# Patient Record
Sex: MALE | Race: WHITE | NOT HISPANIC OR LATINO | Employment: FULL TIME | URBAN - METROPOLITAN AREA
[De-identification: names, ages, dates, MRNs, and addresses within clinical notes are randomized per-mention and may not be internally consistent; named-entity substitution may affect disease eponyms.]

---

## 2017-01-11 ENCOUNTER — OFFICE VISIT (OUTPATIENT)
Dept: FAMILY MEDICINE | Facility: CLINIC | Age: 57
End: 2017-01-11
Payer: COMMERCIAL

## 2017-01-11 VITALS
OXYGEN SATURATION: 97 % | HEIGHT: 75 IN | HEART RATE: 93 BPM | DIASTOLIC BLOOD PRESSURE: 72 MMHG | RESPIRATION RATE: 14 BRPM | TEMPERATURE: 97.9 F | SYSTOLIC BLOOD PRESSURE: 110 MMHG | BODY MASS INDEX: 24.28 KG/M2 | WEIGHT: 195.3 LBS

## 2017-01-11 DIAGNOSIS — M25.571 ACUTE RIGHT ANKLE PAIN: Primary | ICD-10-CM

## 2017-01-11 PROCEDURE — 99213 OFFICE O/P EST LOW 20 MIN: CPT | Performed by: FAMILY MEDICINE

## 2017-01-11 NOTE — MR AVS SNAPSHOT
After Visit Summary   1/11/2017    Gabe Moon    MRN: [id number]           Patient Information     Date Of Birth          1960        Visit Information        Provider Department      1/11/2017 8:15 AM Beatriz Edmondson MD [facility]        Today's Diagnoses     Acute right ankle pain    -  1        Follow-ups after your visit        Additional Services     [facility] PT, HAND, AND CHIROPRACTIC REFERRAL       **This order will print in the [facility] Scheduling Office**    Physical Therapy, Hand Therapy and Chiropractic Care are available through:    *[facility]  *[facility]  *[facility]    Call one number to schedule at any of the above locations: [phone].    Your provider has referred you to: Physical Therapy at [facility] or [facility]    Indication/Reason for Referral: Ankle Pain  Onset of Illness: couple months  Therapy Orders: Evaluate and Treat  Special Programs: None  Special Request: None    Mony Sykes      Additional Comments for the Therapist or Chiropractor:       Please be aware that coverage of these services is subject to the terms and limitations of your health insurance plan.  Call member services at your health plan with any benefit or coverage questions.      Please bring the following to your appointment:    *Your personal calendar for scheduling future appointments  *Comfortable clothing                  Who to contact     If you have questions or need follow up information about today's clinic visit or your schedule please contact [facility] directly at [phone].  Normal or non-critical lab and imaging results will be communicated to you by MyChart, letter or phone within 4 business days after the clinic has received the results. If you do not hear from us within 7 days, please contact the clinic through MyChart or phone. If you have a critical or abnormal lab result, we will notify you by phone as  "soon as possible.  Submit refill requests through [employer] or call your pharmacy and they will forward the refill request to us. Please allow 3 business days for your refill to be completed.          Additional Information About Your Visit        Liazon[employer] Information     [employer] gives you secure access to your electronic health record. If you see a primary care provider, you can also send messages to your care team and make appointments. If you have questions, please call your primary care clinic.  If you do not have a primary care provider, please call [phone] and they will assist you.        Care EveryWhere ID     This is your Care EveryWhere ID. This could be used by other organizations to access your [place] medical records  [id number]        Your Vitals Were     Pulse Temperature Respirations Height BMI (Body Mass Index) Pulse Oximetry    93 97.9  F (36.6  C) (Oral) 14 6' 2.5\" (1.892 m) 24.75 kg/m2 97%       Blood Pressure from Last 3 Encounters:   01/11/17 110/72   09/14/16 110/70   07/12/16 118/73    Weight from Last 3 Encounters:   01/11/17 195 lb 4.8 oz (88.587 kg)   09/14/16 191 lb 1.6 oz (86.682 kg)   07/12/16 193 lb 9.6 oz (87.816 kg)              We Performed the Following     [facility] PT, HAND, AND CHIROPRACTIC REFERRAL        Primary Care Provider    None Specified       No primary provider on file.        Thank you!     Thank you for choosing [facility]  for your care. Our goal is always to provide you with excellent care. Hearing back from our patients is one way we can continue to improve our services. Please take a few minutes to complete the written survey that you may receive in the mail after your visit with us. Thank you!             Your Updated Medication List - Protect others around you: Learn how to safely use, store and throw away your medicines at www.disposemymeds.org.      Notice  As of 1/11/2017  8:39 AM    You have not been prescribed any medications.      "

## 2017-01-11 NOTE — PROGRESS NOTES
"  SUBJECTIVE:                                                    Gabe Moon is a 56 year old male who presents to clinic today for the following health issues:    Ankle flare up      Duration: Since 11/2016 worsen during the holidays.    Description (location/character/radiation): Lt Ankle     Intensity:  Moderate but moments when it becomes severe    Accompanying signs and symptoms: Inflamed,sore and swelling     History (similar episodes/previous evaluation): None    Precipitating or alleviating factors: None    Therapies tried and outcome: Aleve-with some relief      L ankle pain- early Nov, mild then, only if stretching/moving.  Kept getting worse, area started getting swollen over [place].  Aleve helped.  Skiing- hurt if jammed lateral area, fine otherwise.  Also forward flexion on elliptical worsens pain, so stopped.  Swelling on/off.    No trauma, no increased activities.  Can't think of anything that would have started it.    Problem list, Medication list, Allergies, and Medical/Social/Surgical histories reviewed in EPIC and updated as appropriate.    ROS:  Constitutional, HEENT, cardiovascular, pulmonary, gi and gu systems are negative, except as otherwise noted.    OBJECTIVE:                                                    /72 mmHg  Pulse 93  Temp(Src) 97.9  F (36.6  C) (Oral)  Resp 14  Ht 6' 2.5\" (1.892 m)  Wt 195 lb 4.8 oz (88.587 kg)  BMI 24.75 kg/m2  SpO2 97%  Body mass index is 24.75 kg/(m^2).  GENERAL APPEARANCE: healthy, alert and no distress  ORTHO: Ankle Exam:   Knee:normal on palpation, full range of motion  Lower leg:normal appearance, normal on palpation, normal gastroc-soleus muscle complex    ANKLE  Inspection:Swelling:lateral    Tender:lateral malleolus, peroneus longus tendon area  Range of Motion:dorsiflexion:  full, pain-free, plantarflexion:  full, pain-free, inversion:  full, painful, eversion:  full, pain-free  Strength:dorsiflexion:  5/5, plantarflexion:  5/5, " inversion: 5/5, painful, eversion:5/5  Special tests:negative anterior drawer, negative talar tilt, negative valgus stress    FOOT  foot exam : Inspection Palpation:   Swelling: no swelling  Tender::peroneal tendon:  at lateral malleolus  Range of Motion:flexion of toes:  full, pain-free, extension of toes  full, pain-free    SKIN: no suspicious lesions or rashes, varicous veins    Diagnostic Test Results:  none      ASSESSMENT/PLAN:                                                        ICD-10-CM    1. Acute right ankle pain M25.571 [facility] PT, HAND, AND CHIROPRACTIC REFERRAL     Lateral R ankle pain, mostly in peroneus longus tendon area and lateral malleolus, no known trauma or overuse issue, now worsening with time, and especially with activities using plantar flexion.  Rec icing, stretching, and eval/txt with PT given worsening sx's.    Beatriz Edmondson MD  [facility]

## 2017-01-16 ENCOUNTER — THERAPY VISIT (OUTPATIENT)
Dept: PHYSICAL THERAPY | Facility: CLINIC | Age: 57
End: 2017-01-16
Payer: COMMERCIAL

## 2017-01-16 DIAGNOSIS — M25.572 ANKLE PAIN, LEFT: Primary | ICD-10-CM

## 2017-01-16 PROCEDURE — 97161 PT EVAL LOW COMPLEX 20 MIN: CPT | Mod: GP | Performed by: PHYSICAL THERAPIST

## 2017-01-16 PROCEDURE — 97140 MANUAL THERAPY 1/> REGIONS: CPT | Mod: GP | Performed by: PHYSICAL THERAPIST

## 2017-01-16 PROCEDURE — 97110 THERAPEUTIC EXERCISES: CPT | Mod: GP | Performed by: PHYSICAL THERAPIST

## 2017-01-16 NOTE — PROGRESS NOTES
Subjective:    Gabe Moon is a 56 year old male with a left ankle condition.  Condition occurred with:  Insidious onset.  Condition occurred: for unknown reasons.  This is a new condition  1/11/17 referral.  Pt stated his pain began in early November - he does not recall a precipitating event.  Pain follows peroneal tendon/muscle posterior to L lateral malleolus.  Ankle pain has been present since the onset of pain, foot pain has just started yesterday.  Pt reports walking, standing, calf raises are pain free, but calf raises flare it up the next day.  He also reports R>L hip issues and hx of back pain for which he has had physical therapy.   Goes to gym daily - can't do elliptical due to dorsiflexion, skiing - cautious but able to perform, walking on uneven ground/balancing have been challenging.  .    Patient reports pain:  Lateral.  Radiates to:  Lower leg (peroneal tendons/muscles, Achilles tendon occasionally).  Pain is described as aching and sharp and is intermittent and reported as 5/10.  Associated symptoms:  Edema and loss of strength. Pain is worse in the P.M..  Symptoms are exacerbated by other and weight bearing (inversion + weight bearing, dorsiflexion, elliptical) and relieved by bracing/immobilizing and NSAID's.  Since onset symptoms are gradually worsening.        General health as reported by patient is excellent.  Pertinent medical history includes:  Other (L 4/5 DDD).  Medical allergies: no.  Other surgeries include:  Other (L LE varicose veins 2-3 years ago).  Current medications:  Anti-inflammatory.  Current occupation is .  Patient is working in normal job without restrictions.  Primary job tasks include:  Prolonged sitting and prolonged standing (computer work).    Barriers include:  None as reported by patient.    Red flags:  None as reported by patient.                      Objective:    Standing Alignment:          Pelvic:  Normal  Hip deviations alignment: B femoral  medial rotation.  Knee:  Genu recuvatum L and genu recurvatum R  Ankle/Foot:  Pes cavus R and pes cavus L (R>L calcaneal varus)  General Deviations:  Toe out L and toe out R            Physical Exam     Functional Tests:  L single leg stance: femoral medial rotation, soreness reported, goes into pronation  R single leg stance: slight supination initially, then stable  Partial squat: good femoral control, no change in sxs  Active dorsiflexion: excessed toe extension B, gets pain at L lateral ankle with repetition    Strength:  R ankle 5/5 and pain free  L ankle 5/5, pain reported at the following:  Fib brev/long - slight pain lateral L ankle  Post tib - soreness in L lateral ankle  No pain fib tertius    ROM:  Motion L R Comments   Plantarflexion 11 19 Pain free   Dorsiflexion   14 22 Pain free     Sensation:  Light touch intact except lateral L lower leg - impaired since varicose vein surgery    Palpation:  Tenderness L fibularis longus/brevis tendon/muscle belly, no tenderness L Achilles tendon    Edema:  L lateral ankle mild edema compared to R    Gait:  R>L trendelenberg, clockwise pelvic rotation, R>L femoral adduction, B toe out sliglhty, B toe ext        General     ROS    Assessment/Plan:      Patient is a 56 year old male with left side ankle complaints.    Patient has the following significant findings with corresponding treatment plan.                Diagnosis 1:  L ankle pain  Pain -  hot/cold therapy, manual therapy, education and home program  Decreased ROM/flexibility - manual therapy, therapeutic exercise, therapeutic activity and home program  Impaired balance - neuro re-education, therapeutic activities and home program  Edema - cold therapy and self management/home program  Impaired gait - gait training and home program  Impaired muscle performance - neuro re-education and home program  Decreased function - therapeutic activities and home program  Impaired posture - neuro re-education, therapeutic  activities and home program    Therapy Evaluation Codes:   1) History comprised of:   Personal factors that impact the plan of care:      Profession.    Comorbidity factors that impact the plan of care are:      None.     Medications impacting care: Anti-inflammatory.  2) Examination of Body Systems comprised of:   Body structures and functions that impact the plan of care:      Ankle and foot.   Activity limitations that impact the plan of care are:      Sports, Standing and Walking.  3) Clinical presentation characteristics are:   Stable/Uncomplicated.  4) Decision-Making    Low complexity using standardized patient assessment instrument and/or measureable assessment of functional outcome.  Cumulative Therapy Evaluation is: Low complexity.    Previous and current functional limitations:  (See Goal Flow Sheet for this information)    Short term and Long term goals: (See Goal Flow Sheet for this information)     Communication ability:  Patient appears to be able to clearly communicate and understand verbal and written communication and follow directions correctly.  Treatment Explanation - The following has been discussed with the patient:   RX ordered/plan of care  Anticipated outcomes  Possible risks and side effects  This patient would benefit from PT intervention to resume normal activities.   Rehab potential is good.    Frequency:  1 X week, once daily  Duration:  for 6 weeks  Discharge Plan:  Achieve all LTG.  Independent in home treatment program.  Reach maximal therapeutic benefit.    Please refer to the daily flowsheet for treatment today, total treatment time and time spent performing 1:1 timed codes.

## 2017-01-23 ENCOUNTER — THERAPY VISIT (OUTPATIENT)
Dept: PHYSICAL THERAPY | Facility: CLINIC | Age: 57
End: 2017-01-23
Payer: COMMERCIAL

## 2017-01-23 DIAGNOSIS — M25.572 ANKLE PAIN, LEFT: Primary | ICD-10-CM

## 2017-01-23 PROCEDURE — 97140 MANUAL THERAPY 1/> REGIONS: CPT | Mod: GP | Performed by: PHYSICAL THERAPIST

## 2017-01-23 PROCEDURE — 97112 NEUROMUSCULAR REEDUCATION: CPT | Mod: GP | Performed by: PHYSICAL THERAPIST

## 2017-01-23 PROCEDURE — 97110 THERAPEUTIC EXERCISES: CPT | Mod: GP | Performed by: PHYSICAL THERAPIST

## 2017-01-30 ENCOUNTER — THERAPY VISIT (OUTPATIENT)
Dept: PHYSICAL THERAPY | Facility: CLINIC | Age: 57
End: 2017-01-30
Payer: COMMERCIAL

## 2017-01-30 DIAGNOSIS — M25.572 ANKLE PAIN, LEFT: Primary | ICD-10-CM

## 2017-01-30 PROCEDURE — 97112 NEUROMUSCULAR REEDUCATION: CPT | Mod: GP | Performed by: PHYSICAL THERAPIST

## 2017-01-30 PROCEDURE — 97140 MANUAL THERAPY 1/> REGIONS: CPT | Mod: GP | Performed by: PHYSICAL THERAPIST

## 2017-01-30 PROCEDURE — 97110 THERAPEUTIC EXERCISES: CPT | Mod: GP | Performed by: PHYSICAL THERAPIST

## 2017-02-10 ENCOUNTER — TELEPHONE (OUTPATIENT)
Dept: FAMILY MEDICINE | Facility: CLINIC | Age: 57
End: 2017-02-10

## 2017-02-10 ENCOUNTER — THERAPY VISIT (OUTPATIENT)
Dept: PHYSICAL THERAPY | Facility: CLINIC | Age: 57
End: 2017-02-10
Payer: COMMERCIAL

## 2017-02-10 DIAGNOSIS — M25.519 ACUTE SHOULDER PAIN, UNSPECIFIED LATERALITY: Primary | ICD-10-CM

## 2017-02-10 DIAGNOSIS — M25.572 ANKLE PAIN, LEFT: Primary | ICD-10-CM

## 2017-02-10 PROCEDURE — 97110 THERAPEUTIC EXERCISES: CPT | Mod: GP | Performed by: PHYSICAL THERAPIST

## 2017-02-10 PROCEDURE — 97140 MANUAL THERAPY 1/> REGIONS: CPT | Mod: GP | Performed by: PHYSICAL THERAPIST

## 2017-02-10 PROCEDURE — 97112 NEUROMUSCULAR REEDUCATION: CPT | Mod: GP | Performed by: PHYSICAL THERAPIST

## 2017-02-10 NOTE — TELEPHONE ENCOUNTER
Reason for Call: Request for an order or referral:    Order or referral being requested: PT for shoulder right     Date needed: as soon as possible    Has the patient been seen by the PCP for this problem? YES    Additional comments: is a chronic problem and has flared up.   Is an old injury    Phone number Patient can be reached at:  Home number on file [phone] (home)    Best Time:  anytime    Can we leave a detailed message on this number?  YES    Call taken on 2/10/2017 at 4:49 PM by Elsy Ty

## 2017-02-13 NOTE — TELEPHONE ENCOUNTER
MP,  Please advise CW's absence.  Pt has PT appt scheduled for 840am today.  No referral in place yet.  Please authorize if appropriate.  Thanks,  Marly COONEY RN

## 2017-02-13 NOTE — TELEPHONE ENCOUNTER
Here Orders are done they are in her chart. Here insurance doesn't require referrals    Marychuy Rodríguez  Referral Coordinator

## 2017-02-13 NOTE — TELEPHONE ENCOUNTER
Patient informed; states he was told referral was needed in order for them to see him  Marly COONEY RN

## 2017-02-13 NOTE — TELEPHONE ENCOUNTER
Reason for call: Order   Order or referral being requested: PT for shoulder injury/issue.   Reason for request: Pt is waiting on approval for a PT orders, states he has an appt today.   Date needed: as soon as possible per pt  Has the patient been seen by the PCP for this problem? unsure    Additional comments: n/a    Phone Number Pt can be reached at: [place] number on file [phone] (home)  Best Time: As soon as possible  Can we leave a detailed message on this number? YES

## 2017-02-15 ENCOUNTER — THERAPY VISIT (OUTPATIENT)
Dept: PHYSICAL THERAPY | Facility: CLINIC | Age: 57
End: 2017-02-15
Payer: COMMERCIAL

## 2017-02-15 DIAGNOSIS — G89.29 CHRONIC RIGHT SHOULDER PAIN: Primary | ICD-10-CM

## 2017-02-15 DIAGNOSIS — M25.511 CHRONIC RIGHT SHOULDER PAIN: Primary | ICD-10-CM

## 2017-02-15 PROCEDURE — 97112 NEUROMUSCULAR REEDUCATION: CPT | Mod: GP | Performed by: PHYSICAL THERAPIST

## 2017-02-15 PROCEDURE — 97110 THERAPEUTIC EXERCISES: CPT | Mod: GP | Performed by: PHYSICAL THERAPIST

## 2017-02-15 PROCEDURE — 97161 PT EVAL LOW COMPLEX 20 MIN: CPT | Mod: GP | Performed by: PHYSICAL THERAPIST

## 2017-02-15 NOTE — MR AVS SNAPSHOT
After Visit Summary   2/15/2017    Gabe Moon    MRN: [id number]           Patient Information     Date Of Birth          1960        Visit Information        Provider Department      2/15/2017 5:10 PM Barbara Pt, VESTA [place] [facility]        Today's Diagnoses     Chronic right shoulder pain    -  1       Follow-ups after your visit        Who to contact     If you have questions or need follow up information about today's clinic visit or your schedule please contact [facility] PHYSICAL THERAPY directly at [phone].  Normal or non-critical lab and imaging results will be communicated to you by [employer], letter or phone within 4 business days after the clinic has received the results. If you do not hear from us within 7 days, please contact the clinic through [employer] or phone. If you have a critical or abnormal lab result, we will notify you by phone as soon as possible.  Submit refill requests through [employer] or call your pharmacy and they will forward the refill request to us. Please allow 3 business days for your refill to be completed.          Additional Information About Your Visit        MyChart Information     [employer] gives you secure access to your electronic health record. If you see a primary care provider, you can also send messages to your care team and make appointments. If you have questions, please call your primary care clinic.  If you do not have a primary care provider, please call [phone] and they will assist you.        Care EveryWhere ID     This is your Care EveryWhere ID. This could be used by other organizations to access your [place] medical records  [id number]         Blood Pressure from Last 3 Encounters:   01/11/17 110/72   09/14/16 110/70   07/12/16 118/73    Weight from Last 3 Encounters:   01/11/17 88.6 kg (195 lb 4.8 oz)   09/14/16 86.7 kg (191 lb 1.6 oz)   07/12/16 87.8 kg  (193 lb 9.6 oz)              We Performed the Following     HC PT EVAL, LOW COMPLEXITY     [facility] INITIAL EVAL REPORT     NEUROMUSCULAR RE-EDUCATION     THERAPEUTIC EXERCISES        Primary Care Provider    None Specified       No primary provider on file.        Thank you!     Thank you for choosing [place] [facility] PHYSICAL THERAP[facility]  for your care. Our goal is always to provide you with excellent care. Hearing back from our patients is one way we can continue to improve our services. Please take a few minutes to complete the written survey that you may receive in the mail after your visit with us. Thank you!             Your Updated Medication List - Protect others around you: Learn how to safely use, store and throw away your medicines at www.disposemymeds.org.      Notice  As of 2/15/2017  5:56 PM    You have not been prescribed any medications.

## 2017-02-15 NOTE — PROGRESS NOTES
Subjective:    Gabe Moon is a 56 year old male with a right shoulder condition.    Condition occurred: for unknown reasons.  This is a chronic condition  10 years ago did fall skiing [facility] DENNIS MD order for chronic R shoulder pain 2/13/17. Pain is preventing reaching and lifting. He no longer can do push ups.    Patient reports pain:  Anterior.  Radiates to:  Upper arm.  Pain is described as aching and sharp  and reported as 8/10.  Associated symptoms:  Loss of strength. Pain is worse during the day.  Symptoms are exacerbated by using arm overhead, certain positions, lying on extremity, using arm behind back, using arm at shoulder level and lifting and relieved by rest.  Since onset symptoms are gradually worsening.        General health as reported by patient is excellent.  Pertinent medical history includes:  None.  Medical allergies: no.  Other surgeries include:  None reported.      Patient is working in normal job without restrictions.      Barriers include:  None as reported by the patient.    Red flags:  None as reported by the patient.                      Objective:    Standing Alignment:      Shoulder/UE:  Scapular winging R and scapular winging L                                       Shoulder Evaluation:  ROM:  AROM:  normal                            PROM:  normal                                Strength:  : 4/5 scaption, ER and terres minor R, Empty Can 4-/5 most pain.                      Stability Testing:  normal      Special Tests:  Special tests assessed shoulder: + Neer's and Leo Hawkinns R.    Right shoulder positive for the following special tests:Impingement and Acrimioclavicular  Palpation:      Right shoulder tenderness present at: Acrimioclavicular; Supraspinatus; Infraspinatus and Teres Minor  Mobility Tests:    Glenohumeral anterior right:  Hypermobile  Glenohumeral posterior right:  Hypomobile                                             General     ROS    Assessment/Plan:       Patient is a 56 year old male with right side shoulder complaints.    Patient has the following significant findings with corresponding treatment plan.                Diagnosis 1:  R shoulder  Pain -  self management, education and home program  Decreased strength - therapeutic exercise and therapeutic activities  Impaired muscle performance - neuro re-education  Decreased function - therapeutic activities    Therapy Evaluation Codes:   1) History comprised of:   Personal factors that impact the plan of care:      None.    Comorbidity factors that impact the plan of care are:      Weakness.     Medications impacting care: None.  2) Examination of Body Systems comprised of:   Body structures and functions that impact the plan of care:      Shoulder.   Activity limitations that impact the plan of care are:      Dressing, Lifting, Sports, Throwing and reaching.  3) Clinical presentation characteristics are:   Evolving/Changing.  4) Decision-Making    Low complexity using standardized patient assessment instrument and/or measureable assessment of functional outcome.  Cumulative Therapy Evaluation is: Low complexity.    Previous and current functional limitations:  (See Goal Flow Sheet for this information)    Short term and Long term goals: (See Goal Flow Sheet for this information)     Communication ability:  Patient appears to be able to clearly communicate and understand verbal and written communication and follow directions correctly.  Treatment Explanation - The following has been discussed with the patient:   RX ordered/plan of care  Anticipated outcomes  Possible risks and side effects  This patient would benefit from PT intervention to resume normal activities.   Rehab potential is excellent.    Frequency:  2 X a month, once daily  Duration:  for 3 months  Discharge Plan:  Achieve all LTG.  Independent in home treatment program.  Reach maximal therapeutic benefit.    Please refer to the daily flowsheet for  treatment today, total treatment time and time spent performing 1:1 timed codes.

## 2017-02-17 ENCOUNTER — THERAPY VISIT (OUTPATIENT)
Dept: PHYSICAL THERAPY | Facility: CLINIC | Age: 57
End: 2017-02-17
Payer: COMMERCIAL

## 2017-02-17 DIAGNOSIS — M25.572 ACUTE LEFT ANKLE PAIN: ICD-10-CM

## 2017-02-17 PROCEDURE — 97112 NEUROMUSCULAR REEDUCATION: CPT | Mod: GP | Performed by: PHYSICAL THERAPIST

## 2017-02-17 PROCEDURE — 97110 THERAPEUTIC EXERCISES: CPT | Mod: GP | Performed by: PHYSICAL THERAPIST

## 2017-05-17 ENCOUNTER — THERAPY VISIT (OUTPATIENT)
Dept: PHYSICAL THERAPY | Facility: CLINIC | Age: 57
End: 2017-05-17
Payer: COMMERCIAL

## 2017-05-17 DIAGNOSIS — G89.29 CHRONIC RIGHT SHOULDER PAIN: ICD-10-CM

## 2017-05-17 DIAGNOSIS — M25.511 CHRONIC RIGHT SHOULDER PAIN: ICD-10-CM

## 2017-05-17 PROCEDURE — 97112 NEUROMUSCULAR REEDUCATION: CPT | Mod: GP | Performed by: PHYSICAL THERAPIST

## 2017-05-17 PROCEDURE — 97110 THERAPEUTIC EXERCISES: CPT | Mod: GP | Performed by: PHYSICAL THERAPIST

## 2017-05-17 NOTE — MR AVS SNAPSHOT
After Visit Summary   5/17/2017    Gabe Moon    MRN: [id number]           Patient Information     Date Of Birth          1960        Visit Information        Provider Department      5/17/2017 12:40 PM Barbara Pt, VESTA [place] [facility]        Today's Diagnoses     Chronic right shoulder pain           Follow-ups after your visit        Who to contact     If you have questions or need follow up information about today's clinic visit or your schedule please contact [facility] PHYSICAL THERAPY directly at [phone].  Normal or non-critical lab and imaging results will be communicated to you by XMLAWhart, letter or phone within 4 business days after the clinic has received the results. If you do not hear from us within 7 days, please contact the clinic through [employer] or phone. If you have a critical or abnormal lab result, we will notify you by phone as soon as possible.  Submit refill requests through Macaw or call your pharmacy and they will forward the refill request to us. Please allow 3 business days for your refill to be completed.          Additional Information About Your Visit        MyChart Information     Macaw gives you secure access to your electronic health record. If you see a primary care provider, you can also send messages to your care team and make appointments. If you have questions, please call your primary care clinic.  If you do not have a primary care provider, please call [phone] and they will assist you.        Care EveryWhere ID     This is your Care EveryWhere ID. This could be used by other organizations to access your [place] medical records  [id number]         Blood Pressure from Last 3 Encounters:   01/11/17 110/72   09/14/16 110/70   07/12/16 118/73    Weight from Last 3 Encounters:   01/11/17 88.6 kg (195 lb 4.8 oz)   09/14/16 86.7 kg (191 lb 1.6 oz)   07/12/16 87.8 kg  (193 lb 9.6 oz)              We Performed the Following     NEUROMUSCULAR RE-EDUCATION     THERAPEUTIC EXERCISES        Primary Care Provider    None Specified       No primary provider on file.        Thank you!     Thank you for choosing [place] [facility] PHYSICAL THERAP[facility]  for your care. Our goal is always to provide you with excellent care. Hearing back from our patients is one way we can continue to improve our services. Please take a few minutes to complete the written survey that you may receive in the mail after your visit with us. Thank you!             Your Updated Medication List - Protect others around you: Learn how to safely use, store and throw away your medicines at www.disposemymeds.org.      Notice  As of 5/17/2017  1:26 PM    You have not been prescribed any medications.

## 2017-06-28 PROBLEM — G89.29 CHRONIC RIGHT SHOULDER PAIN: Status: RESOLVED | Noted: 2017-02-15 | Resolved: 2017-06-28

## 2017-06-28 PROBLEM — M25.511 CHRONIC RIGHT SHOULDER PAIN: Status: RESOLVED | Noted: 2017-02-15 | Resolved: 2017-06-28

## 2017-06-28 NOTE — PROGRESS NOTES
Subjective:    HPI                    Objective:    System    Physical Exam    General     ROS    Assessment/Plan:      DISCHARGE REPORT    Progress reporting period is from 2/17/17 to 5/17/17.     SUBJECTIVE  Subjective: Better, but did not follow through for awhile and has now been committed to HEP and has made improvements   Current Pain level: 3/10   Initial Pain level: 8/10   Changes in function: Yes, see goal flow sheet for change in function   Adverse reactions: Activity:;   , Adverse reaction activity: bench press push ups   Patient has failed to return to therapy so current objective findings are unknown.  The subjective and objective information are from the last SOAP note on this patient.    OBJECTIVE  Objective: +Speed's, Neer's and SLABIR R. Coracoid impingement + IR, ER and Terres 5/5, Abd scaption and empty can 5-/5      ASSESSMENT/PLAN  Updated problem list and treatment plan: Diagnosis 1:  Shoulder pain  Pain -  self management, education and home program  Decreased strength - therapeutic exercise and therapeutic activities  Impaired muscle performance - neuro re-education  Decreased function - therapeutic activities  STG/LTGs have been met or progress has been made towards goals:  Yes (See Goal flow sheet completed today.)  Assessment of Progress: The patient's condition has potential to improve.  Patient has not returned to therapy.  Current status is unknown and discharge G code cannot be reported.  Self Management Plans:  Patient is independent in a home treatment program.    Gabe continues to require the following intervention to meet STG and LTG's: PT intervention is no longer required to meet STG/LTG.  The patient failed to complete scheduled/ordered appointments so current information is unknown.    Recommendations:  This patient is ready to be discharged from therapy and continue their home treatment program.    Please refer to the daily flowsheet for treatment today, total treatment time  and time spent performing 1:1 timed codes.

## 2017-09-15 ENCOUNTER — OFFICE VISIT (OUTPATIENT)
Dept: FAMILY MEDICINE | Facility: CLINIC | Age: 57
End: 2017-09-15
Payer: COMMERCIAL

## 2017-09-15 VITALS
BODY MASS INDEX: 23.95 KG/M2 | WEIGHT: 192.6 LBS | HEART RATE: 85 BPM | OXYGEN SATURATION: 100 % | DIASTOLIC BLOOD PRESSURE: 72 MMHG | SYSTOLIC BLOOD PRESSURE: 126 MMHG | HEIGHT: 75 IN | TEMPERATURE: 98.1 F | RESPIRATION RATE: 16 BRPM

## 2017-09-15 DIAGNOSIS — Z23 NEED FOR TDAP VACCINATION: ICD-10-CM

## 2017-09-15 DIAGNOSIS — Z00.00 ENCOUNTER FOR ROUTINE ADULT HEALTH EXAMINATION WITHOUT ABNORMAL FINDINGS: Primary | ICD-10-CM

## 2017-09-15 DIAGNOSIS — Z11.3 SCREEN FOR STD (SEXUALLY TRANSMITTED DISEASE): ICD-10-CM

## 2017-09-15 DIAGNOSIS — Z23 NEED FOR VACCINATION: ICD-10-CM

## 2017-09-15 PROCEDURE — 90472 IMMUNIZATION ADMIN EACH ADD: CPT | Performed by: FAMILY MEDICINE

## 2017-09-15 PROCEDURE — 90471 IMMUNIZATION ADMIN: CPT | Performed by: FAMILY MEDICINE

## 2017-09-15 PROCEDURE — 87591 N.GONORRHOEAE DNA AMP PROB: CPT | Performed by: FAMILY MEDICINE

## 2017-09-15 PROCEDURE — 90715 TDAP VACCINE 7 YRS/> IM: CPT | Performed by: FAMILY MEDICINE

## 2017-09-15 PROCEDURE — G0103 PSA SCREENING: HCPCS | Performed by: FAMILY MEDICINE

## 2017-09-15 PROCEDURE — 87491 CHLMYD TRACH DNA AMP PROBE: CPT | Performed by: FAMILY MEDICINE

## 2017-09-15 PROCEDURE — 90686 IIV4 VACC NO PRSV 0.5 ML IM: CPT | Performed by: FAMILY MEDICINE

## 2017-09-15 PROCEDURE — 87389 HIV-1 AG W/HIV-1&-2 AB AG IA: CPT | Performed by: FAMILY MEDICINE

## 2017-09-15 PROCEDURE — 99396 PREV VISIT EST AGE 40-64: CPT | Mod: 25 | Performed by: FAMILY MEDICINE

## 2017-09-15 PROCEDURE — 36415 COLL VENOUS BLD VENIPUNCTURE: CPT | Performed by: FAMILY MEDICINE

## 2017-09-15 PROCEDURE — 90746 HEPB VACCINE 3 DOSE ADULT IM: CPT | Performed by: FAMILY MEDICINE

## 2017-09-15 PROCEDURE — 86780 TREPONEMA PALLIDUM: CPT | Performed by: FAMILY MEDICINE

## 2017-09-15 PROCEDURE — 90632 HEPA VACCINE ADULT IM: CPT | Performed by: FAMILY MEDICINE

## 2017-09-15 RX ORDER — OMEGA-3 FATTY ACIDS/FISH OIL 300-1000MG
1 CAPSULE ORAL DAILY
Qty: 90 CAPSULE | Status: ON HOLD | COMMUNITY
Start: 2017-09-15 | End: 2021-04-01

## 2017-09-15 RX ORDER — MULTIPLE VITAMINS W/ MINERALS TAB 9MG-400MCG
1 TAB ORAL DAILY
Qty: 100 TABLET | Refills: 0 | COMMUNITY
Start: 2017-09-15

## 2017-09-15 NOTE — MR AVS SNAPSHOT
After Visit Summary   9/15/2017    Gabe Moon    MRN: [id number]           Patient Information     Date Of Birth          1960        Visit Information        Provider Department      9/15/2017 8:00 AM Beatriz Edmondson MD [facility]        Today's Diagnoses     Encounter for routine adult health examination without abnormal findings    -  1    Screen for STD (sexually transmitted disease)        Need for Tdap vaccination          Care Instructions      Preventive Health Recommendations  Male Ages 50 - 64    Yearly exam:             See your health care provider every year in order to  o   Review health changes.   o   Discuss preventive care.    o   Review your medicines if your doctor has prescribed any.     Have a cholesterol test every 5 years, or more frequently if you are at risk for high cholesterol/heart disease.     Have a diabetes test (fasting glucose) every three years. If you are at risk for diabetes, you should have this test more often.     Have a colonoscopy at age 50, or have a yearly FIT test (stool test). These exams will check for colon cancer.      Talk with your health care provider about whether or not a prostate cancer screening test (PSA) is right for you.    You should be tested each year for STDs (sexually transmitted diseases), if you re at risk.     Shots: Get a flu shot each year. Get a tetanus shot every 10 years.     Nutrition:    Eat at least 5 servings of fruits and vegetables daily.     Eat whole-grain bread, whole-wheat pasta and brown rice instead of white grains and rice.     Talk to your provider about Calcium and Vitamin D.     Lifestyle    Exercise for at least 150 minutes a week (30 minutes a day, 5 days a week). This will help you control your weight and prevent disease.     Limit alcohol to one drink per day.     No smoking.     Wear sunscreen to prevent skin cancer.     See your dentist every six months for an exam and cleaning.  "    See your eye doctor every 1 to 2 years.            Follow-ups after your visit        Who to contact     If you have questions or need follow up information about today's clinic visit or your schedule please contact [facility] directly at [phone].  Normal or non-critical lab and imaging results will be communicated to you by MyChart, letter or phone within 4 business days after the clinic has received the results. If you do not hear from us within 7 days, please contact the clinic through Ravel Lawhart or phone. If you have a critical or abnormal lab result, we will notify you by phone as soon as possible.  Submit refill requests through [employer] or call your pharmacy and they will forward the refill request to us. Please allow 3 business days for your refill to be completed.          Additional Information About Your Visit        MyChart Information     [employer] gives you secure access to your electronic health record. If you see a primary care provider, you can also send messages to your care team and make appointments. If you have questions, please call your primary care clinic.  If you do not have a primary care provider, please call [phone] and they will assist you.        Care EveryWhere ID     This is your Care EveryWhere ID. This could be used by other organizations to access your [place] medical records  [id number]        Your Vitals Were     Pulse Temperature Respirations Height Pulse Oximetry BMI (Body Mass Index)    85 98.1  F (36.7  C) (Oral) 16 6' 2.5\" (1.892 m) 100% 24.4 kg/m2       Blood Pressure from Last 3 Encounters:   09/15/17 126/72   01/11/17 110/72   09/14/16 110/70    Weight from Last 3 Encounters:   09/15/17 192 lb 9.6 oz (87.4 kg)   01/11/17 195 lb 4.8 oz (88.6 kg)   09/14/16 191 lb 1.6 oz (86.7 kg)              We Performed the Following     Anti Treponema     CHLAMYDIA TRACHOMATIS PCR     HIV Antigen Antibody Combo     NEISSERIA GONORRHOEA PCR     PSA, screen     TDAP " VACCINE (ADACEL)        Primary Care Provider Office Phone # Fax #    Beatriz Edmondson [phone] [address] [address]        Equal Access to Services     BETO WILSON : Hadii oscar ku hadloboo Soomaali, waaxda luqadaha, qaybta kaalmada adeegyada, waxjalen idiin yosvanyn aderay kenyon laAutumntobias clayton. So [facility] [phone].    ATENCIÓN: Si habla español, tiene a arias disposición servicios gratuitos de asistencia lingüística. Llame al [phone].    We comply with applicable federal civil rights laws and [place] laws. We do not discriminate on the basis of race, color, national origin, age, disability sex, sexual orientation or gender identity.            Thank you!     Thank you for choosing [facility]  for your care. Our goal is always to provide you with excellent care. Hearing back from our patients is one way we can continue to improve our services. Please take a few minutes to complete the written survey that you may receive in the mail after your visit with us. Thank you!             Your Updated Medication List - Protect others around you: Learn how to safely use, store and throw away your medicines at www.disposemymeds.org.      Notice  As of 9/15/2017  8:37 AM    You have not been prescribed any medications.

## 2017-09-15 NOTE — NURSING NOTE
"Chief Complaint   Patient presents with     Physical       Initial /72  Pulse 85  Temp 98.1  F (36.7  C) (Oral)  Resp 16  Ht 6' 2.5\" (1.892 m)  Wt 192 lb 9.6 oz (87.4 kg)  SpO2 100%  BMI 24.4 kg/m2 Estimated body mass index is 24.4 kg/(m^2) as calculated from the following:    Height as of this encounter: 6' 2.5\" (1.892 m).    Weight as of this encounter: 192 lb 9.6 oz (87.4 kg).  Medication Reconciliation: complete    Health Maintenance Due Pending Provider Review:  NONE    n/a    Shannan Nieves MA  [facility]  "

## 2017-09-15 NOTE — PROGRESS NOTES
SUBJECTIVE:   CC: Gabe Moon is an 57 year old male who presents for preventative health visit.     Physical   Annual:     Getting at least 3 servings of Calcium per day::  Yes    Bi-annual eye exam::  Yes    Dental care twice a year::  Yes    Sleep apnea or symptoms of sleep apnea::  None    Diet::  Other    Frequency of exercise::  4-5 days/week    Duration of exercise::  45-60 minutes    Taking medications regularly::  Yes    Medication side effects::  Not applicable    Additional concerns today::  YES    White spots on his mid cheek- planning on seeing derm.    Urine flow- noticed changes in past year.  Wakes up early morning, trouble initiating flow, comes and goes.  No issues the rest of the day.    No stool issues.    Jts have been better than ever- doing exercises- shoulder and back are better after doing PT.    Diet- cycles on/off whole 30, really helpful.  Started doing it to help lose inch around his waist, really worked.  Taught him good effective whole eating.  Reduced alcohol intake.  Really reduced sugar.      Taking MVI, fish oil, and Vit D.      Today's PHQ-2 Score:   PHQ-2 ( 1999 Pfizer) 9/12/2017   Q1: Little interest or pleasure in doing things 0   Q2: Feeling down, depressed or hopeless 0   PHQ-2 Score 0   Q1: Little interest or pleasure in doing things Not at all   Q2: Feeling down, depressed or hopeless Not at all   PHQ-2 Score 0       Abuse: Current or Past(Physical, Sexual or Emotional)- No  Do you feel safe in your environment - Yes    Social History   Substance Use Topics     Smoking status: Never Smoker     Smokeless tobacco: Never Used     Alcohol use 0.0 oz/week     0 Standard drinks or equivalent per week     The patient does not drink >3 drinks per day nor >7 drinks per week.    Last PSA: No results found for: PSA    Reviewed orders with patient. Reviewed health maintenance and updated orders accordingly - Yes  Labs reviewed in EPIC  BP Readings from Last 3 Encounters:   09/15/17  126/72   01/11/17 110/72   09/14/16 110/70    Wt Readings from Last 3 Encounters:   09/15/17 192 lb 9.6 oz (87.4 kg)   01/11/17 195 lb 4.8 oz (88.6 kg)   09/14/16 191 lb 1.6 oz (86.7 kg)                  Patient Active Problem List   Diagnosis     Ankle pain, left     Past Surgical History:   Procedure Laterality Date     HERNIA REPAIR  2003    inguinal     LIGATE VEIN      '93 scrotum, '14 left leg       Social History   Substance Use Topics     Smoking status: Never Smoker     Smokeless tobacco: Never Used     Alcohol use 0.0 oz/week     0 Standard drinks or equivalent per week     Family History   Problem Relation Age of Onset     Breast Cancer Mother      Alzheimer Disease Mother      Depression Mother      MENTAL ILLNESS Mother      bipolar     Heart Murmur Mother      not murmur, racing heart     DIABETES Father      Hypertension Father      Prostate Cancer Father      Osteoarthritis Father      hip replacement (overwt)     Osteoarthritis Paternal Grandfather      hip replacements         Current Outpatient Prescriptions   Medication Sig Dispense Refill     multivitamin, therapeutic with minerals (MULTI-VITAMIN) TABS tablet Take 1 tablet by mouth daily 100 tablet 0     omega 3 1000 MG CAPS Take 1 g by mouth daily 90 capsule      cholecalciferol (VITAMIN D) 1000 UNIT tablet Take 1 tablet (1,000 Units) by mouth daily 100 tablet 3     No Known Allergies  Recent Labs   Lab Test  09/15/16   0748   LDL  100*   HDL  86   TRIG  57              Reviewed and updated as needed this visit by clinical staffTobacco  Allergies  Meds  Problems  Med Hx  Surg Hx  Fam Hx  Soc Hx          Reviewed and updated as needed this visit by Provider  Allergies  Meds  Problems  Fam Hx              ROS:  10 point ROS of systems including Constitutional, Eyes, Respiratory, Cardiovascular, Gastroenterology, Genitourinary, Integumentary, Muscularskeletal, Psychiatric were all negative except for pertinent positives noted in my  "HPI.      OBJECTIVE:   /72  Pulse 85  Temp 98.1  F (36.7  C) (Oral)  Resp 16  Ht 6' 2.5\" (1.892 m)  Wt 192 lb 9.6 oz (87.4 kg)  SpO2 100%  BMI 24.4 kg/m2    EXAM:  GENERAL: healthy, alert and no distress  EYES: Eyes grossly normal to inspection, PERRL and conjunctivae and sclerae normal  HENT: ear canals and TM's normal, nose and mouth without ulcers or lesions  NECK: no adenopathy, no asymmetry, masses, or scars and thyroid normal to palpation  RESP: lungs clear to auscultation - no rales, rhonchi or wheezes  CV: regular rate and rhythm, normal S1 S2, no S3 or S4, no murmur, click or rub, no peripheral edema and peripheral pulses strong  ABDOMEN: soft, nontender, no hepatosplenomegaly, no masses and bowel sounds normal  MS: no gross musculoskeletal defects noted, no edema  SKIN: no suspicious lesions or rashes  NEURO: Normal strength and tone, mentation intact and speech normal  PSYCH: mentation appears normal, affect normal/bright    ASSESSMENT/PLAN:       ICD-10-CM    1. Need for vaccination Z23 HEPATITIS A VACCINE (ADULT)     HC FLU VAC PRESRV FREE QUAD SPLIT VIR 3+YRS IM     HEPATITIS B VACCINE, ADULT, IM     CANCELED: TDAP VACCINE (ADACEL)   2. Encounter for routine adult health examination without abnormal findings Z00.00 PSA, screen   3. Screen for STD (sexually transmitted disease) Z11.3 NEISSERIA GONORRHOEA PCR     CHLAMYDIA TRACHOMATIS PCR     HIV Antigen Antibody Combo     Anti Treponema   4. Need for Tdap vaccination Z23 TDAP VACCINE (ADACEL)     CPE- Discussed diet, calcium and exercise.  Eyes and Teeth or UTD or recommended f/u.  Tdap, Hep A, Hep B and flu immunizations needed today - will do Tdap and flu, second and final Hep A, and second Hep B- will do last one likely next year.  See orders below for tests ordered and screening needed.  Will also do STD screening.  Cont great work with diet/exercise.        COUNSELING:   Reviewed preventive health counseling, as reflected in patient " "instructions    BP Screening:   Last 3 BP Readings:    BP Readings from Last 3 Encounters:   09/15/17 126/72   01/11/17 110/72   09/14/16 110/70       The following was recommended to the patient:  Re-screen BP within a year and recommended lifestyle modifications       reports that he has never smoked. He has never used smokeless tobacco.      Estimated body mass index is 24.4 kg/(m^2) as calculated from the following:    Height as of this encounter: 6' 2.5\" (1.892 m).    Weight as of this encounter: 192 lb 9.6 oz (87.4 kg).         Counseling Resources:  ATP IV Guidelines  Pooled Cohorts Equation Calculator  FRAX Risk Assessment  ICSI Preventive Guidelines  Dietary Guidelines for Americans, 2010  USDA's MyPlate  ASA Prophylaxis  Lung CA Screening    Beatriz Edmondson MD  [facility] SystemAnswers for HPI/ROS submitted by the patient on 9/12/2017   PHQ-2 Score: 0    "

## 2017-09-16 LAB
PSA SERPL-ACNC: 2.49 UG/L (ref 0–4)
T PALLIDUM IGG+IGM SER QL: NEGATIVE

## 2017-09-17 LAB
C TRACH DNA SPEC QL NAA+PROBE: NEGATIVE
N GONORRHOEA DNA SPEC QL NAA+PROBE: NEGATIVE
SPECIMEN SOURCE: NORMAL
SPECIMEN SOURCE: NORMAL

## 2017-09-18 LAB — HIV 1+2 AB+HIV1 P24 AG SERPL QL IA: NONREACTIVE

## 2017-09-20 NOTE — PROGRESS NOTES
Here are your lab results from your recent visit...  -Your STD labs (gonorrhea, chlamydia, HIV and syphilis) are all negative.  -Your PSA lab also looks normal.    Please let me know if you have any questions.  Best,   Graeme Edmondson MD

## 2017-12-18 ENCOUNTER — THERAPY VISIT (OUTPATIENT)
Dept: PHYSICAL THERAPY | Facility: CLINIC | Age: 57
End: 2017-12-18
Payer: COMMERCIAL

## 2017-12-18 ENCOUNTER — TELEPHONE (OUTPATIENT)
Dept: FAMILY MEDICINE | Facility: CLINIC | Age: 57
End: 2017-12-18

## 2017-12-18 DIAGNOSIS — M54.50 MIDLINE LOW BACK PAIN WITHOUT SCIATICA, UNSPECIFIED CHRONICITY: Primary | ICD-10-CM

## 2017-12-18 DIAGNOSIS — M54.50 LOW BACK PAIN: Primary | ICD-10-CM

## 2017-12-18 PROBLEM — M25.572 ANKLE PAIN, LEFT: Status: RESOLVED | Noted: 2017-01-16 | Resolved: 2017-12-18

## 2017-12-18 PROCEDURE — 97530 THERAPEUTIC ACTIVITIES: CPT | Mod: GP | Performed by: PHYSICAL THERAPIST

## 2017-12-18 PROCEDURE — 97161 PT EVAL LOW COMPLEX 20 MIN: CPT | Mod: GP | Performed by: PHYSICAL THERAPIST

## 2017-12-18 PROCEDURE — 97110 THERAPEUTIC EXERCISES: CPT | Mod: GP | Performed by: PHYSICAL THERAPIST

## 2017-12-18 NOTE — TELEPHONE ENCOUNTER
Reason for Call: Request for an order or referral:    Order or referral being requested: Physical therapy    Date needed: as soon as possible    Has the patient been seen by the PCP for this problem? YES    Additional comments: lower back pain- saw CW who ordered shoulder PT in February, hoping to get order for new issue so insurance will cover. Had appt this am 12/18 [place] [facility] [place]     Phone number Patient can be reached at:  Cell number on file:    Telephone Information:   Mobile [phone]       Best Time:  any    Can we leave a detailed message on this number?  YES    Call taken on 12/18/2017 at 10:52 AM by Gypsy Saleh

## 2017-12-18 NOTE — PROGRESS NOTES
"[place] [facility] Evaluation    Subjective:    Patient is a 57 year old male presenting with rehab back hpi.   Gabe Moon is a 57 year old male with a lumbar condition.  Condition occurred with:  Repetition/overuse.  Condition occurred: at home.  This is a new condition  12/17/17.  Pt stated he has had back pain on/off for years.  He had been previously managing his pain with stretching every morning, moving cautiously when bending, \"setting core.\"  He stated that his back had been feeling a little irritable last week, then yesterday morning he was \"too ambitious\" and reached for something; symptoms became progressively more sore as the day went on.  Has been taking ice and anti-inflammatories.  He tried his prone press-ups which increased his symptoms.  Pt stated he is looking for confirmation that he is experiencing an exacerbation of symptoms and nothing more concerning.  He also would like some exercises to maintain \"muscle activation\" until he can resume his usual routine..    Patient reports pain:  Mid lumbar spine (occasionally shoots to R lumbar).  Radiates to:  No radiation.  Pain is described as shooting, sharp and aching and is intermittent and reported as 6/10.  Associated symptoms:  Loss of motion/stiffness. Pain is worse in the A.M..  Symptoms are exacerbated by bending, sitting, standing and other (reaching) and relieved by other, ice and NSAID's (walking).  Since onset symptoms are gradually improving.        General health as reported by patient is excellent.  Pertinent medical history includes:  Other (hx of lumbar pain).  Medical allergies: no.  Other surgeries include:  Other (hernia repair).  Current medications:  Anti-inflammatory.  Current occupation is .  Patient is working in normal job without restrictions.  Primary job tasks include:  Prolonged sitting, prolonged standing and driving (computer work; pt has sit/stand desk).    Barriers include:  None as " "reported by the patient.    Red flags:  None as reported by the patient.                        Objective:    Standing Alignment:        Lumbar:  Anterior pelvic tilt  Pelvic:  Normal                    Physical Exam     Functional Tests:  Forward bend: pain descending with lumbar flexion, less painful if spine stays neutral and motion comes from hips   Back bend: no change in sxs  L trunk rotation:  no change in sxs  R trunk rotation: no change in sxs  L trunk lateral flexion: \"catch\" initially then no change in sxs  R trunk lateral flexion: no change in sxs  L single leg stance: mild trendelenburg, no change in sxs  R single leg stance: mild trendelenburg, no change in sxs    Sensation:  Light touch intact and symmetrical B LEs except slightly impaired L lateral ankle    Strength:  Hip flexion: B 5/5 pain free  Knee extension: B 5/5 pain free  Dorsiflexion: B 5/5 pain free  Glut med: B 5/5 pain free  Glut max: not assessed today - pt uncomfortable in prone  Hamstrings: not assessed today - pt uncomfortable in prone    ROM:  AROM of trunk WNL    Palpation:  Non-tender B lumbar paraspinals and spinous processes    Edema:  None noted    Gait:  WNL      General     ROS    Assessment/Plan:      Patient is a 57 year old male with lumbar complaints.    Patient has the following significant findings with corresponding treatment plan.                Diagnosis 1:  LBP  Pain -  hot/cold therapy, manual therapy, education and home program  Impaired muscle performance - neuro re-education and home program  Decreased function - therapeutic activities and home program  Impaired posture - neuro re-education, therapeutic activities and home program    Therapy Evaluation Codes:   1) History comprised of:   Personal factors that impact the plan of care:      Past/current experiences.    Comorbidity factors that impact the plan of care are:      None.     Medications impacting care: Anti-inflammatory.  2) Examination of Body Systems " comprised of:   Body structures and functions that impact the plan of care:      Lumbar spine.   Activity limitations that impact the plan of care are:      Bending, Sitting, Standing and reaching.  3) Clinical presentation characteristics are:   Stable/Uncomplicated.  4) Decision-Making    Low complexity using standardized patient assessment instrument and/or measureable assessment of functional outcome.  Cumulative Therapy Evaluation is: Low complexity.    Previous and current functional limitations:  (See Goal Flow Sheet for this information)    Short term and Long term goals: (See Goal Flow Sheet for this information)     Communication ability:  Patient appears to be able to clearly communicate and understand verbal and written communication and follow directions correctly.  Treatment Explanation - The following has been discussed with the patient:   RX ordered/plan of care  Anticipated outcomes  Possible risks and side effects  This patient would benefit from PT intervention to resume normal activities.   Rehab potential is good.    Frequency:  1 X week, once daily  Duration:  for 3 weeks  Discharge Plan:  Achieve all LTG.  Independent in home treatment program.  Reach maximal therapeutic benefit.    Please refer to the daily flowsheet for treatment today, total treatment time and time spent performing 1:1 timed codes.

## 2017-12-18 NOTE — MR AVS SNAPSHOT
After Visit Summary   12/18/2017    Gabe Moon    MRN: [id number]           Patient Information     Date Of Birth          1960        Visit Information        Provider Department      12/18/2017 10:00 AM Rahat Phelan PT [facility]        Today's Diagnoses     Low back pain    -  1       Follow-ups after your visit        Who to contact     If you have questions or need follow up information about today's clinic visit or your schedule please contact [facility] directly at [phone].  Normal or non-critical lab and imaging results will be communicated to you by SafeAwakehart, letter or phone within 4 business days after the clinic has received the results. If you do not hear from us within 7 days, please contact the clinic through Plycet or phone. If you have a critical or abnormal lab result, we will notify you by phone as soon as possible.  Submit refill requests through [employer] or call your pharmacy and they will forward the refill request to us. Please allow 3 business days for your refill to be completed.          Additional Information About Your Visit        MyChart Information     [employer] gives you secure access to your electronic health record. If you see a primary care provider, you can also send messages to your care team and make appointments. If you have questions, please call your primary care clinic.  If you do not have a primary care provider, please call [phone] and they will assist you.        Care EveryWhere ID     This is your Care EveryWhere ID. This could be used by other organizations to access your [facility] medical records  [id number]         Blood Pressure from Last 3 Encounters:   09/15/17 126/72   01/11/17 110/72   09/14/16 110/70    Weight from Last 3 Encounters:   09/15/17 87.4 kg (192 lb 9.6 oz)   01/11/17 88.6 kg (195 lb 4.8 oz)   09/14/16 86.7 kg (191 lb 1.6 oz)               We Performed the Following     HC PT EVAL, LOW COMPLEXITY     ARACELIS INITIAL EVAL REPORT     THERAPEUTIC ACTIVITIES     THERAPEUTIC EXERCISES        Primary Care Provider Office Phone # Fax #    Beatriz Edmondson [phone] [address] [address]        Equal Access to Services     DESTINENRIQUE KATIE : Hadii aad ku hadasho Soomaali, waaxda luqadaha, qaybta kaalmada adeegyada, waxay idiin hayaan adeeg kharash ladiamondn . So [facility] [phone].    ATENCIÓN: Si habla español, tiene a arias disposición servicios gratuitos de asistencia lingüística. Jenna al [phone].    We comply with applicable federal civil rights laws and [place] laws. We do not discriminate on the basis of race, color, national origin, age, disability, sex, sexual orientation, or gender identity.            Thank you!     Thank you for choosing [facility]  for your care. Our goal is always to provide you with excellent care. Hearing back from our patients is one way we can continue to improve our services. Please take a few minutes to complete the written survey that you may receive in the mail after your visit with us. Thank you!             Your Updated Medication List - Protect others around you: Learn how to safely use, store and throw away your medicines at www.disposemymeds.org.          This list is accurate as of: 12/18/17  1:14 PM.  Always use your most recent med list.                   Brand Name Dispense Instructions for use Diagnosis    cholecalciferol 1000 UNIT tablet    vitamin D3    100 tablet    Take 1 tablet (1,000 Units) by mouth daily    Encounter for routine adult health examination without abnormal findings       multivitamin, therapeutic with minerals Tabs tablet     100 tablet    Take 1 tablet by mouth daily    Encounter for routine adult health examination without abnormal findings       omega 3 1000 MG Caps     90 capsule     Take 1 g by mouth daily    Encounter for routine adult health examination without abnormal findings

## 2017-12-18 NOTE — TELEPHONE ENCOUNTER
JF  Please address due to CW's absence.  Patient saw PT this am  Patient called asking for PT referral for low back pain.  States it flares up off and on.  Located mid lower back. Does not radiate.  States he has mentioned to CW in past.  Note from 9/15/17 physical:  Jts have been better than ever- doing exercises- shoulder and back are better after doing PT.    Order T'd up.  Thanks, Sonia Blake RN

## 2018-02-07 ENCOUNTER — OFFICE VISIT (OUTPATIENT)
Dept: FAMILY MEDICINE | Facility: CLINIC | Age: 58
End: 2018-02-07
Payer: COMMERCIAL

## 2018-02-07 DIAGNOSIS — L72.0 MILIA: ICD-10-CM

## 2018-02-07 DIAGNOSIS — L82.0 INFLAMED SEBORRHEIC KERATOSIS: Primary | ICD-10-CM

## 2018-02-07 PROCEDURE — 17110 DESTRUCTION B9 LES UP TO 14: CPT | Mod: 51 | Performed by: FAMILY MEDICINE

## 2018-02-07 PROCEDURE — 10040 EXTRACTION: CPT | Performed by: FAMILY MEDICINE

## 2018-02-07 NOTE — PROGRESS NOTES
[facility] - PRIMARY CARE SKIN    CC : Lesion(s)  SUBJECTIVE:                                                    Gabe Moon is a 57 year old male who presents to clinic today because of lesions on the scalp and face.    Bothersome lesions noticed by the patient or other skin concerns :  Issue One : White marks on the face and scalp. They are most prominent on the scalp when hair is cut short. Lesions have a rough texture and have been irritated when combing hair.  Onset : unknown.  Enlarging : NO.  Bleeding : NO.  Itchy or irritating : NO.  Pain or tenderness : NO.  Changing color : NO.    Personal history of skin cancer : NO - has had lesions on scalp excised and treated with cryotherapy.  Acne : YES, eczema : YES  Family history of skin cancer : NO.  Acne : YES, eczema : YES    Sun Exposure History  Previous history of significant sun exposure:  Blistering sunburns : NO  Tanning beds : NO  Sunscreen Use : YES, frequency : daily, SPF : 25-30.  Sun-protective clothing use : No  Wide-brimmed hats : Yes  Sunglasses : Yes  Seeks shade : Yes    Occupation :  (indoor).    Refer to electronic medical record (EMR) for past medical history and medications.    INTEGUMENTARY/SKIN: POSITIVE for changing lesions  ROS : 14 point review of systems was negative except the symptoms listed above in the HPI.    This document serves as a record of the services and decisions personally performed and made by Franny Flores MD. It was created on her behalf by Cody Harp, a trained medical scribe.  The creation of this document is based on the scribe's personal observations and the provider's statements to the medical scribe.  Cody Harp, February 7, 2018 7:55 AM      OBJECTIVE:                                                    GENERAL: healthy, alert and no distress  SKIN: Fletcher Skin Type - III.  Scalp and Face were examined. The dermatoscope was used to help evaluate pigmented lesions.  Skin Pertinent  Findings:  Left cheek : Two 2- mm in size, raised, smooth, white lesion consistent with milia.  Name : Removal of milia.  Completed by : Franny Flores MD  Note : Discussed risks of the excision procedure, including pain, infection, scarring, hypopigmentation, hyperpigmentation and potential for recurrence or need for re-treatment. Discussed that definitive removal may require referral to plastic surgeon. Benefits of treatment and alternative treatments were also discussed.    During this procedure, the universal protocol was utilized. The patient's identity was confirmed by no less than two patient identifiers, correct procedure was verified, correct site was verified and marked as applicable and a final pause was completed.    Sterile technique was used throughout the procedure. The skin was cleaned and prepped with alcohol. Lesion was deroofed using an #11 blade. Calcified material was expressed.     Direct pressure was applied for hemostasis. No bleeding was present upon the completion of the procedure. A dry sterile dressing and antibacterial ointment was applied. Patient tolerated the procedure well and was discharged in satisfactory condition.    Right parietal scalp : 2 x 1 cm in size stuck-on appearing papules, raised, brown, coarse-textured, round lesion(s) most consistent with seborrheic keratoses.  Name : Liquid Nitrogen Cry-Ac Cryotherapy.  Indication : Irritated/Inflamed Benign Lesion.  Location(s) : right parietal scalp - x1.  Completed by : Franny Flores MD  Note : Discussed natural history of lesion and treatment options. Prior to treatment, we discussed inflammation, tenderness post-procedure, the healing process, and the risks of pain, infection, scarring, blistering, and hypo-/hyperpigmentation after healing. Explained that these lesions may grow back and may need additional treatment or re-treatment. The patient expressed a desire to proceed with cryotherapy.    The lesion(s) were treated with  liquid nitrogen Cry-Ac, five second freeze repeated twice with a pause to allow for the area to thaw.    Patient tolerated the procedure well and left in good condition.  Total number of lesions treated : 1.  Treatment number : 1.    Diagnostic Test Results:  none         ASSESSMENT:                                                      Encounter Diagnoses   Name Primary?     Inflamed seborrheic keratosis Yes     Milia          PLAN:                                                    Patient Instructions   FUTURE APPOINTMENTS  Follow up as needed.    CRYOTHERAPY POST-TREATMENT CARE INSTRUCTIONS  Liquid nitrogen is mildly uncomfortable when applied to the skin, but the discomfort rapidly subsides.    Post-Treatment:  You may experience burning and/or stinging immediately following the procedure. The discomfort from the procedure may persist over the next 12-24 hours. The area treated will look pinker and slightly swollen before the healing process begins. You may also notice redness, swelling, tenderness, weeping and crusts or scabs. Healing time is approximately 10-14 days.    Blister - You may or may notice blistering from the freezing. If you develop an uncomfortable blister from today's treatment, you may gently puncture this with a needle that has been cleaned with alcohol. However, do not remove the protective skin layer of the blister.    Scab - After a few days, you may notice scaliness or scab formation. Do not pick at the scabs because this may cause slower healing and a permanent scar.    The skin may appear temporarily darker at the treatment site, but this usually fades over a period of months, provided that the area is protected from the sun.    Care of the areas treated:    Wash the area with a mild cleanser.    Gently pat dry.    Do not rub.     Keep protected from the sun during the healing process and for a full year following treatment as the skin continues to remodel during this time.    If you  experience dryness or persistent burning, you may use Vaseline or Aquaphor ointment sparingly.    Do not use Neosporin, as many people eventually develop a medication allergy, that can easily be confused with an infection, to Neomycin.    Return if:  If there is any concern that the lesion has persisted, make an appointment for a re-check. Healing time does vary depending on your individual healing process and the area of the body treated. Most patients will be healed in one month.    Signs of Infection:  Thankfully this is rare. However if you notice persistent colored drainage, increasing pain, fever or other signs of infection, please call back at [phone].        PROCEDURES:                                                    None.    TT : 20 minutes.  CT : 15 minutes.      The information in this document, created by the medical scribe for me, accurately reflects the services I personally performed and the decisions made by me. I have reviewed and approved this document for accuracy prior to leaving the patient care area.  Franny Flores MD February 7, 2018 7:55 AM  [facility]

## 2018-02-07 NOTE — LETTER
2/7/2018         RE: Gabe Moon  [address]   [place] [address]        Dear Colleague,    Thank you for [place] your [place], [place], [place] [facility] Please see a copy of my visit note below.    [facility] - PRIMARY CARE SKIN    CC : Lesion(s)  SUBJECTIVE:                                                    Gabe Moon is a 57 year old male who presents to clinic today because of lesions on the scalp and face.    Bothersome lesions noticed by the patient or other skin concerns :  Issue One : White marks on the face and scalp. They are most prominent on the scalp when hair is cut short. Lesions have a rough texture and have been irritated when combing hair.  Onset : unknown.  Enlarging : NO.  Bleeding : NO.  Itchy or irritating : NO.  Pain or tenderness : NO.  Changing color : NO.    Personal history of skin cancer : NO - has had lesions on scalp excised and treated with cryotherapy.  Acne : YES, eczema : YES  Family history of skin cancer : NO.  Acne : YES, eczema : YES    Sun Exposure History  Previous history of significant sun exposure:  Blistering sunburns : NO  Tanning beds : NO  Sunscreen Use : YES, frequency : daily, SPF : 25-30.  Sun-protective clothing use : No  Wide-brimmed hats : Yes  Sunglasses : Yes  Seeks shade : Yes    Occupation :  (indoor).    Refer to electronic medical record (EMR) for past medical history and medications.    INTEGUMENTARY/SKIN: POSITIVE for changing lesions  ROS : 14 point review of systems was negative except the symptoms listed above in the HPI.    This document serves as a record of the services and decisions personally performed and made by Franny Flores MD. It was created on her behalf by Cody Harp, a trained medical scribe.  The creation of this document is based on the scribe's personal observations and the provider's statements to the medical scribe.  Cody Harp, February 7, 2018 7:55  AM      OBJECTIVE:                                                    GENERAL: healthy, alert and no distress  SKIN: Fletcher Skin Type - III.  Scalp and Face were examined. The dermatoscope was used to help evaluate pigmented lesions.  Skin Pertinent Findings:  Left cheek : Two 2- mm in size, raised, smooth, white lesion consistent with milia.  Name : Removal of milia.  Completed by : Franny Flores MD  Note : Discussed risks of the excision procedure, including pain, infection, scarring, hypopigmentation, hyperpigmentation and potential for recurrence or need for re-treatment. Discussed that definitive removal may require referral to plastic surgeon. Benefits of treatment and alternative treatments were also discussed.    During this procedure, the universal protocol was utilized. The patient's identity was confirmed by no less than two patient identifiers, correct procedure was verified, correct site was verified and marked as applicable and a final pause was completed.    Sterile technique was used throughout the procedure. The skin was cleaned and prepped with alcohol. Lesion was deroofed using an #11 blade. Calcified material was expressed.     Direct pressure was applied for hemostasis. No bleeding was present upon the completion of the procedure. A dry sterile dressing and antibacterial ointment was applied. Patient tolerated the procedure well and was discharged in satisfactory condition.    Right parietal scalp : 2 x 1 cm in size stuck-on appearing papules, raised, brown, coarse-textured, round lesion(s) most consistent with seborrheic keratoses.  Name : Liquid Nitrogen Cry-Ac Cryotherapy.  Indication : Irritated/Inflamed Benign Lesion.  Location(s) : right parietal scalp - x1.  Completed by : Franny Flores MD  Note : Discussed natural history of lesion and treatment options. Prior to treatment, we discussed inflammation, tenderness post-procedure, the healing process, and the risks of pain, infection,  scarring, blistering, and hypo-/hyperpigmentation after healing. Explained that these lesions may grow back and may need additional treatment or re-treatment. The patient expressed a desire to proceed with cryotherapy.    The lesion(s) were treated with liquid nitrogen Cry-Ac, five second freeze repeated twice with a pause to allow for the area to thaw.    Patient tolerated the procedure well and left in good condition.  Total number of lesions treated : 1.  Treatment number : 1.    Diagnostic Test Results:  none         ASSESSMENT:                                                      Encounter Diagnoses   Name Primary?     Inflamed seborrheic keratosis Yes     Milia          PLAN:                                                    Patient Instructions   FUTURE APPOINTMENTS  Follow up as needed.    CRYOTHERAPY POST-TREATMENT CARE INSTRUCTIONS  Liquid nitrogen is mildly uncomfortable when applied to the skin, but the discomfort rapidly subsides.    Post-Treatment:  You may experience burning and/or stinging immediately following the procedure. The discomfort from the procedure may persist over the next 12-24 hours. The area treated will look pinker and slightly swollen before the healing process begins. You may also notice redness, swelling, tenderness, weeping and crusts or scabs. Healing time is approximately 10-14 days.    Blister - You may or may notice blistering from the freezing. If you develop an uncomfortable blister from today's treatment, you may gently puncture this with a needle that has been cleaned with alcohol. However, do not remove the protective skin layer of the blister.    Scab - After a few days, you may notice scaliness or scab formation. Do not pick at the scabs because this may cause slower healing and a permanent scar.    The skin may appear temporarily darker at the treatment site, but this usually fades over a period of months, provided that the area is protected from the sun.    Care of  the areas treated:    Wash the area with a mild cleanser.    Gently pat dry.    Do not rub.     Keep protected from the sun during the healing process and for a full year following treatment as the skin continues to remodel during this time.    If you experience dryness or persistent burning, you may use Vaseline or Aquaphor ointment sparingly.    Do not use Neosporin, as many people eventually develop a medication allergy, that can easily be confused with an infection, to Neomycin.    Return if:  If there is any concern that the lesion has persisted, make an appointment for a re-check. Healing time does vary depending on your individual healing process and the area of the body treated. Most patients will be healed in one month.    Signs of Infection:  Thankfully this is rare. However if you notice persistent colored drainage, increasing pain, fever or other signs of infection, please call back at [phone].        PROCEDURES:                                                    None.    TT : 20 minutes.  CT : 15 minutes.      The information in this document, created by the medical scribe for me, accurately reflects the services I personally performed and the decisions made by me. I have reviewed and approved this document for accuracy prior to leaving the patient care area.  Franny Flores MD February 7, 2018 7:55 AM  [facility]    Again, thank you for allowing me to participate in the care of your patient.        Sincerely,        Tahira Flores MD

## 2018-02-07 NOTE — MR AVS SNAPSHOT
After Visit Summary   2/7/2018    Gabe Moon    MRN: [id number]           Patient Information     Date Of Birth          1960        Visit Information        Provider Department      2/7/2018 8:00 AM Tahira Flores [place] [facility] [place]        Today's Diagnoses     Inflamed seborrheic keratosis    -  1    Milia          Care Instructions    FUTURE APPOINTMENTS  Follow up as needed.    CRYOTHERAPY POST-TREATMENT CARE INSTRUCTIONS  Liquid nitrogen is mildly uncomfortable when applied to the skin, but the discomfort rapidly subsides.    Post-Treatment:  You may experience burning and/or stinging immediately following the procedure. The discomfort from the procedure may persist over the next 12-24 hours. The area treated will look pinker and slightly swollen before the healing process begins. You may also notice redness, swelling, tenderness, weeping and crusts or scabs. Healing time is approximately 10-14 days.    Blister - You may or may notice blistering from the freezing. If you develop an uncomfortable blister from today's treatment, you may gently puncture this with a needle that has been cleaned with alcohol. However, do not remove the protective skin layer of the blister.    Scab - After a few days, you may notice scaliness or scab formation. Do not pick at the scabs because this may cause slower healing and a permanent scar.    The skin may appear temporarily darker at the treatment site, but this usually fades over a period of months, provided that the area is protected from the sun.    Care of the areas treated:    Wash the area with a mild cleanser.    Gently pat dry.    Do not rub.     Keep protected from the sun during the healing process and for a full year following treatment as the skin continues to remodel during this time.    If you experience dryness or persistent burning, you may use Vaseline or Aquaphor ointment sparingly.    Do not use Neosporin, as  many people eventually develop a medication allergy, that can easily be confused with an infection, to Neomycin.    Return if:  If there is any concern that the lesion has persisted, make an appointment for a re-check. Healing time does vary depending on your individual healing process and the area of the body treated. Most patients will be healed in one month.    Signs of Infection:  Thankfully this is rare. However if you notice persistent colored drainage, increasing pain, fever or other signs of infection, please call back at [phone].          Follow-ups after your visit        Who to contact     If you have questions or need follow up information about today's clinic visit or your schedule please contact [facility] directly at [phone].  Normal or non-critical lab and imaging results will be communicated to you by MyChart, letter or phone within 4 business days after the clinic has received the results. If you do not hear from us within 7 days, please contact the clinic through MyChart or phone. If you have a critical or abnormal lab result, we will notify you by phone as soon as possible.  Submit refill requests through [email or web address] or call your pharmacy and they will forward the refill request to us. Please allow 3 business days for your refill to be completed.          Additional Information About Your Visit        [employer]ee[employer] Information     [email or web address] gives you secure access to your electronic health record. If you see a primary care provider, you can also send messages to your care team and make appointments. If you have questions, please call your primary care clinic.  If you do not have a primary care provider, please call [phone] and they will assist you.        Care EveryWhere ID     This is your Care EveryWhere ID. This could be used by other organizations to access your [place] medical records  [id number]         Blood Pressure from Last 3 Encounters:   09/15/17 126/72    01/11/17 110/72   09/14/16 110/70    Weight from Last 3 Encounters:   09/15/17 192 lb 9.6 oz (87.4 kg)   01/11/17 195 lb 4.8 oz (88.6 kg)   09/14/16 191 lb 1.6 oz (86.7 kg)              Today, you had the following     No orders found for display       Primary Care Provider Office Phone # Fax #    Beatriz Edmondson [phone] [facility] [facility] [address] [address]        Equal Access to Services     [facility]: Hadii aad ku hadasho Soomaali, waaxda luqadaha, qaybta kaalmada adeegyagiovanna, alida ruiz . So [facility] [phone].    ATENCIÓN: Si habla español, tiene a arias disposición servicios gratuitos de asistencia lingüística. [facility] [phone].    We comply with applicable federal civil rights laws and [place] laws. We do not discriminate on the basis of race, color, national origin, age, disability, sex, sexual orientation, or gender identity.            Thank you!     Thank you for choosing [facility]  for your care. Our goal is always to provide you with excellent care. Hearing back from our patients is one way we can continue to improve our services. Please take a few minutes to complete the written survey that you may receive in the mail after your visit with us. Thank you!             Your Updated Medication List - Protect others around you: Learn how to safely use, store and throw away your medicines at www.disposemymeds.org.          This list is accurate as of 2/7/18  8:09 AM.  Always use your most recent med list.                   Brand Name Dispense Instructions for use Diagnosis    cholecalciferol 1000 UNIT tablet    vitamin D3    100 tablet    Take 1 tablet (1,000 Units) by mouth daily    Encounter for routine adult health examination without abnormal findings       multivitamin, therapeutic with minerals Tabs tablet     100 tablet    Take 1 tablet by mouth daily    Encounter for routine adult health examination  without abnormal findings       omega 3 1000 MG Caps     90 capsule    Take 1 g by mouth daily    Encounter for routine adult health examination without abnormal findings

## 2018-02-07 NOTE — PATIENT INSTRUCTIONS
FUTURE APPOINTMENTS  Follow up as needed.    CRYOTHERAPY POST-TREATMENT CARE INSTRUCTIONS  Liquid nitrogen is mildly uncomfortable when applied to the skin, but the discomfort rapidly subsides.    Post-Treatment:  You may experience burning and/or stinging immediately following the procedure. The discomfort from the procedure may persist over the next 12-24 hours. The area treated will look pinker and slightly swollen before the healing process begins. You may also notice redness, swelling, tenderness, weeping and crusts or scabs. Healing time is approximately 10-14 days.    Blister - You may or may notice blistering from the freezing. If you develop an uncomfortable blister from today's treatment, you may gently puncture this with a needle that has been cleaned with alcohol. However, do not remove the protective skin layer of the blister.    Scab - After a few days, you may notice scaliness or scab formation. Do not pick at the scabs because this may cause slower healing and a permanent scar.    The skin may appear temporarily darker at the treatment site, but this usually fades over a period of months, provided that the area is protected from the sun.    Care of the areas treated:    Wash the area with a mild cleanser.    Gently pat dry.    Do not rub.     Keep protected from the sun during the healing process and for a full year following treatment as the skin continues to remodel during this time.    If you experience dryness or persistent burning, you may use Vaseline or Aquaphor ointment sparingly.    Do not use Neosporin, as many people eventually develop a medication allergy, that can easily be confused with an infection, to Neomycin.    Return if:  If there is any concern that the lesion has persisted, make an appointment for a re-check. Healing time does vary depending on your individual healing process and the area of the body treated. Most patients will be healed in one month.    Signs of  Infection:  Thankfully this is rare. However if you notice persistent colored drainage, increasing pain, fever or other signs of infection, please call back at [phone].

## 2018-07-16 ENCOUNTER — OFFICE VISIT (OUTPATIENT)
Dept: FAMILY MEDICINE | Facility: CLINIC | Age: 58
End: 2018-07-16
Payer: COMMERCIAL

## 2018-07-16 VITALS
WEIGHT: 192 LBS | BODY MASS INDEX: 23.87 KG/M2 | OXYGEN SATURATION: 97 % | SYSTOLIC BLOOD PRESSURE: 117 MMHG | HEART RATE: 58 BPM | RESPIRATION RATE: 14 BRPM | HEIGHT: 75 IN | TEMPERATURE: 97.6 F | DIASTOLIC BLOOD PRESSURE: 69 MMHG

## 2018-07-16 DIAGNOSIS — H61.23 BILATERAL IMPACTED CERUMEN: Primary | ICD-10-CM

## 2018-07-16 PROCEDURE — 69209 REMOVE IMPACTED EAR WAX UNI: CPT | Mod: 50 | Performed by: PHYSICIAN ASSISTANT

## 2018-07-16 PROCEDURE — 99212 OFFICE O/P EST SF 10 MIN: CPT | Mod: 25 | Performed by: PHYSICIAN ASSISTANT

## 2018-07-16 NOTE — MR AVS SNAPSHOT
"              After Visit Summary   7/16/2018    Gabe Moon    MRN: [id number]           Patient Information     Date Of Birth          1960        Visit Information        Provider Department      7/16/2018 9:40 AM Sagar Dee PA-C [facility]        Today's Diagnoses     Bilateral impacted cerumen    -  1       Follow-ups after your visit        Follow-up notes from your care team     Return if symptoms worsen or fail to improve.      Who to contact     If you have questions or need follow up information about today's clinic visit or your schedule please contact [facility] directly at [phone].  Normal or non-critical lab and imaging results will be communicated to you by MyChart, letter or phone within 4 business days after the clinic has received the results. If you do not hear from us within 7 days, please contact the clinic through [facility] or phone. If you have a critical or abnormal lab result, we will notify you by phone as soon as possible.  Submit refill requests through [employer] or call your pharmacy and they will forward the refill request to us. Please allow 3 business days for your refill to be completed.          Additional Information About Your Visit        MyChart Information     [employer] gives you secure access to your electronic health record. If you see a primary care provider, you can also send messages to your care team and make appointments. If you have questions, please call your primary care clinic.  If you do not have a primary care provider, please call [phone] and they will assist you.        Care EveryWhere ID     This is your Care EveryWhere ID. This could be used by other organizations to access your [place] medical records  [id number]        Your Vitals Were     Pulse Temperature Respirations Height Pulse Oximetry BMI (Body Mass Index)    58 97.6  F (36.4  C) (Oral) 14 6' 2.5\" (1.892 m) 97% 24.32 kg/m2       Blood Pressure from " Last 3 Encounters:   07/16/18 117/69   09/15/17 126/72   01/11/17 110/72    Weight from Last 3 Encounters:   07/16/18 192 lb (87.1 kg)   09/15/17 192 lb 9.6 oz (87.4 kg)   01/11/17 195 lb 4.8 oz (88.6 kg)              We Performed the Following     HC REMOVAL IMPACTED CERUMEN IRRIGATION/LVG UNILAT        Primary Care Provider Office Phone # Fax #    Beatriz Edmondson [phone]       [address] [address] [address]        Equal Access to Services     [facility]: Hadii aad ku hadasho Sosantiali, waaxda luqadaha, qaybta kaalmada adeegyada, alida ruiz . So [facility] [phone].    ATENCIÓN: Si habla español, tiene a arias disposición servicios gratuitos de asistencia lingüística. Llame al [phone].    We comply with applicable federal civil rights laws and [place] laws. We do not discriminate on the basis of race, color, national origin, age, disability, sex, sexual orientation, or gender identity.            Thank you!     Thank you for choosing [facility]  for your care. Our goal is always to provide you with excellent care. Hearing back from our patients is one way we can continue to improve our services. Please take a few minutes to complete the written survey that you may receive in the mail after your visit with us. Thank you!             Your Updated Medication List - Protect others around you: Learn how to safely use, store and throw away your medicines at www.disposemymeds.org.          This list is accurate as of 7/16/18 11:59 PM.  Always use your most recent med list.                   Brand Name Dispense Instructions for use Diagnosis    cholecalciferol 1000 UNIT tablet    vitamin D3    100 tablet    Take 1 tablet (1,000 Units) by mouth daily    Encounter for routine adult health examination without abnormal findings       multivitamin, therapeutic with minerals Tabs tablet     100 tablet    Take 1 tablet by mouth daily     Encounter for routine adult health examination without abnormal findings       omega 3 1000 MG Caps     90 capsule    Take 1 g by mouth daily    Encounter for routine adult health examination without abnormal findings

## 2018-07-16 NOTE — NURSING NOTE
"Chief Complaint   Patient presents with     Otalgia       Initial /69  Pulse 58  Temp 97.6  F (36.4  C) (Oral)  Resp 14  Ht 6' 2.5\" (1.892 m)  Wt 192 lb (87.1 kg)  SpO2 97%  BMI 24.32 kg/m2 Estimated body mass index is 24.32 kg/(m^2) as calculated from the following:    Height as of this encounter: 6' 2.5\" (1.892 m).    Weight as of this encounter: 192 lb (87.1 kg).  BP completed using cuff size: regular    Health Maintenance that is potentially due pending provider review:  Health Maintenance Due   Topic Date Due     ADVANCE DIRECTIVE PLANNING Q5 YRS  03/11/2016         Per provider  "

## 2018-07-16 NOTE — PROGRESS NOTES
"  SUBJECTIVE:   Gabe Moon is a 58 year old male who presents to clinic today for the following health issues:      Ear pain      Duration: 3 weeks    Description (location/character/radiation): intermittent, bi-lat, changes sometimes from one to the other    Intensity:  mild    Accompanying signs and symptoms: sore throat, interrmittent    History (similar episodes/previous evaluation): None    Precipitating or alleviating factors: None    Therapies tried and outcome: None           Problem list and histories reviewed & adjusted, as indicated.  Additional history: 57 y/o male c/o some ongoing ear issues.  This has been on and off for the last 3 weeks, but a little more prominent the last few days.  Plugged sensations with a little discomfort.  Does swim a fair amount.    BP Readings from Last 3 Encounters:   07/16/18 117/69   09/15/17 126/72   01/11/17 110/72    Wt Readings from Last 3 Encounters:   07/16/18 192 lb (87.1 kg)   09/15/17 192 lb 9.6 oz (87.4 kg)   01/11/17 195 lb 4.8 oz (88.6 kg)                    Reviewed and updated as needed this visit by clinical staff  Tobacco  Allergies  Meds  Med Hx  Surg Hx  Fam Hx  Soc Hx      Reviewed and updated as needed this visit by Provider         ROS:  Constitutional, HEENT, cardiovascular, pulmonary, gi and gu systems are negative, except as otherwise noted.    OBJECTIVE:     /69  Pulse 58  Temp 97.6  F (36.4  C) (Oral)  Resp 14  Ht 6' 2.5\" (1.892 m)  Wt 192 lb (87.1 kg)  SpO2 97%  BMI 24.32 kg/m2  Body mass index is 24.32 kg/(m^2).  GENERAL: alert and no distress  EYES: Eyes grossly normal to inspection  HENT: normal cephalic/atraumatic, both ears: occluded with wax, nose and mouth without ulcers or lesions, oropharynx clear and oral mucous membranes moist  RESP: lungs clear to auscultation - no rales, rhonchi or wheezes  CV: regular rate and rhythm, normal S1 S2, no S3 or S4, no murmur, click or rub, no peripheral edema and peripheral " pulses strong  PSYCH: mentation appears normal, affect normal/bright    Diagnostic Test Results:  none     ASSESSMENT/PLAN:             1. Bilateral impacted cerumen  MA was able to remove cerumen b/l with irrigation.  Patient did leave before I recheck, so I assume that symptoms resolved.  - HC REMOVAL IMPACTED CERUMEN IRRIGATION/LVG UNILAT        Sagar Dee PA-C  [facility]

## 2019-01-25 ENCOUNTER — OFFICE VISIT (OUTPATIENT)
Dept: FAMILY MEDICINE | Facility: CLINIC | Age: 59
End: 2019-01-25
Payer: COMMERCIAL

## 2019-01-25 VITALS
RESPIRATION RATE: 16 BRPM | WEIGHT: 192 LBS | SYSTOLIC BLOOD PRESSURE: 125 MMHG | DIASTOLIC BLOOD PRESSURE: 73 MMHG | TEMPERATURE: 97.6 F | BODY MASS INDEX: 23.87 KG/M2 | HEIGHT: 75 IN | HEART RATE: 67 BPM

## 2019-01-25 DIAGNOSIS — Z00.00 ENCOUNTER FOR ROUTINE ADULT HEALTH EXAMINATION WITHOUT ABNORMAL FINDINGS: Primary | ICD-10-CM

## 2019-01-25 DIAGNOSIS — M79.671 RIGHT FOOT PAIN: ICD-10-CM

## 2019-01-25 DIAGNOSIS — M25.562 PAIN IN BOTH KNEES, UNSPECIFIED CHRONICITY: ICD-10-CM

## 2019-01-25 DIAGNOSIS — M25.561 PAIN IN BOTH KNEES, UNSPECIFIED CHRONICITY: ICD-10-CM

## 2019-01-25 DIAGNOSIS — J02.9 SORE THROAT: ICD-10-CM

## 2019-01-25 PROCEDURE — 99396 PREV VISIT EST AGE 40-64: CPT | Performed by: FAMILY MEDICINE

## 2019-01-25 ASSESSMENT — MIFFLIN-ST. JEOR: SCORE: 1773.6

## 2019-01-25 NOTE — PROGRESS NOTES
SUBJECTIVE:   CC: Gabe Moon is an 58 year old male who presents for preventative health visit.     Physical   Annual:     Getting at least 3 servings of Calcium per day:  Yes    Bi-annual eye exam:  Yes    Dental care twice a year:  Yes    Sleep apnea or symptoms of sleep apnea:  None    Diet:  Other    Frequency of exercise:  4-5 days/week    Duration of exercise:  45-60 minutes    Taking medications regularly:  Yes    Medication side effects:  None    Additional concerns today:  Yes    PHQ-2 Total Score: 0    He's had a sore throat off/on since early December.  Sometimes clearing his throat, off/on.  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  Will try humidifier, cough drops.    Edge of R outer foot, off/on sore.    Gets tender to touch.  Did hike on Wed on tough terrain.  Activity can trigger it.  On exam, does have prominent 5th metatarsal on right foot.    Knees have been aching for last month and a half- both of them.  Doing squats and climbing up stairs, around knee cap.  Works with a - will try and find alternatives to squats that put less pressure on his knees.      Today's PHQ-2 Score:   PHQ-2 ( 1999 Pfizer) 1/25/2019   Q1: Little interest or pleasure in doing things 0   Q2: Feeling down, depressed or hopeless 0   PHQ-2 Score 0   Q1: Little interest or pleasure in doing things Not at all   Q2: Feeling down, depressed or hopeless Not at all   PHQ-2 Score 0       Abuse: Current or Past(Physical, Sexual or Emotional)- No  Do you feel safe in your environment? Yes    Social History     Tobacco Use     Smoking status: Never Smoker     Smokeless tobacco: Never Used   Substance Use Topics     Alcohol use: Yes     Alcohol/week: 0.0 oz     Alcohol Use 1/25/2019   If you drink alcohol do you typically have greater than 3 drinks per day OR greater than 7 drinks per week? No       Last PSA:   PSA   Date Value Ref Range Status   09/15/2017 2.49 0 - 4 ug/L Final     Comment:     Assay Method:   Chemiluminescence using Siemens Vista analyzer       Reviewed orders with patient. Reviewed health maintenance and updated orders accordingly - Yes    Labs reviewed in EPIC  BP Readings from Last 3 Encounters:   19 125/73   18 117/69   09/15/17 126/72    Wt Readings from Last 3 Encounters:   19 87.1 kg (192 lb)   18 87.1 kg (192 lb)   09/15/17 87.4 kg (192 lb 9.6 oz)                  Patient Active Problem List   Diagnosis     Low back pain     Past Surgical History:   Procedure Laterality Date     HERNIA REPAIR      inguinal     LIGATE VEIN      ' scrotum, ' left leg       Social History     Tobacco Use     Smoking status: Never Smoker     Smokeless tobacco: Never Used   Substance Use Topics     Alcohol use: Yes     Alcohol/week: 0.0 oz     Family History   Problem Relation Age of Onset     Breast Cancer Mother      Alzheimer Disease Mother      Depression Mother      Mental Illness Mother         bipolar     Heart Murmur Mother         not murmur, racing heart     Diabetes Father      Hypertension Father      Prostate Cancer Father      Osteoarthritis Father         hip replacement (overwt)     Osteoarthritis Paternal Grandfather         hip replacements     Colon Cancer Maternal Cousin 53         of colon cancer at 58         Current Outpatient Medications   Medication Sig Dispense Refill     cholecalciferol (VITAMIN D) 1000 UNIT tablet Take 1 tablet (1,000 Units) by mouth daily 100 tablet 3     multivitamin, therapeutic with minerals (MULTI-VITAMIN) TABS tablet Take 1 tablet by mouth daily 100 tablet 0     omega 3 1000 MG CAPS Take 1 g by mouth daily 90 capsule      No Known Allergies  Recent Labs   Lab Test 09/15/16  0748   *   HDL 86   TRIG 57        Reviewed and updated as needed this visit by clinical staff  Tobacco  Allergies  Meds  Problems  Med Hx  Surg Hx  Fam Hx         Reviewed and updated as needed this visit by Provider  Tobacco  Allergies  Meds   "Problems  Med Hx  Surg Hx  Fam Hx            Review of Systems  10 point ROS of systems including Constitutional, Eyes, Respiratory, Cardiovascular, Gastroenterology, Genitourinary, Integumentary, Muscularskeletal, Psychiatric were all negative except for pertinent positives noted in my HPI.      OBJECTIVE:   /73 (BP Location: Left arm, Cuff Size: Adult Large)   Pulse 67   Temp 97.6  F (36.4  C) (Oral)   Resp 16   Ht 1.892 m (6' 2.5\")   Wt 87.1 kg (192 lb)   BMI 24.32 kg/m      Physical Exam  GENERAL: healthy, alert and no distress  EYES: Eyes grossly normal to inspection, PERRL and conjunctivae and sclerae normal  HENT: ear canals and TM's normal, nose and mouth without ulcers or lesions  NECK: no adenopathy, no asymmetry, masses, or scars and thyroid normal to palpation  RESP: lungs clear to auscultation - no rales, rhonchi or wheezes  CV: regular rate and rhythm, normal S1 S2, no S3 or S4, no murmur, click or rub, no peripheral edema and peripheral pulses strong  ABDOMEN: soft, nontender, no hepatosplenomegaly, no masses and bowel sounds normal  MS: no gross musculoskeletal defects noted other than prominent R 5th metatarsal, tender to touch around it, no edema  SKIN: no suspicious lesions or rashes  NEURO: Normal strength and tone, mentation intact and speech normal  PSYCH: mentation appears normal, affect normal/bright    Diagnostic Test Results:  He'll return for fasting labs    ASSESSMENT/PLAN:       ICD-10-CM    1. Encounter for routine adult health examination without abnormal findings Z00.00 Lipid panel reflex to direct LDL Fasting     **Glucose FUTURE anytime   2. Right foot pain M79.671 PODIATRY/FOOT & ANKLE SURGERY REFERRAL   3. Sore throat J02.9    4. Pain in both knees, unspecified chronicity M25.561     M25.562      CPE- Discussed diet, calcium and exercise.  Eyes and Teeth or UTD or recommended f/u.  No immunizations needed today.  See orders below for tests ordered and screening " "needed.  He will rtc for fasting labs.  Discussed PSA testing - will not check this time.  Cont to discuss pros/cons of testing.    R foot pain- irritated 5th metatarsal area after activity.  Rec f/u at [facility], and given podiatry referral.    Sore throat- on/off sx's since Dec, likely trouble healing due to cold, dry air, and talking so much for his work.  Rec talking less on weekends if able, and try cough drops and humidifier.    Bilateral knee pain- with stairs and squats. Rec finding non-irritating work-out options other than squats to avoid aggravating it further.  Will work with his .  Call for PT referral if not improving.    COUNSELING:   Reviewed preventive health counseling, as reflected in patient instructions    BP Readings from Last 1 Encounters:   01/25/19 125/73     Estimated body mass index is 24.32 kg/m  as calculated from the following:    Height as of this encounter: 1.892 m (6' 2.5\").    Weight as of this encounter: 87.1 kg (192 lb).    BP Screening:   Last 3 BP Readings:    BP Readings from Last 3 Encounters:   01/25/19 125/73   07/16/18 117/69   09/15/17 126/72       The following was recommended to the patient:  Re-screen BP within a year and recommended lifestyle modifications       reports that  has never smoked. he has never used smokeless tobacco.      Counseling Resources:  ATP IV Guidelines  Pooled Cohorts Equation Calculator  FRAX Risk Assessment  ICSI Preventive Guidelines  Dietary Guidelines for Americans, 2010  USDA's MyPlate  ASA Prophylaxis  Lung CA Screening    Beatriz Edmondson MD  [facility]  "

## 2019-01-31 ENCOUNTER — OFFICE VISIT (OUTPATIENT)
Dept: PODIATRY | Facility: CLINIC | Age: 59
End: 2019-01-31
Payer: COMMERCIAL

## 2019-01-31 VITALS
HEIGHT: 75 IN | SYSTOLIC BLOOD PRESSURE: 118 MMHG | WEIGHT: 192 LBS | BODY MASS INDEX: 23.87 KG/M2 | DIASTOLIC BLOOD PRESSURE: 68 MMHG

## 2019-01-31 DIAGNOSIS — M76.71 PERONEAL TENDONITIS, RIGHT: Primary | ICD-10-CM

## 2019-01-31 PROCEDURE — 99243 OFF/OP CNSLTJ NEW/EST LOW 30: CPT | Performed by: PODIATRIST

## 2019-01-31 ASSESSMENT — MIFFLIN-ST. JEOR: SCORE: 1773.6

## 2019-01-31 NOTE — PATIENT INSTRUCTIONS
Thank you for choosing [facility]!    DR. CELIS'S CLINIC LOCATIONS:   MONDAY - [place] TUESDAY - [place]   [address] [facility]  [address]   [place] [address] [place] [address]   [phone]       THURSDAY AM - [place] THURSDAY PM - [place]   [address]   [place] [address] [place], [place] [address] [phone]       FRIDAY AM - [place] SET UP SURGERY: [phone] [address] APPOINTMENTS: [phone]   [place] [address] BILLING QUESTIONS: [phone] FAX NUMBER: [phone]     Follow Up: 3 weeks    FYI: Body Mass Index (BMI)  Many things can cause foot and ankle problems. Foot structure, activity level, foot mechanics and injuries are common causes of pain. One very important issue that often goes unmentioned, is body weight. Extra weight can cause increased stress on muscles, ligaments, bones and tendons. Sometimes just a few extra pounds is all it takes to put one over her/his threshold. Without reducing that stress, it can be difficult to alleviate pain. Some people are uncomfortable addressing this issue, but we feel it is important for you to think about it. As Foot &  Ankle specialists, our job is addressing the lower extremity problem and possible causes. Regarding extra body weight, we encourage patients to discuss diet and weight management plans with their primary care doctors. It is this team approach that gives you the best opportunity for pain relief and getting you back on your feet.            PRICE THERAPY  Many aches and pains throughout the foot and ankle can be helped with many simple treatments. This is usually described as PRICE Therapy.      P - Protection - often times, inflammation/pain in the lower extremity is not able to improve simply because the areas involved are never allowed to rest. Every step we take can bother the problematic area.  Protecting those areas is an important step in the healing process. This may involve a walking cast boot, a special insert/orthotic device, an ankle brace, or simply avoiding barefoot walking.    R - Rest - in addition to protecting the foot/ankle, resting is an important, but often times difficult, treatment option. Getting off your feet when they bother you, and specifically avoiding activities that cause pain/discomfort, are very beneficial to prevent, and treat, foot/ankle pain.      I - Ice - icing regularly can help to decrease inflammation and swelling in the foot, thus decreasing pain. Using an ice pack or a bag of frozen veggies works very well. Ice for 20 minutes multiple times per day as needed.  Do not place the ice directly on the skin as this can cause tissue damage.    C - Compression - using a compression wrap or an ACE wrap can help to decrease swelling, which can help to decrease pain. Wearing the wraps is generally not needed at night, but they should be worn on a regular basis when you are going to be on your feet for prolonged periods as gravity tends to pull fluids down to your feet/ankles.    E - Elevation - elevating your lower extremities multiple times daily for 15-20 minutes can help to decrease swelling, which works well in decreasing pain levels.    NSAID/Tylenol - Anti-inflammatories like Aleve or ibuprofen, and/or a pain medication, such as Tylenol, can help to improve pain levels and get the issue resolved sooner rather than later. Anyone with liver issues should be careful with Tylenol, and anyone with high blood pressure or heart, stomach or kidney issues should be careful with anti-inflammatories. Please ask if you have questions about these medications, including dosage.    OVER THE COUNTER INSERTS    SuperFeet   Sofsole Fit Spenco   Power Step   Walk-Fit Arch Cradles     Most of these can be found at your local [employer], [employer], [employer], or  online:    1.  https://www.[employer] Squared.com/en-us/  2.  Https://[employer].Apperian/  3.  Https://www.powersteps.com/    **A good high quality over the counter insert should cost around $40-$50              TENDONITIS   Tendons are the strong fibrous portions ofmuscles that attach to bones and allow the muscle to move a joint when it contracts. Tendons are very strong because they have a lot of force exerted on them. Sometimes tendons can become painful because they have suffered an acute injury, in which too much force was exerted at one time, or an overuse injury, in which a normal force was exerted too frequently or over a prolonged period of time. As a result, there is damage to the tendon and its surrounding soft tissue structures and they become inflammed. Because tendons do not have a great blood supply, they do not heal rapidly and the inflammation can become chronic.   Conservative treatment for tendinitis involves rest and anti-inflammatory measures. Ice is applied 15 minutes 2-3 times daily. Anti-inflammatory medications called NSAIDs (ibuprofen, example) can be taken provided they are used with caution, as they can lead to internal bleeding and increase the risk ofstroke and heart attack. Sometimes topical nitroglycerin is prescribed to help with pain. Often your doctor will use a special shoe or removable walking cast to immobilize the tendon, allowing it to heal without further damage from use. These devices are very useful in helping tendons heal, but they may slow you down or make you feel like your hip, knee, or back are out ofalignment. This is temporary and should go away once you are out ofthe immobilization. You should not use a walking cast when showering or driving. Another option is Platelet Rich Plasma injections. (Normally done with a Sports and Orthorapedic doctor.   If conservative measures fail, your physician may need to surgically repair the tendon by removing any chronic inflammatory tissue and  sewing it back together. Sometimes it is sewn to an adjacent tendon with similar function for support and sometimes it is lengthened. . Sometimes the bones around the tendon need to be realigned or reshaped to better support the tendon or prevent further damage. Your foot and ankle surgeon will discuss the specifics of your surgery with you, should you need it.    Towel stretch: Sit on a hard surface with your injured leg stretched out in front of you. Loop a towel around your toes and the ball of your foot and pull the towel toward your body keeping your leg straight. Hold this position for 15 to 30 seconds and then relax. Repeat 3 times. Then push the towel away with the ball of your foot. Repeat 3 times.  When you don't feel much of a stretch using the towel, you can start the standing calf stretch and the following exercises.  Standing calf stretch: Stand facing a wall with your hands on the wall at about eye level. Keep your injured leg back with your heel on the floor. Keep the other leg forward with the knee bent. Turn your back foot slightly inward (as if you were pigeon-toed). Slowly lean into the wall until you feel a stretch in the back of your calf. Hold the stretch for 15 to 30 seconds. Return to the starting position. Repeat 3 times. Do this exercise several times each day.   Standing soleus stretch: Stand facing a wall with your hands on the wall at about chest height. Keep your injured leg back with your heel on the floor. Keep the other leg forward with the knee bent. Turn your back foot slightly inward (as if you were pigeon-toed). Bend your back knee slightly and gently lean into the wall until you feel a stretch in the lower calf of your injured leg. Hold the stretch for 15 to 30 seconds. Return to the starting position. Repeat 3 times.   Achilles stretch: Stand with the ball of one foot on a stair. Reach for the step below with your heel until you feel a stretch in the arch of your foot. Hold  this position for 15 to 30 seconds and then relax. Repeat 3 times.   Heel raise: Balance yourself while standing behind a chair or counter. Using the chair or counter as a support to help you, raise your body up onto your toes and hold for 5 seconds. Then slowly lower yourself down without holding onto the support. (It's OK to keep holding onto the support if you need to.) When this exercise becomes less painful, try lowering yourself down on the injured leg only. Repeat 15 times. Do 2 sets of 15. Rest 30 seconds between sets.   Step-up: Stand with the foot of your injured leg on a support 3 to 5 inches high (like a small step or block of wood). Keep your other foot flat on the floor. Shift your weight onto the injured leg on the support. Straighten your injured leg as the other leg comes off the floor. Return to the starting position by bending your injured leg and slowly lowering your uninjured leg back to the floor. Do 2 sets of 15.   Resisted ankle eversion: Sit with both legs stretched out in front of you, with your feet about a shoulder's width apart. Tie a loop in one end of elastic tubing. Put the foot of your injured leg through the loop so that the tubing goes around the arch of that foot and wraps around the outside of the other foot. Hold onto the other end of the tubing with your hand to provide tension. Turn the foot of your injured leg up and out. Make sure you keep your other foot still so that it will allow the tubing to stretch as you move the foot of your injured leg. Return to the starting position. Do 2 sets of 15.   Balance and reach exercises: Stand next to a chair with your injured leg farther from the chair. The chair will provide support if you need it. Stand on the foot of your injured leg and bend your knee slightly. Try to raise the arch of this foot while keeping your big toe on the floor. Keep your foot in this position. With the hand that is farther away from the chair, reach forward  in front of you by bending at the waist. Avoid bending your knee any more as you do this. Repeat this 10 times. To make the exercise more challenging, reach farther in front of you. Do 2 sets of 10.  the same position as above. While keeping your arch height, reach the hand that is farther away from the chair across your body toward the chair. The farther you reach, the more challenging the exercise. Do 2 sets of 10.     Resisted ankle eversion: Sit with both legs stretched out in front of you, with your feet about a shoulder's width apart. Tie a loop in one end of elastic tubing. Put the foot of your injured leg through the loop so that the tubing goes around the arch of that foot and wraps around the outside of the other foot. Hold onto the other end of the tubing with your hand to provide tension. Turn the foot of your injured leg up and out. Make sure you keep your other foot still so that it will allow the tubing to stretch as you move the foot of your injured leg. Return to the starting position. Do 2 sets of 15.   If you have access to a wobble board, do the following exercises:  Wobble board exercises:   Stand on a wobble board with your feet shoulder width apart. Rock the board forwards and backwards 30 times, then side to side 30 times. Hold on to a chair if you need support.   Rotate the wobble board around so that the edge of the board is in contact with the floor at all times. Do this 30 times in a clockwise and then a counterclockwise direction.   Balance on the wobble board for as long as you can without letting the edges touch the floor. Try to do this for 2 minutes without touching the floor.   Rotate the wobble board in clockwise and counterclockwise circles, but do not let the edge of the board touch the floor.   When you have mastered exercises A through D, try repeating them while standing on just your injured leg.   After you are able to do these exercises on one leg, try to do them  with your eyes closed. Make sure you have something nearby to support you in case you lose your balance.

## 2019-01-31 NOTE — LETTER
"    1/31/2019         RE: Gabe Moon  [address]  [facility] [address]        Dear Colleague,    Thank you for referring your patient, Gabe Moon, to the [facility]. Please see a copy of my visit note below.    Foot & Ankle Surgery  January 31, 2019    CC: \"sore right foot\"    I was asked to see Gabe Moon regarding the chief complaint by:  Dr. Edmondson     HPI:  Pt is a 58 year old male who presents with above complaint.  Right foot pain x 2 months.  \"sore to weight and pressure on right edge of foot. Sore burning\".  Insidious onset, pain towards 5th met base.  Worse with increased activities.  Denies redness/swelling.  Pain 4/10 \"intermittent\", worse with walking on treadmill, hiking.  \"Ignored it\" for treatment.  No imaging    ROS:   Pos for CC.  The patient denies current nausea, vomiting, chills, fevers, belly pain, calf pain, chest pain or SOB.  Complete remainder of ROS is otherwise neg.    VITALS:    Vitals:    01/31/19 1559   BP: 118/68   Weight: 87.1 kg (192 lb)   Height: 1.892 m (6' 2.5\")       PMH:    Past Medical History:   Diagnosis Date     Low back pain      Varicose vein of leg        SXHX:    Past Surgical History:   Procedure Laterality Date     HERNIA REPAIR  2003    inguinal     LIGATE VEIN      '93 scrotum, '14 left leg        MEDS:    Current Outpatient Medications   Medication     cholecalciferol (VITAMIN D) 1000 UNIT tablet     multivitamin, therapeutic with minerals (MULTI-VITAMIN) TABS tablet     omega 3 1000 MG CAPS     No current facility-administered medications for this visit.        ALL:   No Known Allergies    FMH:    Family History   Problem Relation Age of Onset     Breast Cancer Mother      Alzheimer Disease Mother      Depression Mother      Mental Illness Mother         bipolar     Heart Murmur Mother         not murmur, racing heart     Diabetes Father      Hypertension Father      Prostate Cancer Father      Osteoarthritis Father         hip " replacement (overwt)     Osteoarthritis Paternal Grandfather         hip replacements     Colon Cancer Maternal Cousin 53         of colon cancer at 58       SocHx:    Social History     Socioeconomic History     Marital status:      Spouse name: Not on file     Number of children: Not on file     Years of education: Not on file     Highest education level: Not on file   Social Needs     Financial resource strain: Not on file     Food insecurity - worry: Not on file     Food insecurity - inability: Not on file     Transportation needs - medical: Not on file     Transportation needs - non-medical: Not on file   Occupational History     Comment: office furniture sales, [employer]   Tobacco Use     Smoking status: Never Smoker     Smokeless tobacco: Never Used   Substance and Sexual Activity     Alcohol use: Yes     Alcohol/week: 0.0 oz     Drug use: No     Comment: once daily     Sexual activity: Yes     Partners: Female   Other Topics Concern     Parent/sibling w/ CABG, MI or angioplasty before 65F 55M? No   Social History Narrative     Not on file           EXAMINATION:  Gen:   No apparent distress  Neuro:   A&Ox3, no deficits  Psych:    Answering questions appropriately for age and situation with normal affect  Head:    NCAT  Eye:    Visual scanning without deficit  Ear:    Response to auditory stimuli wnl  Lung:    Non-labored breathing on RA noted  Abd:    NTND per patient report  Lymph:    Neg for pitting/non-pitting edema BLE  Vasc:    Pulses palpable, CFT minimally delayed  Neuro:    Light touch sensation intact to all sensory nerve distributions without paresthesias  Derm:    Neg for nodules, lesions or ulcerations  MSK:    R 5th metatarsal base and P.brevis insertion pain.  No pain with tendon firing.    Calf:    Neg for redness, swelling or tenderness      Assessment:  58 year old male with P.brevis insertion tendinopathy right lower extremity       Plan:  Discussed etiologies, anatomy and  options  1.  P.brevis insertion tendinopathy right lower extremity   -tendonitis handout for patient information  -RICE/NSAID vs tylenol prn based on pain  -OTC insert for rearfoot motion control and arch support  -comfortable shoe gear; minimize shoeless ambulation  -activity modifications based on pain levels  -consider walking boot, imaging    Follow up:  3 weeks or sooner with acute issues      Patient's medical history was reviewed today    Body mass index is 24.32 kg/m .          Rickie Mishra DPM FACFAS FACFAOM  Podiatric Foot & Ankle Surgeon  [facility]  [phone]        Again, thank you for allowing me to participate in the care of your patient.        Sincerely,        Rickie Mishra DPM, DPJOSE ALBERTO

## 2019-02-04 NOTE — PROGRESS NOTES
"Foot & Ankle Surgery  2019    CC: \"sore right foot\"    I was asked to see Gabe Moon regarding the chief complaint by:  Dr. Edmondson     HPI:  Pt is a 58 year old male who presents with above complaint.  Right foot pain x 2 months.  \"sore to weight and pressure on right edge of foot. Sore burning\".  Insidious onset, pain towards 5th met base.  Worse with increased activities.  Denies redness/swelling.  Pain /10 \"intermittent\", worse with walking on treadmill, hiking.  \"Ignored it\" for treatment.  No imaging    ROS:   Pos for CC.  The patient denies current nausea, vomiting, chills, fevers, belly pain, calf pain, chest pain or SOB.  Complete remainder of ROS is otherwise neg.    VITALS:    Vitals:    19 1559   BP: 118/68   Weight: 87.1 kg (192 lb)   Height: 1.892 m (6' 2.5\")       PMH:    Past Medical History:   Diagnosis Date     Low back pain      Varicose vein of leg        SXHX:    Past Surgical History:   Procedure Laterality Date     HERNIA REPAIR      inguinal     LIGATE VEIN      '93 scrotum, '14 left leg        MEDS:    Current Outpatient Medications   Medication     cholecalciferol (VITAMIN D) 1000 UNIT tablet     multivitamin, therapeutic with minerals (MULTI-VITAMIN) TABS tablet     omega 3 1000 MG CAPS     No current facility-administered medications for this visit.        ALL:   No Known Allergies    FMH:    Family History   Problem Relation Age of Onset     Breast Cancer Mother      Alzheimer Disease Mother      Depression Mother      Mental Illness Mother         bipolar     Heart Murmur Mother         not murmur, racing heart     Diabetes Father      Hypertension Father      Prostate Cancer Father      Osteoarthritis Father         hip replacement (overwt)     Osteoarthritis Paternal Grandfather         hip replacements     Colon Cancer Maternal Cousin 53         of colon cancer at 58       SocHx:    Social History     Socioeconomic History     Marital status:  "     Spouse name: Not on file     Number of children: Not on file     Years of education: Not on file     Highest education level: Not on file   Social Needs     Financial resource strain: Not on file     Food insecurity - worry: Not on file     Food insecurity - inability: Not on file     Transportation needs - medical: Not on file     Transportation needs - non-medical: Not on file   Occupational History     Comment: office furniture sales, [employer]   Tobacco Use     Smoking status: Never Smoker     Smokeless tobacco: Never Used   Substance and Sexual Activity     Alcohol use: Yes     Alcohol/week: 0.0 oz     Drug use: No     Comment: once daily     Sexual activity: Yes     Partners: Female   Other Topics Concern     Parent/sibling w/ CABG, MI or angioplasty before 65F 55M? No   Social History Narrative     Not on file           EXAMINATION:  Gen:   No apparent distress  Neuro:   A&Ox3, no deficits  Psych:    Answering questions appropriately for age and situation with normal affect  Head:    NCAT  Eye:    Visual scanning without deficit  Ear:    Response to auditory stimuli wnl  Lung:    Non-labored breathing on RA noted  Abd:    NTND per patient report  Lymph:    Neg for pitting/non-pitting edema BLE  Vasc:    Pulses palpable, CFT minimally delayed  Neuro:    Light touch sensation intact to all sensory nerve distributions without paresthesias  Derm:    Neg for nodules, lesions or ulcerations  MSK:    R 5th metatarsal base and P.brevis insertion pain.  No pain with tendon firing.    Calf:    Neg for redness, swelling or tenderness      Assessment:  58 year old male with P.brevis insertion tendinopathy right lower extremity       Plan:  Discussed etiologies, anatomy and options  1.  P.brevis insertion tendinopathy right lower extremity   -tendonitis handout for patient information  -RICE/NSAID vs tylenol prn based on pain  -OTC insert for rearfoot motion control and arch support  -comfortable shoe gear;  minimize shoeless ambulation  -activity modifications based on pain levels  -consider walking boot, imaging    Follow up:  3 weeks or sooner with acute issues      Patient's medical history was reviewed today    Body mass index is 24.32 kg/m .          Rickie Mishra DPM FACFAS FACFAOM  Podiatric Foot & Ankle Surgeon  [facility]  [phone]

## 2019-03-12 ENCOUNTER — TELEPHONE (OUTPATIENT)
Dept: FAMILY MEDICINE | Facility: CLINIC | Age: 59
End: 2019-03-12

## 2019-03-12 DIAGNOSIS — M25.562 PAIN IN BOTH KNEES, UNSPECIFIED CHRONICITY: Primary | ICD-10-CM

## 2019-03-12 DIAGNOSIS — M25.561 PAIN IN BOTH KNEES, UNSPECIFIED CHRONICITY: Primary | ICD-10-CM

## 2019-03-12 NOTE — TELEPHONE ENCOUNTER
CW  Patient called.  Asking for PT referral.  Note from 1/25/19 physical:  Bilateral knee pain- with stairs and squats. Rec finding non-irritating work-out options other than squats to avoid aggravating it further.  Will work with his .  Call for PT referral if not improving.    Order T'd up.  Thanks,  Sonia Blake RN

## 2019-03-12 NOTE — TELEPHONE ENCOUNTER
Reason for Call: Request for an order or referral:    Order or referral being requested:   Pt called and would like a referral to see PT    Date needed: as soon as possible    Has the patient been seen by the PCP for this problem? YES    Additional comments: Pt mentioned possibly seeing one to Dr. Sorensen at last physical     Phone number Patient can be reached at:  Cell number on file:    Telephone Information:   Mobile [phone]     Best Time:  Any    Can we leave a detailed message on this number?  YES    Call taken on 3/12/2019 at 9:36 AM by Adriana Jo

## 2019-03-13 ENCOUNTER — OFFICE VISIT (OUTPATIENT)
Dept: FAMILY MEDICINE | Facility: CLINIC | Age: 59
End: 2019-03-13
Payer: COMMERCIAL

## 2019-03-13 VITALS
BODY MASS INDEX: 24.57 KG/M2 | HEART RATE: 72 BPM | SYSTOLIC BLOOD PRESSURE: 116 MMHG | WEIGHT: 194 LBS | RESPIRATION RATE: 12 BRPM | OXYGEN SATURATION: 97 % | DIASTOLIC BLOOD PRESSURE: 72 MMHG | TEMPERATURE: 97.7 F

## 2019-03-13 DIAGNOSIS — Z22.39 CARRIER OF UREAPLASMA UREALYTICUM: ICD-10-CM

## 2019-03-13 DIAGNOSIS — Z11.3 SCREEN FOR STD (SEXUALLY TRANSMITTED DISEASE): ICD-10-CM

## 2019-03-13 DIAGNOSIS — R30.0 DYSURIA: Primary | ICD-10-CM

## 2019-03-13 LAB
ALBUMIN UR-MCNC: NEGATIVE MG/DL
APPEARANCE UR: CLEAR
BILIRUB UR QL STRIP: NEGATIVE
COLOR UR AUTO: YELLOW
GLUCOSE UR STRIP-MCNC: NEGATIVE MG/DL
HGB UR QL STRIP: NEGATIVE
KETONES UR STRIP-MCNC: NEGATIVE MG/DL
LEUKOCYTE ESTERASE UR QL STRIP: NEGATIVE
NITRATE UR QL: NEGATIVE
PH UR STRIP: 6 PH (ref 5–7)
SOURCE: NORMAL
SP GR UR STRIP: 1.02 (ref 1–1.03)
UROBILINOGEN UR STRIP-ACNC: 0.2 EU/DL (ref 0.2–1)

## 2019-03-13 PROCEDURE — 81003 URINALYSIS AUTO W/O SCOPE: CPT | Performed by: FAMILY MEDICINE

## 2019-03-13 PROCEDURE — 87491 CHLMYD TRACH DNA AMP PROBE: CPT | Performed by: FAMILY MEDICINE

## 2019-03-13 PROCEDURE — 87591 N.GONORRHOEAE DNA AMP PROB: CPT | Performed by: FAMILY MEDICINE

## 2019-03-13 PROCEDURE — 87086 URINE CULTURE/COLONY COUNT: CPT | Performed by: FAMILY MEDICINE

## 2019-03-13 PROCEDURE — 99213 OFFICE O/P EST LOW 20 MIN: CPT | Performed by: FAMILY MEDICINE

## 2019-03-13 NOTE — NURSING NOTE
"Chief Complaint   Patient presents with     UTI     /72 (BP Location: Right arm, Patient Position: Sitting, Cuff Size: Adult Large)   Pulse 72   Temp 97.7  F (36.5  C) (Oral)   Resp 12   Wt 88 kg (194 lb)   SpO2 97%   BMI 24.57 kg/m   Estimated body mass index is 24.57 kg/m  as calculated from the following:    Height as of 1/31/19: 1.892 m (6' 2.5\").    Weight as of this encounter: 88 kg (194 lb).  bp completed using cuff size: large       Health Maintenance addressed:  NONE    n/a    Raji Dennis MA     "

## 2019-03-13 NOTE — PROGRESS NOTES
SUBJECTIVE:   Gabe Moon is a 59 year old male who presents to clinic today for the following health issues:      Genitourinary symptoms    Duration: patient states that he has no issues but his partner has been having UTI's; therefore her urologist wanted him to get tested.     Description:  None     Intensity:  0/10    Accompanying signs and symptoms (fever/discharge/nausea/vomiting/back or abdominal pain):  None    History (frequent UTI's/kidney stones/prostate problems): None  Sexually active: YES    Precipitating or alleviating factors: None    Therapies tried and outcome: NONE     Partner has UTIs, recurrent, thought to be from ureaplasma.  She is currently on doxycycline.  Urologist wanted him to come in to be tested for it, and treated if positive.      Problem list and histories reviewed & adjusted, as indicated.  Additional history: as documented      Patient Active Problem List   Diagnosis     Low back pain     Past Surgical History:   Procedure Laterality Date     HERNIA REPAIR      inguinal     LIGATE VEIN      ' scrotum, '14 left leg       Social History     Tobacco Use     Smoking status: Never Smoker     Smokeless tobacco: Never Used   Substance Use Topics     Alcohol use: Yes     Alcohol/week: 0.0 oz     Family History   Problem Relation Age of Onset     Breast Cancer Mother      Alzheimer Disease Mother      Depression Mother      Mental Illness Mother         bipolar     Heart Murmur Mother         not murmur, racing heart     Diabetes Father      Hypertension Father      Prostate Cancer Father      Osteoarthritis Father         hip replacement (overwt)     Osteoarthritis Paternal Grandfather         hip replacements     Colon Cancer Maternal Cousin 53         of colon cancer at 58         Current Outpatient Medications   Medication Sig Dispense Refill     cholecalciferol (VITAMIN D) 1000 UNIT tablet Take 1 tablet (1,000 Units) by mouth daily 100 tablet 3     multivitamin,  therapeutic with minerals (MULTI-VITAMIN) TABS tablet Take 1 tablet by mouth daily 100 tablet 0     omega 3 1000 MG CAPS Take 1 g by mouth daily 90 capsule      Labs reviewed in EPIC    Reviewed and updated as needed this visit by clinical staff  Tobacco  Allergies  Meds       Reviewed and updated as needed this visit by Provider         ROS:  Constitutional, HEENT, cardiovascular, pulmonary, gi and gu systems are negative, except as otherwise noted.    OBJECTIVE:     /72 (BP Location: Right arm, Patient Position: Sitting, Cuff Size: Adult Large)   Pulse 72   Temp 97.7  F (36.5  C) (Oral)   Resp 12   Wt 88 kg (194 lb)   SpO2 97%   BMI 24.57 kg/m    Body mass index is 24.57 kg/m .  GENERAL APPEARANCE: healthy, alert and no distress     EYES: sclera clear, EOMI     RESP: lungs clear to auscultation - no rales, rhonchi or wheezes     CV: regular rates and rhythm, normal S1 S2, no S3 or S4 and no murmur, click or rub      Ext: warm, dry, no edema       : normal male genitalia, no erythema or discharge    Diagnostic Test Results:  Results for orders placed or performed in visit on 03/13/19   *UA reflex to Microscopic and Culture ([facility] (except [place] and [place])   Result Value Ref Range    Color Urine Yellow     Appearance Urine Clear     Glucose Urine Negative NEG^Negative mg/dL    Bilirubin Urine Negative NEG^Negative    Ketones Urine Negative NEG^Negative mg/dL    Specific Gravity Urine 1.020 1.003 - 1.035    Blood Urine Negative NEG^Negative    pH Urine 6.0 5.0 - 7.0 pH    Protein Albumin Urine Negative NEG^Negative mg/dL    Urobilinogen Urine 0.2 0.2 - 1.0 EU/dL    Nitrite Urine Negative NEG^Negative    Leukocyte Esterase Urine Negative NEG^Negative    Source Midstream Urine        ASSESSMENT/PLAN:       ICD-10-CM    1. Dysuria R30.0 *UA reflex to Microscopic and Culture ([place] [facility] (except [place] and [place])   2. rule out Carrier of ureaplasma  urealyticum Z22.39 Urine Culture Aerobic Bacterial   3. Screen for STD (sexually transmitted disease) Z11.3 NEISSERIA GONORRHOEA PCR     CHLAMYDIA TRACHOMATIS PCR     Partner with recurrent UTIs, positive for ureaplasma.  Her urologist rec he be tested for it, and treated if positive.  UA negative, but will send UCx to check, and will treat if positive.  Will also check gc/chlam today.  No sx's.    Pt about to start PT for persistent bilateral knee pain- see TE from yesterday and office note from 1/25/19.    Beatriz Edmondson MD  [facility] [place]

## 2019-03-14 LAB
BACTERIA SPEC CULT: NO GROWTH
C TRACH DNA SPEC QL NAA+PROBE: NEGATIVE
N GONORRHOEA DNA SPEC QL NAA+PROBE: NEGATIVE
SPECIMEN SOURCE: NORMAL

## 2019-03-18 NOTE — RESULT ENCOUNTER NOTE
Here are your lab results from your recent visit...  -The urine culture was also negative for infection.  -Your gonorrhea and chlamydia tests were also negative.    Please let me know if you have any questions.  Best,   Graeme Edmondson MD

## 2019-03-20 ENCOUNTER — THERAPY VISIT (OUTPATIENT)
Dept: PHYSICAL THERAPY | Facility: CLINIC | Age: 59
End: 2019-03-20
Payer: COMMERCIAL

## 2019-03-20 DIAGNOSIS — M25.562 PAIN IN BOTH KNEES, UNSPECIFIED CHRONICITY: ICD-10-CM

## 2019-03-20 DIAGNOSIS — M25.561 PAIN IN BOTH KNEES, UNSPECIFIED CHRONICITY: ICD-10-CM

## 2019-03-20 PROCEDURE — 97110 THERAPEUTIC EXERCISES: CPT | Mod: GP | Performed by: PHYSICAL THERAPIST

## 2019-03-20 PROCEDURE — 97112 NEUROMUSCULAR REEDUCATION: CPT | Mod: GP | Performed by: PHYSICAL THERAPIST

## 2019-03-20 PROCEDURE — 97161 PT EVAL LOW COMPLEX 20 MIN: CPT | Mod: GP | Performed by: PHYSICAL THERAPIST

## 2019-03-20 ASSESSMENT — ACTIVITIES OF DAILY LIVING (ADL)
RISE FROM A CHAIR: ACTIVITY IS MINIMALLY DIFFICULT
WALK: ACTIVITY IS NOT DIFFICULT
HOW_WOULD_YOU_RATE_THE_CURRENT_FUNCTION_OF_YOUR_KNEE_DURING_YOUR_USUAL_DAILY_ACTIVITIES_ON_A_SCALE_FROM_0_TO_100_WITH_100_BEING_YOUR_LEVEL_OF_KNEE_FUNCTION_PRIOR_TO_YOUR_INJURY_AND_0_BEING_THE_INABILITY_TO_PERFORM_ANY_OF_YOUR_USUAL_DAILY_ACTIVITIES?: 90
SQUAT: ACTIVITY IS FAIRLY DIFFICULT
GO UP STAIRS: ACTIVITY IS MINIMALLY DIFFICULT
STIFFNESS: THE SYMPTOM AFFECTS MY ACTIVITY SLIGHTLY
HOW_WOULD_YOU_RATE_THE_OVERALL_FUNCTION_OF_YOUR_KNEE_DURING_YOUR_USUAL_DAILY_ACTIVITIES?: NEARLY NORMAL
SWELLING: I DO NOT HAVE THE SYMPTOM
SIT WITH YOUR KNEE BENT: ACTIVITY IS NOT DIFFICULT
STAND: ACTIVITY IS NOT DIFFICULT
LIMPING: I HAVE THE SYMPTOM BUT IT DOES NOT AFFECT MY ACTIVITY
PAIN: THE SYMPTOM AFFECTS MY ACTIVITY SLIGHTLY
KNEE_ACTIVITY_OF_DAILY_LIVING_SUM: 56
AS_A_RESULT_OF_YOUR_KNEE_INJURY,_HOW_WOULD_YOU_RATE_YOUR_CURRENT_LEVEL_OF_DAILY_ACTIVITY?: NEARLY NORMAL
RAW_SCORE: 56
GIVING WAY, BUCKLING OR SHIFTING OF KNEE: I DO NOT HAVE THE SYMPTOM
GO DOWN STAIRS: ACTIVITY IS MINIMALLY DIFFICULT
KNEEL ON THE FRONT OF YOUR KNEE: ACTIVITY IS SOMEWHAT DIFFICULT
WEAKNESS: I HAVE THE SYMPTOM BUT IT DOES NOT AFFECT MY ACTIVITY
KNEE_ACTIVITY_OF_DAILY_LIVING_SCORE: 80

## 2019-03-20 NOTE — PROGRESS NOTES
Physical Therapy Initial Evaluation    Precautions/Restrictions/MD instructions: PT eval and treat.       Subjective History:    Injury/Condition Details:  Presenting Complaint Gabe presents with B) knee pain, worsening since Christmas. Pain is worst while working out, specifically with doing squats. Noticed a little pain with cycling, not able to run because of knee pain. Doesn't have much pain at rest, but does get achy after working out. Did leg presses and squats on Monday, was quite sore after that. Does some knee activation exercises to warm-up, which initially helped a lot but more recently feels like this is no longer reducing symptoms.   Onset Timing/Date MD referral 3/12/19   Mechanism Not sure - possibly squatting      Symptom Behavior Details   Primary Pain Symptoms Location: anterior knee quad tendo region R>L  Quality: achy, sharp   Frequency: intermittent, becoming more constant   Worst Pain 8/10   Best Pain 3/10   Symptom Provocators Squatting, running, stairs, lunges, squatting to tie shoes   Symptom Relievers Anti-inflammatory meds, rest   Time of day dependent? no   Symptom change since onset? worsening      Prior Testing/Intervention for current condition:  Prior Tests none   Prior Treatment none      Lifestyle & General Medical History  General Health Reported by Patient excellent   Employment/Activities Works in sales   Pertinent medical/surgical history Hx of LBP   Patient Goals Return to squatting     KNEE:    PROM: WNL & painless B)    Strength:   L R   HIP     Flex 5/5 5/5   Ext 4+/5 5/5   Abd 4/5 4+/5   KNEE     Flex 5/5 5/5   Ext 5/5 5/5     Hip PROM:     L R   IR WNL WNL   ER WNL WNL   Jose WNL WNL       Special tests:   L R   Valgus 0 degrees - -   Valgus 30 degrees - -   Varus 0 degrees - -   Varus 30 degrees - -   Lateral Compression - -   Patellar Compression - -     Palpation: no tenderness noted    Functional: increased valgus L>R with SL squat, reduced depth due to pain; SL  stance ~15 sec on L) with increased sway, >20 sec on R)    Gait: normal    Patient is a 59 year old male with both sides knee complaints.    Patient has the following significant findings with corresponding treatment plan.                Diagnosis 1:  B) knee pain  Pain -  manual therapy, self management, education and home program  Decreased strength - therapeutic exercise and therapeutic activities  Impaired balance - neuro re-education and therapeutic activities  Decreased proprioception - neuro re-education and therapeutic activities  Impaired muscle performance - neuro re-education  Decreased function - therapeutic activities    Therapy Evaluation Codes:   1) History comprised of:   Personal factors that impact the plan of care:      None.    Comorbidity factors that impact the plan of care are:      None.     Medications impacting care: None.  2) Examination of Body Systems comprised of:   Body structures and functions that impact the plan of care:      Knee.   Activity limitations that impact the plan of care are:      Bending, Dressing, Sports, Squatting/kneeling and Stairs.  3) Clinical presentation characteristics are:   Stable/Uncomplicated.  4) Decision-Making    Low complexity using standardized patient assessment instrument and/or measureable assessment of functional outcome.  Cumulative Therapy Evaluation is: Low complexity.    Previous and current functional limitations:  (See Goal Flow Sheet for this information)    Short term and Long term goals: (See Goal Flow Sheet for this information)     Communication ability:  Patient appears to be able to clearly communicate and understand verbal and written communication and follow directions correctly.  Treatment Explanation - The following has been discussed with the patient:   RX ordered/plan of care  Anticipated outcomes  Possible risks and side effects  This patient would benefit from PT intervention to resume normal activities.   Rehab potential is  good.    Frequency:  1 X week, once daily  Duration:  for 8 weeks  Discharge Plan:  Achieve all LTG.  Independent in home treatment program.  Reach maximal therapeutic benefit.    Please refer to the daily flowsheet for treatment today, total treatment time and time spent performing 1:1 timed codes.

## 2019-03-20 NOTE — PROGRESS NOTES
[facility] Initial Evaluation  Subjective:                                       Past medical history: Some low back chronic pain.    Other surgeries include:  Other (Hernia).      Employment status: Sales.  Primary job tasks include:  Driving and prolonged sitting (computer work).                                Objective:  System    Physical Exam    General     ROS    Assessment/Plan:

## 2019-03-29 ENCOUNTER — THERAPY VISIT (OUTPATIENT)
Dept: PHYSICAL THERAPY | Facility: CLINIC | Age: 59
End: 2019-03-29
Payer: COMMERCIAL

## 2019-03-29 DIAGNOSIS — M25.561 PAIN IN BOTH KNEES, UNSPECIFIED CHRONICITY: ICD-10-CM

## 2019-03-29 DIAGNOSIS — M25.562 PAIN IN BOTH KNEES, UNSPECIFIED CHRONICITY: ICD-10-CM

## 2019-03-29 PROCEDURE — 97110 THERAPEUTIC EXERCISES: CPT | Mod: GP | Performed by: PHYSICAL THERAPIST

## 2019-03-29 PROCEDURE — 97112 NEUROMUSCULAR REEDUCATION: CPT | Mod: GP | Performed by: PHYSICAL THERAPIST

## 2019-04-12 ENCOUNTER — THERAPY VISIT (OUTPATIENT)
Dept: PHYSICAL THERAPY | Facility: CLINIC | Age: 59
End: 2019-04-12
Payer: COMMERCIAL

## 2019-04-12 DIAGNOSIS — M25.562 PAIN IN BOTH KNEES, UNSPECIFIED CHRONICITY: ICD-10-CM

## 2019-04-12 DIAGNOSIS — M25.561 PAIN IN BOTH KNEES, UNSPECIFIED CHRONICITY: ICD-10-CM

## 2019-04-12 PROCEDURE — 97110 THERAPEUTIC EXERCISES: CPT | Mod: GP | Performed by: PHYSICAL THERAPIST

## 2019-04-12 PROCEDURE — 97112 NEUROMUSCULAR REEDUCATION: CPT | Mod: GP | Performed by: PHYSICAL THERAPIST

## 2019-05-10 ENCOUNTER — THERAPY VISIT (OUTPATIENT)
Dept: PHYSICAL THERAPY | Facility: CLINIC | Age: 59
End: 2019-05-10
Payer: COMMERCIAL

## 2019-05-10 DIAGNOSIS — M25.562 PAIN IN BOTH KNEES, UNSPECIFIED CHRONICITY: ICD-10-CM

## 2019-05-10 DIAGNOSIS — M25.561 PAIN IN BOTH KNEES, UNSPECIFIED CHRONICITY: ICD-10-CM

## 2019-05-10 PROCEDURE — 97110 THERAPEUTIC EXERCISES: CPT | Mod: GP | Performed by: PHYSICAL THERAPIST

## 2019-05-10 PROCEDURE — 97112 NEUROMUSCULAR REEDUCATION: CPT | Mod: GP | Performed by: PHYSICAL THERAPIST

## 2019-06-20 ENCOUNTER — THERAPY VISIT (OUTPATIENT)
Dept: PHYSICAL THERAPY | Facility: CLINIC | Age: 59
End: 2019-06-20
Payer: COMMERCIAL

## 2019-06-20 DIAGNOSIS — M25.561 PAIN IN BOTH KNEES, UNSPECIFIED CHRONICITY: ICD-10-CM

## 2019-06-20 DIAGNOSIS — M25.562 PAIN IN BOTH KNEES, UNSPECIFIED CHRONICITY: ICD-10-CM

## 2019-06-20 PROCEDURE — 97112 NEUROMUSCULAR REEDUCATION: CPT | Mod: GP | Performed by: PHYSICAL THERAPIST

## 2019-06-20 PROCEDURE — 97110 THERAPEUTIC EXERCISES: CPT | Mod: GP | Performed by: PHYSICAL THERAPIST

## 2019-06-20 PROCEDURE — 97530 THERAPEUTIC ACTIVITIES: CPT | Mod: GP | Performed by: PHYSICAL THERAPIST

## 2019-07-11 ENCOUNTER — THERAPY VISIT (OUTPATIENT)
Dept: PHYSICAL THERAPY | Facility: CLINIC | Age: 59
End: 2019-07-11
Payer: COMMERCIAL

## 2019-07-11 DIAGNOSIS — M25.562 PAIN IN BOTH KNEES, UNSPECIFIED CHRONICITY: ICD-10-CM

## 2019-07-11 DIAGNOSIS — M25.561 PAIN IN BOTH KNEES, UNSPECIFIED CHRONICITY: ICD-10-CM

## 2019-07-11 PROCEDURE — 97110 THERAPEUTIC EXERCISES: CPT | Mod: GP | Performed by: PHYSICAL THERAPIST

## 2019-07-11 PROCEDURE — 97530 THERAPEUTIC ACTIVITIES: CPT | Mod: GP | Performed by: PHYSICAL THERAPIST

## 2019-07-11 PROCEDURE — 97112 NEUROMUSCULAR REEDUCATION: CPT | Mod: GP | Performed by: PHYSICAL THERAPIST

## 2019-08-13 ENCOUNTER — THERAPY VISIT (OUTPATIENT)
Dept: PHYSICAL THERAPY | Facility: CLINIC | Age: 59
End: 2019-08-13
Payer: COMMERCIAL

## 2019-08-13 DIAGNOSIS — M25.562 PAIN IN BOTH KNEES, UNSPECIFIED CHRONICITY: ICD-10-CM

## 2019-08-13 DIAGNOSIS — M25.561 PAIN IN BOTH KNEES, UNSPECIFIED CHRONICITY: ICD-10-CM

## 2019-08-13 PROBLEM — M54.50 LOW BACK PAIN: Status: RESOLVED | Noted: 2017-12-18 | Resolved: 2019-08-13

## 2019-08-13 PROCEDURE — 97110 THERAPEUTIC EXERCISES: CPT | Mod: GP | Performed by: PHYSICAL THERAPIST

## 2019-08-13 PROCEDURE — 97530 THERAPEUTIC ACTIVITIES: CPT | Mod: GP | Performed by: PHYSICAL THERAPIST

## 2019-10-24 PROBLEM — M25.561 PAIN IN BOTH KNEES, UNSPECIFIED CHRONICITY: Status: RESOLVED | Noted: 2019-03-20 | Resolved: 2019-10-24

## 2019-10-24 PROBLEM — M25.562 PAIN IN BOTH KNEES, UNSPECIFIED CHRONICITY: Status: RESOLVED | Noted: 2019-03-20 | Resolved: 2019-10-24

## 2019-10-24 NOTE — PROGRESS NOTES
Subjective:  HPI                    Objective:  System    Physical Exam    General     ROS    Assessment/Plan:    DISCHARGE REPORT    Progress reporting period is from 5/17/19 to 8/13/19.       SUBJECTIVE  Subjective changes noted by patient:  Last visit was doing much better, but not fully fucntional due to knee pain. Taping very helpful  Subjective: 20' tardy due to weather. Able to hike w/o pain as long as taped    Current pain level is NA  .     Previous pain level was  NA Initial Pain level: 8/10.   Changes in function:  Yes (See Goal flowsheet attached for changes in current functional level)  Adverse reaction to treatment or activity: None    OBJECTIVE  Changes noted in objective findings:  Patient has failed to return to therapy so current objective findings are unknown. and The objective findings below are from DOS 8/13/19.  Objective: nedds to be taped to complete pain free squat and or lunge L.  Independent HEP     ASSESSMENT/PLAN  Updated problem list and treatment plan: Diagnosis 1:  Knee pain  Pain -  splint/taping/bracing/orthotics, self management, education and home program  Decreased strength - therapeutic exercise and therapeutic activities  Decreased function - therapeutic activities  STG/LTGs have been met or progress has been made towards goals:  Yes (See Goal flow sheet completed today.)  Assessment of Progress: The patient's condition is improving.  The patient's condition has potential to improve.  The patient has not returned to therapy. Current status is unknown.  Self Management Plans:  Patient has been instructed in a home treatment program.    Gabe continues to require the following intervention to meet STG and LTG's:  PT intervention is no longer required to meet STG/LTG.    Recommendations:  This patient is ready to be discharged from therapy and continue their home treatment program.    Please refer to the daily flowsheet for treatment today, total treatment time and time spent  performing 1:1 timed codes.

## 2019-11-15 ENCOUNTER — OFFICE VISIT (OUTPATIENT)
Dept: FAMILY MEDICINE | Facility: CLINIC | Age: 59
End: 2019-11-15
Payer: COMMERCIAL

## 2019-11-15 VITALS
BODY MASS INDEX: 23.02 KG/M2 | OXYGEN SATURATION: 99 % | DIASTOLIC BLOOD PRESSURE: 86 MMHG | TEMPERATURE: 97.3 F | HEART RATE: 58 BPM | WEIGHT: 189 LBS | HEIGHT: 76 IN | SYSTOLIC BLOOD PRESSURE: 136 MMHG | RESPIRATION RATE: 16 BRPM

## 2019-11-15 DIAGNOSIS — N48.9 PENIS SYMPTOM OR SIGN: Primary | ICD-10-CM

## 2019-11-15 LAB
ALBUMIN UR-MCNC: NEGATIVE MG/DL
APPEARANCE UR: CLEAR
BILIRUB UR QL STRIP: NEGATIVE
COLOR UR AUTO: YELLOW
GLUCOSE UR STRIP-MCNC: NEGATIVE MG/DL
HGB UR QL STRIP: NEGATIVE
KETONES UR STRIP-MCNC: NEGATIVE MG/DL
LEUKOCYTE ESTERASE UR QL STRIP: NEGATIVE
NITRATE UR QL: NEGATIVE
PH UR STRIP: 6 PH (ref 5–7)
SOURCE: NORMAL
SP GR UR STRIP: 1.01 (ref 1–1.03)
UROBILINOGEN UR STRIP-ACNC: 0.2 EU/DL (ref 0.2–1)

## 2019-11-15 PROCEDURE — 99213 OFFICE O/P EST LOW 20 MIN: CPT | Mod: 25 | Performed by: FAMILY MEDICINE

## 2019-11-15 PROCEDURE — 90471 IMMUNIZATION ADMIN: CPT | Performed by: FAMILY MEDICINE

## 2019-11-15 PROCEDURE — 90682 RIV4 VACC RECOMBINANT DNA IM: CPT | Performed by: FAMILY MEDICINE

## 2019-11-15 PROCEDURE — 87591 N.GONORRHOEAE DNA AMP PROB: CPT | Performed by: FAMILY MEDICINE

## 2019-11-15 PROCEDURE — 87491 CHLMYD TRACH DNA AMP PROBE: CPT | Performed by: FAMILY MEDICINE

## 2019-11-15 PROCEDURE — 81003 URINALYSIS AUTO W/O SCOPE: CPT | Performed by: FAMILY MEDICINE

## 2019-11-15 ASSESSMENT — MIFFLIN-ST. JEOR: SCORE: 1770.86

## 2019-11-15 NOTE — PROGRESS NOTES
"Subjective     Gabe Moon is a 59 year old male who presents to clinic today for the following health issues:    HPI     Tingle or itch at the tip of the penis   Started 3 weeks ago   Notices symptoms daily and more recently - not all the time  Urine doesn't affect -slight worse   No urinary urge   No dysuria  No discharge seen  No sores or skin changes  Remote hx of gonorrhea - but 30 years ago  No other  No symptoms with sexual activity -   Has had other partner - old girlfriend  Currently with other    Prostatitis in  - doesn't feel the same      -------------------------------------    Patient Active Problem List   Diagnosis   (none) - all problems resolved or deleted     Past Surgical History:   Procedure Laterality Date     HERNIA REPAIR      inguinal     LIGATE VEIN       scrotum, '14 left leg       Social History     Tobacco Use     Smoking status: Never Smoker     Smokeless tobacco: Never Used   Substance Use Topics     Alcohol use: Yes     Alcohol/week: 0.0 standard drinks     Family History   Problem Relation Age of Onset     Breast Cancer Mother      Alzheimer Disease Mother      Depression Mother      Mental Illness Mother         bipolar     Heart Murmur Mother         not murmur, racing heart     Diabetes Father      Hypertension Father      Prostate Cancer Father      Osteoarthritis Father         hip replacement (overwt)     Osteoarthritis Paternal Grandfather         hip replacements     Colon Cancer Maternal Cousin 53         of colon cancer at 58           -------------------------------------  Reviewed and updated as needed this visit by Provider         Review of Systems   ROS COMP: Constitutional, HEENT, cardiovascular, pulmonary, gi and gu systems are negative, except as otherwise noted.      Objective    BP (!) 156/80 (BP Location: Right arm)   Pulse 58   Temp 97.3  F (36.3  C) (Oral)   Resp 16   Ht 1.918 m (6' 3.5\")   Wt 85.7 kg (189 lb)   SpO2 99%   BMI 23.31 " kg/m    Body mass index is 23.31 kg/m .  Physical Exam   GENERAL: healthy, alert and no distress    Diagnostic Test Results:  Labs reviewed in Epic  Results for orders placed or performed in visit on 11/15/19 (from the past 24 hour(s))   *UA reflex to Microscopic and Culture ([place] [facility] (except [place] and [place])   Result Value Ref Range    Color Urine Yellow     Appearance Urine Clear     Glucose Urine Negative NEG^Negative mg/dL    Bilirubin Urine Negative NEG^Negative    Ketones Urine Negative NEG^Negative mg/dL    Specific Gravity Urine 1.010 1.003 - 1.035    Blood Urine Negative NEG^Negative    pH Urine 6.0 5.0 - 7.0 pH    Protein Albumin Urine Negative NEG^Negative mg/dL    Urobilinogen Urine 0.2 0.2 - 1.0 EU/dL    Nitrite Urine Negative NEG^Negative    Leukocyte Esterase Urine Negative NEG^Negative    Source Midstream Urine            Assessment & Plan     1. Penis symptom or sign  Irritation at the tip of the penis  Diff dx includes infection - UA is negative  Will await the GC and chlamydia test  Pt will call or RTC if symptoms worsen or do not improve.   - *UA reflex to Microscopic and Culture ([place] [facility] (except [place] and [place])  - NEISSERIA GONORRHOEA PCR  - CHLAMYDIA TRACHOMATIS PCR         No follow-ups on file.    Eden Lowe,   [facility]

## 2019-11-19 NOTE — RESULT ENCOUNTER NOTE
Dear Gabe,   Your test results are all back -   -All of your labs are normal.  If symptoms persist, please let us know.  Let us know if you have any questions.  -Eden Lowe, DO

## 2019-11-26 ENCOUNTER — OFFICE VISIT (OUTPATIENT)
Dept: FAMILY MEDICINE | Facility: CLINIC | Age: 59
End: 2019-11-26
Payer: COMMERCIAL

## 2019-11-26 VITALS
OXYGEN SATURATION: 99 % | RESPIRATION RATE: 16 BRPM | DIASTOLIC BLOOD PRESSURE: 83 MMHG | TEMPERATURE: 98.3 F | HEART RATE: 67 BPM | SYSTOLIC BLOOD PRESSURE: 123 MMHG | BODY MASS INDEX: 23.91 KG/M2 | WEIGHT: 196.38 LBS | HEIGHT: 76 IN

## 2019-11-26 DIAGNOSIS — N36.8 URETHRAL IRRITATION: Primary | ICD-10-CM

## 2019-11-26 LAB
ALBUMIN UR-MCNC: NEGATIVE MG/DL
APPEARANCE UR: CLEAR
BILIRUB UR QL STRIP: NEGATIVE
COLOR UR AUTO: YELLOW
GLUCOSE UR STRIP-MCNC: NEGATIVE MG/DL
HGB UR QL STRIP: NEGATIVE
KETONES UR STRIP-MCNC: NEGATIVE MG/DL
LEUKOCYTE ESTERASE UR QL STRIP: NEGATIVE
NITRATE UR QL: NEGATIVE
PH UR STRIP: 5 PH (ref 5–7)
SOURCE: NORMAL
SP GR UR STRIP: 1.02 (ref 1–1.03)
UROBILINOGEN UR STRIP-ACNC: 0.2 EU/DL (ref 0.2–1)

## 2019-11-26 PROCEDURE — 87591 N.GONORRHOEAE DNA AMP PROB: CPT | Performed by: FAMILY MEDICINE

## 2019-11-26 PROCEDURE — 99214 OFFICE O/P EST MOD 30 MIN: CPT | Performed by: FAMILY MEDICINE

## 2019-11-26 PROCEDURE — 81003 URINALYSIS AUTO W/O SCOPE: CPT | Performed by: FAMILY MEDICINE

## 2019-11-26 PROCEDURE — 87491 CHLMYD TRACH DNA AMP PROBE: CPT | Performed by: FAMILY MEDICINE

## 2019-11-26 ASSESSMENT — PAIN SCALES - GENERAL: PAINLEVEL: NO PAIN (0)

## 2019-11-26 ASSESSMENT — MIFFLIN-ST. JEOR: SCORE: 1804.31

## 2019-11-26 NOTE — PROGRESS NOTES
Subjective   Gabe Moon is a 59 year old male who presents to clinic today for the following health issues:    HPI   Genitourinary symptoms    Duration:  On going issue    Description:  Itching     Intensity:  Non painful     Accompanying signs and symptoms (fever/discharge/nausea/vomiting/back or abdominal pain):  None    History (frequent UTI's/kidney stones/prostate problems): None  Sexually active: YES    Precipitating or alleviating factors: None    Therapies tried and outcome: none   Outcome: N/A    He saw Dr. Lowe a couple weeks ago due to irritation in penis x 3 wks.  Started just before he went to [facility] (there 8-9 days)- just came on.  Checked in with his in-frequent partner as he had been with her a couple weeks prior to sx's starting- she had been tested a few months prior and was negative.  Uses condoms usually, though he states they didn't that last time as she has an IUD.  Doesn't think he was irritated by the IUD strings- that has happened before to him, and he feels he would know what it feels like. The sx's also did not come on soon after that encounter.    On/off irritation that comes and goes.    Day and night sx's, doesn't wake him up.  No discharge, no pain with urination, no blood in urine.      He had prostatitis in 2010.  He had irritation, urgency and frequency all the time- much more significant.  These sx's are much different- no urinary sx's now.      His main partner- she sometimes UTI sx's, ureaplasma.  She's been having mild sx's for about a month, but she' hasn't had it checked out. Uses condom with main partner as well.      Patient Active Problem List   Diagnosis   (none) - all problems resolved or deleted     Past Surgical History:   Procedure Laterality Date     HERNIA REPAIR  2003    inguinal     LIGATE VEIN      '93 scrotum, '14 left leg       Social History     Tobacco Use     Smoking status: Never Smoker     Smokeless tobacco: Never Used   Substance Use Topics     Alcohol  "use: Yes     Alcohol/week: 0.0 standard drinks     Family History   Problem Relation Age of Onset     Breast Cancer Mother      Alzheimer Disease Mother      Depression Mother      Mental Illness Mother         bipolar     Heart Murmur Mother         not murmur, racing heart     Diabetes Father      Hypertension Father      Prostate Cancer Father      Osteoarthritis Father         hip replacement (overwt)     Osteoarthritis Paternal Grandfather         hip replacements     Colon Cancer Maternal Cousin 53         of colon cancer at 58         Current Outpatient Medications   Medication Sig Dispense Refill     cholecalciferol (VITAMIN D) 1000 UNIT tablet Take 1 tablet (1,000 Units) by mouth daily 100 tablet 3     multivitamin, therapeutic with minerals (MULTI-VITAMIN) TABS tablet Take 1 tablet by mouth daily 100 tablet 0     omega 3 1000 MG CAPS Take 1 g by mouth daily 90 capsule      No Known Allergies      Reviewed and updated as needed this visit by Provider  Tobacco  Allergies  Meds  Problems  Med Hx  Surg Hx  Fam Hx         Review of Systems   ROS COMP: Constitutional, HEENT, cardiovascular, pulmonary, gi and gu systems are negative, except as otherwise noted.      Objective    /83 (BP Location: Right arm, Patient Position: Sitting, Cuff Size: Adult Regular)   Pulse 67   Temp 98.3  F (36.8  C) (Oral)   Resp 16   Ht 1.918 m (6' 3.5\")   Wt 89.1 kg (196 lb 6 oz)   SpO2 99%   BMI 24.22 kg/m    Body mass index is 24.22 kg/m .  Physical Exam   GENERAL APPEARANCE: healthy, alert and no distress     EYES: PERRL, sclera clear     RESP: lungs clear to auscultation - no rales, rhonchi or wheezes     CV: regular rates and rhythm, normal S1 S2, no S3 or S4 and no murmur, click or rub      Abdomen: soft, nontender, no HSM or masses and bowel sounds normal     Ext: warm, dry, no edema      : penis normal to inspection, no lesions, fissures or erythema, no discharge with pressure to distal " shaft    Diagnostic Test Results:  Labs reviewed in Epic  Results for orders placed or performed in visit on 11/26/19   *UA reflex to Microscopic and Culture ([place] [facility] (except [place] and [place])     Status: None   Result Value Ref Range    Color Urine Yellow     Appearance Urine Clear     Glucose Urine Negative NEG^Negative mg/dL    Bilirubin Urine Negative NEG^Negative    Ketones Urine Negative NEG^Negative mg/dL    Specific Gravity Urine 1.025 1.003 - 1.035    Blood Urine Negative NEG^Negative    pH Urine 5.0 5.0 - 7.0 pH    Protein Albumin Urine Negative NEG^Negative mg/dL    Urobilinogen Urine 0.2 0.2 - 1.0 EU/dL    Nitrite Urine Negative NEG^Negative    Leukocyte Esterase Urine Negative NEG^Negative    Source Midstream Urine    NEISSERIA GONORRHOEA PCR     Status: None   Result Value Ref Range    Specimen Descrip Urine     N Gonorrhea PCR Negative NEG^Negative   CHLAMYDIA TRACHOMATIS PCR     Status: None   Result Value Ref Range    Specimen Description Urine     Chlamydia Trachomatis PCR Negative NEG^Negative           Assessment & Plan       ICD-10-CM    1. Urethral irritation N36.8 *UA reflex to Microscopic and Culture ([place] [facility] (except [facility])     NEISSERIA GONORRHOEA PCR     CHLAMYDIA TRACHOMATIS PCR     UROLOGY ADULT REFERRAL     On/off distal penis, just under urethral opening per pt on exam, no lesions there on exam.  Given exposure to infrequent partner prior to sx's starting, will recheck gc/chlam again.  UA negative today.  Discussed could try OTC hydrocortisone to the area to see if it helps sx's, but would only do 2x/day for a couple days to avoid se's.  If sx's are not improving, would f/u with urology.  Referral given.     Return in about 1 month (around 12/26/2019) for If symptoms are not improving.    Beatriz Edmondson MD  [facility]

## 2019-11-26 NOTE — NURSING NOTE
"Chief Complaint   Patient presents with     Urinary Problem     RECHECK     /83 (BP Location: Right arm, Patient Position: Sitting, Cuff Size: Adult Regular)   Pulse 67   Temp 98.3  F (36.8  C) (Oral)   Resp 16   Ht 1.918 m (6' 3.5\")   Wt 89.1 kg (196 lb 6 oz)   SpO2 99%   BMI 24.22 kg/m   Estimated body mass index is 24.22 kg/m  as calculated from the following:    Height as of this encounter: 1.918 m (6' 3.5\").    Weight as of this encounter: 89.1 kg (196 lb 6 oz).  bp completed using cuff size: large      Health Maintenance addressed:  NONE    N/a  Kelsie Alvarez CMA on 11/26/2019 at 3:18 PM                "

## 2019-11-29 NOTE — RESULT ENCOUNTER NOTE
Here are your lab results from your recent visit...  -Your STD labs (gonorrhea, chlamydia) are both negative.  -Your urine test was negative as well.    Please let me know if you have any questions.  Best,   Graeme Edmondson MD

## 2019-12-05 DIAGNOSIS — N34.2 URETHRITIS: Primary | ICD-10-CM

## 2019-12-09 ASSESSMENT — ENCOUNTER SYMPTOMS
NECK MASS: 0
HEMATURIA: 0
HOARSE VOICE: 0
SORE THROAT: 1
FLANK PAIN: 0
DIFFICULTY URINATING: 0
SMELL DISTURBANCE: 0
TASTE DISTURBANCE: 0
SINUS CONGESTION: 0
DYSURIA: 1
TROUBLE SWALLOWING: 0

## 2019-12-10 ENCOUNTER — OFFICE VISIT (OUTPATIENT)
Dept: UROLOGY | Facility: CLINIC | Age: 59
End: 2019-12-10
Attending: FAMILY MEDICINE
Payer: COMMERCIAL

## 2019-12-10 VITALS
SYSTOLIC BLOOD PRESSURE: 132 MMHG | DIASTOLIC BLOOD PRESSURE: 70 MMHG | BODY MASS INDEX: 23.75 KG/M2 | WEIGHT: 191 LBS | HEIGHT: 75 IN | OXYGEN SATURATION: 98 % | HEART RATE: 72 BPM

## 2019-12-10 DIAGNOSIS — R30.0 DYSURIA: Primary | ICD-10-CM

## 2019-12-10 LAB
ALBUMIN UR-MCNC: NEGATIVE MG/DL
APPEARANCE UR: CLEAR
BILIRUB UR QL STRIP: NEGATIVE
COLOR UR AUTO: YELLOW
GLUCOSE UR STRIP-MCNC: NEGATIVE MG/DL
HGB UR QL STRIP: NEGATIVE
KETONES UR STRIP-MCNC: ABNORMAL MG/DL
LEUKOCYTE ESTERASE UR QL STRIP: NEGATIVE
NITRATE UR QL: NEGATIVE
PH UR STRIP: 5.5 PH (ref 5–7)
RESIDUAL VOLUME (RV) (EXTERNAL): 17
SOURCE: ABNORMAL
SP GR UR STRIP: 1.02 (ref 1–1.03)
UROBILINOGEN UR STRIP-ACNC: 0.2 EU/DL (ref 0.2–1)

## 2019-12-10 PROCEDURE — 81003 URINALYSIS AUTO W/O SCOPE: CPT | Performed by: PHYSICIAN ASSISTANT

## 2019-12-10 PROCEDURE — 87109 MYCOPLASMA: CPT | Performed by: PHYSICIAN ASSISTANT

## 2019-12-10 PROCEDURE — 99203 OFFICE O/P NEW LOW 30 MIN: CPT | Mod: 25 | Performed by: PHYSICIAN ASSISTANT

## 2019-12-10 PROCEDURE — 51798 US URINE CAPACITY MEASURE: CPT | Performed by: PHYSICIAN ASSISTANT

## 2019-12-10 ASSESSMENT — PAIN SCALES - GENERAL: PAINLEVEL: NO PAIN (1)

## 2019-12-10 ASSESSMENT — MIFFLIN-ST. JEOR: SCORE: 1764.06

## 2019-12-10 NOTE — LETTER
"12/10/2019       RE: Gabe Moon  [address]  [facility] [address]     Dear Colleague,    Thank you for referring your patient, Gabe Moon, to the [facility]. Please see a copy of my visit note below.    CC:  Urethral irritation    HPI:  Gabe Moon is a pleasant 59 year old male who presents in consultation from Dr. Edmondson for evaluation of the above. Has had urethral \"itching\" and irritation for the past month. Seems to be getting better the last week. Not with every void. No gross hematuria. Hx of prostatitis 10 years ago and \"is not like that was\". No pain with erection or hematospermia. No urgency or frequency, fever or chills. Drinks coffee and wine frequently.    SO had mycoplasma earlier this year and he was \"clear\" at that time.     Father had prostate cancer in his late 70s that required cryo. Last PSA in 2017 was 2.49.    Past Medical History:   Diagnosis Date     Blood in semen      Low back pain      Prostate infection      STD (sexually transmitted disease)      Varicose vein of leg        Past Surgical History:   Procedure Laterality Date     HERNIA REPAIR  2003    inguinal     LIGATE VEIN      '93 scrotum, '14 left leg       Social History     Socioeconomic History     Marital status:      Spouse name: Not on file     Number of children: Not on file     Years of education: Not on file     Highest education level: Not on file   Occupational History     Comment: office furniture sales, [employer]   Social Needs     Financial resource strain: Not on file     Food insecurity:     Worry: Not on file     Inability: Not on file     Transportation needs:     Medical: Not on file     Non-medical: Not on file   Tobacco Use     Smoking status: Never Smoker     Smokeless tobacco: Never Used   Substance and Sexual Activity     Alcohol use: Yes     Alcohol/week: 0.0 standard drinks     " "Drug use: No     Comment: once daily     Sexual activity: Yes     Partners: Female   Lifestyle     Physical activity:     Days per week: Not on file     Minutes per session: Not on file     Stress: Not on file   Relationships     Social connections:     Talks on phone: Not on file     Gets together: Not on file     Attends Restorationist service: Not on file     Active member of club or organization: Not on file     Attends meetings of clubs or organizations: Not on file     Relationship status: Not on file     Intimate partner violence:     Fear of current or ex partner: Not on file     Emotionally abused: Not on file     Physically abused: Not on file     Forced sexual activity: Not on file   Other Topics Concern     Parent/sibling w/ CABG, MI or angioplasty before 65F 55M? No   Social History Narrative     Not on file       Family History   Problem Relation Age of Onset     Breast Cancer Mother      Alzheimer Disease Mother      Depression Mother      Mental Illness Mother         bipolar     Heart Murmur Mother         not murmur, racing heart     Diabetes Father      Hypertension Father      Prostate Cancer Father      Osteoarthritis Father         hip replacement (overwt)     Osteoarthritis Paternal Grandfather         hip replacements     Colon Cancer Maternal Cousin 53         of colon cancer at 58       ROS:14 point ROS neg other than the symptoms noted above in the HPI.    No Known Allergies    Current Outpatient Medications   Medication     cholecalciferol (VITAMIN D) 1000 UNIT tablet     multivitamin, therapeutic with minerals (MULTI-VITAMIN) TABS tablet     omega 3 1000 MG CAPS     No current facility-administered medications for this visit.          PEx:   Blood pressure 132/70, pulse 72, height 1.892 m (6' 2.5\"), weight 86.6 kg (191 lb), SpO2 98 %.    PSYCH: NAD  EYES: EOMI  MOUTH: MMM  NECK: Supple, no notable adenopathy  RESP: Unlabored breathing  CARDIAC: No LE edema  SKIN: Warm, no rashes  ABD: " soft, Nontender  NEURO: AAO x3    Urine: neg      A/P: Gabe Moon is a 59 year old male with urethral irritation.  -r/o urethritis  -Eliminate irritants  -Offered to update his PSA today and he elects to discuss with PCP in Jan for his annual exam.     Jadyn Arshad PA-C  [facility]    30 minutes were spent with the patient today, > 50% in counseling and coordination of care

## 2019-12-10 NOTE — NURSING NOTE
"Chief Complaint   Patient presents with     Dysuria       Blood pressure 132/70, pulse 72, height 1.892 m (6' 2.5\"), weight 86.6 kg (191 lb), SpO2 98 %. Body mass index is 24.19 kg/m .    Patient Active Problem List   Diagnosis   (none) - all problems resolved or deleted       No Known Allergies    Current Outpatient Medications   Medication Sig Dispense Refill     cholecalciferol (VITAMIN D) 1000 UNIT tablet Take 1 tablet (1,000 Units) by mouth daily 100 tablet 3     multivitamin, therapeutic with minerals (MULTI-VITAMIN) TABS tablet Take 1 tablet by mouth daily 100 tablet 0     omega 3 1000 MG CAPS Take 1 g by mouth daily 90 capsule        Social History     Tobacco Use     Smoking status: Never Smoker     Smokeless tobacco: Never Used   Substance Use Topics     Alcohol use: Yes     Alcohol/week: 0.0 standard drinks     Drug use: No     Comment: once daily       UA RESULTS:  Recent Labs   Lab Test 12/10/19  1351   COLOR Yellow   APPEARANCE Clear   URINEGLC Negative   URINEBILI Negative   URINEKETONE Trace*   SG 1.025   UBLD Negative   URINEPH 5.5   PROTEIN Negative   UROBILINOGEN 0.2   NITRITE Negative   LEUKEST Negative     PVR 17ML    MICHELLE Kaufman  12/10/2019         "

## 2019-12-10 NOTE — PATIENT INSTRUCTIONS
Mycoplasma and ureaplasma cultures      Below is a list of things that can irritate the bladder and should be eliminated:      Caffeinated soft drinks.    Coffee.    Tea.    Chocolate.    Tomato-based foods.    Acidic juices and fruits. (includes cranberry juice)    Alcohol.    Carbonated drinks.    Aspartame/Nutrasweet.

## 2019-12-10 NOTE — PROGRESS NOTES
"CC:  Urethral irritation    HPI:  Gabe Moon is a pleasant 59 year old male who presents in consultation from Dr. Edmondson for evaluation of the above. Has had urethral \"itching\" and irritation for the past month. Seems to be getting better the last week. Not with every void. No gross hematuria. Hx of prostatitis 10 years ago and \"is not like that was\". No pain with erection or hematospermia. No urgency or frequency, fever or chills. Drinks coffee and wine frequently.    SO had mycoplasma earlier this year and he was \"clear\" at that time.     Father had prostate cancer in his late 70s that required cryo. Last PSA in 2017 was 2.49.    Past Medical History:   Diagnosis Date     Blood in semen      Low back pain      Prostate infection      STD (sexually transmitted disease)      Varicose vein of leg        Past Surgical History:   Procedure Laterality Date     HERNIA REPAIR  2003    inguinal     LIGATE VEIN      '93 scrotum, '14 left leg       Social History     Socioeconomic History     Marital status:      Spouse name: Not on file     Number of children: Not on file     Years of education: Not on file     Highest education level: Not on file   Occupational History     Comment: office furniture sales, [employer]   Social Needs     Financial resource strain: Not on file     Food insecurity:     Worry: Not on file     Inability: Not on file     Transportation needs:     Medical: Not on file     Non-medical: Not on file   Tobacco Use     Smoking status: Never Smoker     Smokeless tobacco: Never Used   Substance and Sexual Activity     Alcohol use: Yes     Alcohol/week: 0.0 standard drinks     Drug use: No     Comment: once daily     Sexual activity: Yes     Partners: Female   Lifestyle     Physical activity:     Days per week: Not on file     Minutes per session: Not on file     Stress: Not on file   Relationships     Social connections:     Talks on phone: Not on file     Gets together: Not on file     " "Attends Jewish service: Not on file     Active member of club or organization: Not on file     Attends meetings of clubs or organizations: Not on file     Relationship status: Not on file     Intimate partner violence:     Fear of current or ex partner: Not on file     Emotionally abused: Not on file     Physically abused: Not on file     Forced sexual activity: Not on file   Other Topics Concern     Parent/sibling w/ CABG, MI or angioplasty before 65F 55M? No   Social History Narrative     Not on file       Family History   Problem Relation Age of Onset     Breast Cancer Mother      Alzheimer Disease Mother      Depression Mother      Mental Illness Mother         bipolar     Heart Murmur Mother         not murmur, racing heart     Diabetes Father      Hypertension Father      Prostate Cancer Father      Osteoarthritis Father         hip replacement (overwt)     Osteoarthritis Paternal Grandfather         hip replacements     Colon Cancer Maternal Cousin 53         of colon cancer at 58       ROS:14 point ROS neg other than the symptoms noted above in the HPI.    No Known Allergies    Current Outpatient Medications   Medication     cholecalciferol (VITAMIN D) 1000 UNIT tablet     multivitamin, therapeutic with minerals (MULTI-VITAMIN) TABS tablet     omega 3 1000 MG CAPS     No current facility-administered medications for this visit.          PEx:   Blood pressure 132/70, pulse 72, height 1.892 m (6' 2.5\"), weight 86.6 kg (191 lb), SpO2 98 %.    PSYCH: NAD  EYES: EOMI  MOUTH: MMM  NECK: Supple, no notable adenopathy  RESP: Unlabored breathing  CARDIAC: No LE edema  SKIN: Warm, no rashes  ABD: soft, Nontender  NEURO: AAO x3    Urine: neg      A/P: Gabe Moon is a 59 year old male with urethral irritation.  -r/o urethritis  -Eliminate irritants  -Offered to update his PSA today and he elects to discuss with PCP in  for his annual exam.     [facility]    30 minutes were spent " with the patient today, > 50% in counseling and coordination of care

## 2019-12-16 DIAGNOSIS — Z22.39 CARRIER OF UREAPLASMA UREALYTICUM: Primary | ICD-10-CM

## 2019-12-16 LAB
BACTERIA SPEC CULT: ABNORMAL
SPECIMEN SOURCE: ABNORMAL

## 2019-12-16 RX ORDER — DOXYCYCLINE 100 MG/1
100 CAPSULE ORAL 2 TIMES DAILY
Qty: 20 CAPSULE | Refills: 0 | Status: SHIPPED | OUTPATIENT
Start: 2019-12-16 | End: 2020-10-01

## 2019-12-18 LAB
BACTERIA SPEC CULT: NORMAL
SPECIMEN SOURCE: NORMAL

## 2019-12-27 ENCOUNTER — THERAPY VISIT (OUTPATIENT)
Dept: PHYSICAL THERAPY | Facility: CLINIC | Age: 59
End: 2019-12-27
Payer: COMMERCIAL

## 2019-12-27 ENCOUNTER — TELEPHONE (OUTPATIENT)
Dept: FAMILY MEDICINE | Facility: CLINIC | Age: 59
End: 2019-12-27

## 2019-12-27 DIAGNOSIS — M54.6 PAIN IN THORACIC SPINE: Primary | ICD-10-CM

## 2019-12-27 DIAGNOSIS — M54.6 PAIN IN THORACIC SPINE: ICD-10-CM

## 2019-12-27 PROCEDURE — 97110 THERAPEUTIC EXERCISES: CPT | Mod: GP | Performed by: PHYSICAL THERAPIST

## 2019-12-27 PROCEDURE — 97161 PT EVAL LOW COMPLEX 20 MIN: CPT | Mod: GP | Performed by: PHYSICAL THERAPIST

## 2019-12-27 PROCEDURE — 97140 MANUAL THERAPY 1/> REGIONS: CPT | Mod: GP | Performed by: PHYSICAL THERAPIST

## 2019-12-27 NOTE — TELEPHONE ENCOUNTER
CW    Patient called.  Asking for referral to [facility] [place] for Thoracic pain.  Saw [facility] past summer for knee pain.    Due for physical in January/February.  Last 1/25/19    Order T'd up.  Thanks,  Sonia Blake RN

## 2019-12-27 NOTE — TELEPHONE ENCOUNTER
Reason for Call:  Other Referral    Detailed comments: Will like to be referred for Physical Therapy    Phone Number Patient can be reached at: Home number on file [phone] (home)    Best Time: Any    Can we leave a detailed message on this number? YES    Call taken on 12/27/2019 at 11:06 AM by Vernell Munoz

## 2019-12-27 NOTE — PROGRESS NOTES
[facility] Initial Evaluation  Subjective:  The history is provided by the patient. No  was used.   Type of problem:  Cervical spine and thoracic spine   Condition occurred with:  Lifting. This is a new condition   Problem details: 2 weeks ago when doing a dead lift with weight he strained upper back in the C-T junction. Pain is still acute when trying to reach or lift with arms. Also shrug and Retraction provokes pain.   Patient reports pain:  Upper thoracic and lower cervical spine. Radiates to:  None. Associated symptoms:  Loss of strength and loss of motion/stiffness. Symptoms are exacerbated by certain positions, rotating head and driving and relieved by rest.    Gabe Moon being seen for upper back.   Problem began 12/13/2019. Problem occurred: lifting  and reported as 5/10 on pain scale. General health as reported by patient is excellent. Pertinent medical history includes:  None.    Surgeries include:  None.  Current medications:  Anti-inflammatory.   Primary job tasks include:  Computer work.            Restrictions include:  Working in normal job without restrictions.    Barriers include:  None as reported by patient.  Red flags:  None as reported by patient.                      Objective:        Flexibility/Screens:     Upper Extremity:    Decreased left upper extremity flexibility at:  Pectoralis Major and Pectoralis Minor    Decreased right upper extremity flexibility present at:  Pectoralis Major and Pectoralis Minor                      Cervical/Thoracic Evaluation    AROM:  AROM Cervical:    Flexion:          90% tight central T1-2  Extension:       Able  Rotation:         Left: 75% pain central     Right: 75% pain central  Side Bend:      Left:     Right:   AROM Thoracic:    Flexion:             Extension:          Very painful upper T spine  Rotation:            Left:     Right:      Headaches: none  Cervical Myotomes:  normal                  DTR's:   normal          Cervical Dermatomes:  normal                    Cervical Palpation:  : central ligament C7-T2.  Tenderness present at Left:    Upper Trap  Tenderness present at Right:    Upper Trap  Functional Tests:  not assessed        Cord Sign:  normal                                            General     ROS    Assessment/Plan:    Patient is a 59 year old male with cervical and thoracic complaints.    Patient has the following significant findings with corresponding treatment plan.                Diagnosis 1:  Cervical thoracic junction strain  Pain -  hot/cold therapy, US, manual therapy, self management, education, directional preference exercise and home program  Decreased ROM/flexibility - manual therapy and therapeutic exercise  Impaired muscle performance - neuro re-education  Decreased function - therapeutic activities    Therapy Evaluation Codes:   1) History comprised of:   Personal factors that impact the plan of care:      None.    Comorbidity factors that impact the plan of care are:      None.     Medications impacting care: Anti-inflammatory.  2) Examination of Body Systems comprised of:   Body structures and functions that impact the plan of care:      Cervical spine and Thoracic Spine.   Activity limitations that impact the plan of care are:      Driving, Lifting and Sports.  3) Clinical presentation characteristics are:   Stable/Uncomplicated.  4) Decision-Making    Low complexity using standardized patient assessment instrument and/or measureable assessment of functional outcome.  Cumulative Therapy Evaluation is: Low complexity.    Previous and current functional limitations:  (See Goal Flow Sheet for this information)    Short term and Long term goals: (See Goal Flow Sheet for this information)     Communication ability:  Patient appears to be able to clearly communicate and understand verbal and written communication and follow directions correctly.  Treatment Explanation - The following  has been discussed with the patient:   RX ordered/plan of care  Anticipated outcomes  Possible risks and side effects  This patient would benefit from PT intervention to resume normal activities.   Rehab potential is excellent.    Frequency:  1 X week, once daily  Duration:  for 6 weeks  Discharge Plan:  Achieve all LTG.  Independent in home treatment program.  Reach maximal therapeutic benefit.    Please refer to the daily flowsheet for treatment today, total treatment time and time spent performing 1:1 timed codes.

## 2019-12-27 NOTE — TELEPHONE ENCOUNTER
CW,  Please see below.  Pended possible order.  Please authorize if appropriate.  Thanks,  Helen Whatley RN

## 2019-12-27 NOTE — TELEPHONE ENCOUNTER
Please send referral to [facility] in [place] for Dr. Thompson.  Referral is for a strained ligament at the T-1 vertebrae

## 2020-01-24 ENCOUNTER — THERAPY VISIT (OUTPATIENT)
Dept: PHYSICAL THERAPY | Facility: CLINIC | Age: 60
End: 2020-01-24
Payer: COMMERCIAL

## 2020-01-24 DIAGNOSIS — M54.6 PAIN IN THORACIC SPINE: ICD-10-CM

## 2020-01-24 PROCEDURE — 97035 APP MDLTY 1+ULTRASOUND EA 15: CPT | Mod: GP | Performed by: PHYSICAL THERAPIST

## 2020-01-24 PROCEDURE — 97110 THERAPEUTIC EXERCISES: CPT | Mod: GP | Performed by: PHYSICAL THERAPIST

## 2020-01-24 PROCEDURE — 97140 MANUAL THERAPY 1/> REGIONS: CPT | Mod: GP | Performed by: PHYSICAL THERAPIST

## 2020-02-07 ENCOUNTER — THERAPY VISIT (OUTPATIENT)
Dept: PHYSICAL THERAPY | Facility: CLINIC | Age: 60
End: 2020-02-07
Payer: COMMERCIAL

## 2020-02-07 DIAGNOSIS — M54.6 PAIN IN THORACIC SPINE: ICD-10-CM

## 2020-02-07 PROCEDURE — 97035 APP MDLTY 1+ULTRASOUND EA 15: CPT | Mod: GP | Performed by: PHYSICAL THERAPIST

## 2020-02-07 PROCEDURE — 97110 THERAPEUTIC EXERCISES: CPT | Mod: GP | Performed by: PHYSICAL THERAPIST

## 2020-02-07 PROCEDURE — 97140 MANUAL THERAPY 1/> REGIONS: CPT | Mod: GP | Performed by: PHYSICAL THERAPIST

## 2020-03-10 ENCOUNTER — HEALTH MAINTENANCE LETTER (OUTPATIENT)
Age: 60
End: 2020-03-10

## 2020-03-16 PROBLEM — M54.6 PAIN IN THORACIC SPINE: Status: RESOLVED | Noted: 2019-12-27 | Resolved: 2020-03-16

## 2020-03-16 NOTE — PROGRESS NOTES
DISCHARGE REPORT    Progress reporting period is from 12/27/19 to 2/7/20.       SUBJECTIVE  Subjective changes noted by patient:  .  Subjective: Slightly better, but still struggling with jan skiing and scap ret depression    Current pain level is   .     Previous pain level was    .   Changes in function:  Yes (See Goal flowsheet attached for changes in current functional level)  Adverse reaction to treatment or activity: None    OBJECTIVE  Changes noted in objective findings:  Patient has failed to return to therapy so current objective findings are unknown.  Objective: Pain L of T5, but provoked by R ROT and SB. Better after mid range EXT and lats stretch     ASSESSMENT/PLAN  Updated problem list and treatment plan: Diagnosis 1:  Thoracic pain  Pain -  self management, education, directional preference exercise and home program  Decreased function - therapeutic activities  STG/LTGs have been met or progress has been made towards goals:  Yes (See Goal flow sheet completed today.)  Assessment of Progress: The patient's condition is improving.  The patient's condition has potential to improve.  Patient has not returned to therapy.  Current status is unknown and discharge G code cannot be reported.  Self Management Plans:  Patient has been instructed in a home treatment program.    Gabe continues to require the following intervention to meet STG and LTG's:  PT intervention is no longer required to meet STG/LTG.    Recommendations:  This patient is ready to be discharged from therapy and continue their home treatment program.    Please refer to the daily flowsheet for treatment today, total treatment time and time spent performing 1:1 timed codes.

## 2020-04-27 NOTE — MR AVS SNAPSHOT
After Visit Summary   2/17/2017    Gabe Moon    MRN: [id number]           Patient Information     Date Of Birth          1960        Visit Information        Provider Department      2/17/2017 12:30 PM Barbara Pt, [facility] Physical [facility]        Today's Diagnoses     Acute left ankle pain           Follow-ups after your visit        Who to contact     If you have questions or need follow up information about today's clinic visit or your schedule please contact [facility]ER[facility] directly at [phone].  Normal or non-critical lab and imaging results will be communicated to you by [email or web address], letter or phone within 4 business days after the clinic has received the results. If you do not hear from us within 7 days, please contact the clinic through EdgeConneXt or phone. If you have a critical or abnormal lab result, we will notify you by phone as soon as possible.  Submit refill requests through [email or web address] or call your pharmacy and they will forward the refill request to us. Please allow 3 business days for your refill to be completed.          Additional Information About Your Visit        MyChart Information     [email or web address] gives you secure access to your electronic health record. If you see a primary care provider, you can also send messages to your care team and make appointments. If you have questions, please call your primary care clinic.  If you do not have a primary care provider, please call [phone] and they will assist you.        Care EveryWhere ID     This is your Care EveryWhere ID. This could be used by other organizations to access your [facility] medical records  [id number]         Blood Pressure from Last 3 Encounters:   01/11/17 110/72   09/14/16 110/70   07/12/16 118/73    Weight from Last 3 Encounters:   01/11/17 88.6 kg (195 lb 4.8 oz)   09/14/16 86.7 kg (191 lb 1.6 oz)   07/12/16 87.8 kg (193 lb  Left message- informing patient needs to reschedule due to covid -19    9.6 oz)              We Performed the Following     NEUROMUSCULAR RE-EDUCATION     THERAPEUTIC EXERCISES        Primary Care Provider    None Specified       No primary provider on file.        Thank you!     Thank you for choosing [facility]  for your care. Our goal is always to provide you with excellent care. Hearing back from our patients is one way we can continue to improve our services. Please take a few minutes to complete the written survey that you may receive in the mail after your visit with us. Thank you!             Your Updated Medication List - Protect others around you: Learn how to safely use, store and throw away your medicines at www.disposemymeds.org.      Notice  As of 2/17/2017  1:05 PM    You have not been prescribed any medications.

## 2020-09-30 NOTE — PROGRESS NOTES
[facility] - PRIMARY CARE SKIN    CC: lesion  SUBJECTIVE:   Gabe Moon is a(n) 60 year old male who presents to clinic today for :    Bothersome lesions noticed by the patient or other skin concerns :  Issue One: lesion on the scalp , mid back and behind the left knee that get irritated . No bleeding or tender      Personal history of skin cancer : NO - has had lesions on scalp excised and treated with cryotherapy.    Family history of skin cancer : NO.      Sun Exposure History  Previous history of significant sun exposure:  Blistering sunburns : NO  Tanning beds : NO  Sunscreen Use : YES, frequency : daily, SPF : 25-30.  Sun-protective clothing use : No  Wide-brimmed hats : Yes  Sunglasses : Yes  Seeks shade : Yes    Occupation :  (indoor).      Refer to electronic medical record (EMR) for past medical history and medications.      ROS: 14 point review of systems was negative except the symptoms listed above in the HPI.        OBJECTIVE:   GENERAL: healthy, alert and no distress.  HEENT: PERRL. Conjunctiva, sclera clear.  SKIN: Fletcher Skin Type - I.  Head was examined  Skin Pertinent Findings:  Scalp - 4 coarse textured brown lesion ; left posterior knee, midline of upper  back         Name : Liquid Nitrogen Cry-Ac Cryotherapy.  Indication : Irritated/Inflamed Benign Lesion.  Location(s) : as above  Completed by : Franny Flores MD  Note : Discussed natural history of lesion and treatment options. Prior to treatment, we discussed inflammation, tenderness post-procedure, the healing process, and the risks of pain, infection, scarring, blistering, and hypo-/hyperpigmentation after healing. Explained that these lesions may grow back and may need additional treatment or re-treatment. The patient expressed a desire to proceed with cryotherapy.    The lesion(s) were treated with liquid nitrogen Cry-Ac, five second freeze repeated twice with a pause to allow for the area to thaw.    Patient  tolerated the procedure well and left in good condition.  Total number of lesions treated : 6          ASSESSMENT:     Encounter Diagnosis   Name Primary?     Inflamed seborrheic keratosis Yes         PLAN:   Patient Instructions   CRYOTHERAPY POST-TREATMENT CARE INSTRUCTIONS  Liquid nitrogen is mildly uncomfortable when applied to the skin, but the discomfort rapidly subsides.    Post-Treatment:  You may experience burning and/or stinging immediately following the procedure. The discomfort from the procedure may persist over the next 12-24 hours. The area treated will look pinker and slightly swollen before the healing process begins. You may also notice redness, swelling, tenderness, weeping and crusts or scabs. Healing time is approximately 10-14 days.    Blister - You may or may notice blistering from the freezing. If you develop an uncomfortable blister from today's treatment, you may gently puncture this with a needle that has been cleaned with alcohol. However, do not remove the protective skin layer of the blister.    Scab - After a few days, you may notice scaliness or scab formation. Do not pick at the scabs because this may cause slower healing and a permanent scar.    The skin may appear temporarily darker at the treatment site, but this usually fades over a period of months, provided that the area is protected from the sun.    Care of the areas treated:    Wash the area with a mild cleanser.    Gently pat dry.    Do not rub.     Keep protected from the sun during the healing process and for a full year following treatment as the skin continues to remodel during this time.    If you experience dryness or persistent burning, you may use Vaseline or Aquaphor ointment sparingly.    Do not use Neosporin, as many people eventually develop a medication allergy, that can easily be confused with an infection, to Neomycin.    Return if:  If there is any concern that the lesion has persisted, make an appointment  for a re-check. Healing time does vary depending on your individual healing process and the area of the body treated. Most patients will be healed in one month.    Signs of Infection:  Thankfully this is rare. However if you notice persistent colored drainage, increasing pain, fever or other signs of infection, please call back at [phone].      The patient was counseled about sunscreens and sun avoidance. The patient was counseled to check the skin regularly and report any lesion that is new, changing, itching, scabbing, bleeding or otherwise bothersome. The patient was discharged ambulatory and in stable condition.      TT: 25 minutes.  CT: 15 minutes.

## 2020-10-01 ENCOUNTER — OFFICE VISIT (OUTPATIENT)
Dept: FAMILY MEDICINE | Facility: CLINIC | Age: 60
End: 2020-10-01
Payer: COMMERCIAL

## 2020-10-01 VITALS — DIASTOLIC BLOOD PRESSURE: 72 MMHG | SYSTOLIC BLOOD PRESSURE: 122 MMHG

## 2020-10-01 DIAGNOSIS — L82.0 INFLAMED SEBORRHEIC KERATOSIS: Primary | ICD-10-CM

## 2020-10-01 PROCEDURE — 17110 DESTRUCTION B9 LES UP TO 14: CPT | Performed by: FAMILY MEDICINE

## 2020-10-01 PROCEDURE — 99213 OFFICE O/P EST LOW 20 MIN: CPT | Mod: 25 | Performed by: FAMILY MEDICINE

## 2020-10-01 NOTE — LETTER
10/1/2020         RE: Gabe Moon  [address] [facility] [place] [address]        Dear Colleague,    Thank you for referring your patient, Gabe Moon, to the [facility]. Please see a copy of my visit note below.    [facility] - PRIMARY CARE SKIN    CC: lesion  SUBJECTIVE:   Gabe Moon is a(n) 60 year old male who presents to clinic today for :    Bothersome lesions noticed by the patient or other skin concerns :  Issue One: lesion on the scalp , mid back and behind the left knee that get irritated . No bleeding or tender      Personal history of skin cancer : NO - has had lesions on scalp excised and treated with cryotherapy.    Family history of skin cancer : NO.      Sun Exposure History  Previous history of significant sun exposure:  Blistering sunburns : NO  Tanning beds : NO  Sunscreen Use : YES, frequency : daily, SPF : 25-30.  Sun-protective clothing use : No  Wide-brimmed hats : Yes  Sunglasses : Yes  Seeks shade : Yes    Occupation :  (indoor).      Refer to electronic medical record (EMR) for past medical history and medications.      ROS: 14 point review of systems was negative except the symptoms listed above in the HPI.        OBJECTIVE:   GENERAL: healthy, alert and no distress.  HEENT: PERRL. Conjunctiva, sclera clear.  SKIN: Fletcher Skin Type - I.  Head was examined  Skin Pertinent Findings:  Scalp - 4 coarse textured brown lesion ; left posterior knee, midline of upper  back         Name : Liquid Nitrogen Cry-Ac Cryotherapy.  Indication : Irritated/Inflamed Benign Lesion.  Location(s) : as above  Completed by : Franny Flores MD  Note : Discussed natural history of lesion and treatment options. Prior to treatment, we discussed inflammation, tenderness post-procedure, the healing process, and the risks of pain, infection, scarring, blistering, and hypo-/hyperpigmentation after healing. Explained that these lesions may grow back  and may need additional treatment or re-treatment. The patient expressed a desire to proceed with cryotherapy.    The lesion(s) were treated with liquid nitrogen Cry-Ac, five second freeze repeated twice with a pause to allow for the area to thaw.    Patient tolerated the procedure well and left in good condition.  Total number of lesions treated : 6          ASSESSMENT:     Encounter Diagnosis   Name Primary?     Inflamed seborrheic keratosis Yes         PLAN:   Patient Instructions   CRYOTHERAPY POST-TREATMENT CARE INSTRUCTIONS  Liquid nitrogen is mildly uncomfortable when applied to the skin, but the discomfort rapidly subsides.    Post-Treatment:  You may experience burning and/or stinging immediately following the procedure. The discomfort from the procedure may persist over the next 12-24 hours. The area treated will look pinker and slightly swollen before the healing process begins. You may also notice redness, swelling, tenderness, weeping and crusts or scabs. Healing time is approximately 10-14 days.    Blister - You may or may notice blistering from the freezing. If you develop an uncomfortable blister from today's treatment, you may gently puncture this with a needle that has been cleaned with alcohol. However, do not remove the protective skin layer of the blister.    Scab - After a few days, you may notice scaliness or scab formation. Do not pick at the scabs because this may cause slower healing and a permanent scar.    The skin may appear temporarily darker at the treatment site, but this usually fades over a period of months, provided that the area is protected from the sun.    Care of the areas treated:    Wash the area with a mild cleanser.    Gently pat dry.    Do not rub.     Keep protected from the sun during the healing process and for a full year following treatment as the skin continues to remodel during this time.    If you experience dryness or persistent burning, you may use Vaseline or  Aquaphor ointment sparingly.    Do not use Neosporin, as many people eventually develop a medication allergy, that can easily be confused with an infection, to Neomycin.    Return if:  If there is any concern that the lesion has persisted, make an appointment for a re-check. Healing time does vary depending on your individual healing process and the area of the body treated. Most patients will be healed in one month.    Signs of Infection:  Thankfully this is rare. However if you notice persistent colored drainage, increasing pain, fever or other signs of infection, please call back at [phone].      The patient was counseled about sunscreens and sun avoidance. The patient was counseled to check the skin regularly and report any lesion that is new, changing, itching, scabbing, bleeding or otherwise bothersome. The patient was discharged ambulatory and in stable condition.      TT: 25 minutes.  CT: 15 minutes.          Again, thank you for allowing me to participate in the care of your patient.        Sincerely,        Tahira Flores MD

## 2020-10-01 NOTE — PATIENT INSTRUCTIONS
CRYOTHERAPY POST-TREATMENT CARE INSTRUCTIONS  Liquid nitrogen is mildly uncomfortable when applied to the skin, but the discomfort rapidly subsides.    Post-Treatment:  You may experience burning and/or stinging immediately following the procedure. The discomfort from the procedure may persist over the next 12-24 hours. The area treated will look pinker and slightly swollen before the healing process begins. You may also notice redness, swelling, tenderness, weeping and crusts or scabs. Healing time is approximately 10-14 days.    Blister - You may or may notice blistering from the freezing. If you develop an uncomfortable blister from today's treatment, you may gently puncture this with a needle that has been cleaned with alcohol. However, do not remove the protective skin layer of the blister.    Scab - After a few days, you may notice scaliness or scab formation. Do not pick at the scabs because this may cause slower healing and a permanent scar.    The skin may appear temporarily darker at the treatment site, but this usually fades over a period of months, provided that the area is protected from the sun.    Care of the areas treated:    Wash the area with a mild cleanser.    Gently pat dry.    Do not rub.     Keep protected from the sun during the healing process and for a full year following treatment as the skin continues to remodel during this time.    If you experience dryness or persistent burning, you may use Vaseline or Aquaphor ointment sparingly.    Do not use Neosporin, as many people eventually develop a medication allergy, that can easily be confused with an infection, to Neomycin.    Return if:  If there is any concern that the lesion has persisted, make an appointment for a re-check. Healing time does vary depending on your individual healing process and the area of the body treated. Most patients will be healed in one month.    Signs of Infection:  Thankfully this is rare. However if you notice  persistent colored drainage, increasing pain, fever or other signs of infection, please call back at [phone].

## 2021-02-18 NOTE — PROGRESS NOTES
SUBJECTIVE:   CC: Gabe Moon is an 60 year old male who presents for preventative health visit.       Patient has been advised of split billing requirements and indicates understanding: Yes     Healthy Habits:     Getting at least 3 servings of Calcium per day:  Yes    Bi-annual eye exam:  Yes    Dental care twice a year:  Yes    Sleep apnea or symptoms of sleep apnea:  None    Diet:  Regular (no restrictions)    Frequency of exercise:  4-5 days/week    Duration of exercise:  45-60 minutes    Taking medications regularly:  Yes    Medication side effects:  None    PHQ-2 Total Score: 0    Additional concerns today:  Yes    Patient would like to discuss on and off right sided rib pain, no trauma to that side of body. Feels like a sore muscle     R sided rib pain, since ~8/20, discomfort at the level of a sore muscle.  Comes/goes.  If having a lot of activity, feels it more.    May be aggravated slightly by yoga stretches?  Could really feel it stretching out later at night.  Hasn't gotten better or worse over these several months.  No pain with breathing.  No chest pain/SOB with exercise.      Concerns with some newer sibling health issues-   --Pt's identitical twin- recently seen for 'speeding heart rate'.  Not sure the exact dx, but thinks more tachycardia, and doesn't recall hearing a.fib.  Mother had same condition.  Neither have been on blood thinners to his knowledge.  Twin was fine after they put him on meds to control his heart rate.  ---His younger brother was just dx with prostate cancer.  He's now considering treatment options (s/p bx findings).    Patient's resting HR is ~55 bpm.  Pt is in better health, exercises more, than his brother or mother.      Exercise- lifting weights, riding spin bike ([employer]) or riding outdoors.  Swimming outdoors (indoor prior to COVID).    Yoga.    Wt is down with the peloton bike.  Actually tries to keep wt up.  Doesn't want to go any lower than 188 lbs.      Today's  PHQ-2 Score:   PHQ-2 ( 1999 Pfizer) 2/16/2021   Q1: Little interest or pleasure in doing things 0   Q2: Feeling down, depressed or hopeless 0   PHQ-2 Score 0   Q1: Little interest or pleasure in doing things Not at all   Q2: Feeling down, depressed or hopeless Not at all   PHQ-2 Score 0       Abuse: Current or Past(Physical, Sexual or Emotional)- No  Do you feel safe in your environment? Yes    Have you ever done Advance Care Planning? (For example, a Health Directive, POLST, or a discussion with a medical provider or your loved ones about your wishes): Yes, patient states has an Advance Care Planning document and will bring a copy to the clinic.    Social History     Tobacco Use     Smoking status: Never Smoker     Smokeless tobacco: Never Used   Substance Use Topics     Alcohol use: Yes     Alcohol/week: 0.0 standard drinks     If you drink alcohol do you typically have >3 drinks per day or >7 drinks per week? No    Alcohol Use 2/16/2021   Prescreen: >3 drinks/day or >7 drinks/week? No   Prescreen: >3 drinks/day or >7 drinks/week? -   No flowsheet data found.    Last PSA:   PSA   Date Value Ref Range Status   09/15/2017 2.49 0 - 4 ug/L Final     Comment:     Assay Method:  Chemiluminescence using Siemens Vista analyzer       Reviewed orders with patient. Reviewed health maintenance and updated orders accordingly - Yes    Lab work is in process  Labs reviewed in EPIC  BP Readings from Last 3 Encounters:   02/19/21 126/81   10/01/20 122/72   12/10/19 132/70    Wt Readings from Last 3 Encounters:   02/19/21 85.5 kg (188 lb 6.4 oz)   12/10/19 86.6 kg (191 lb)   11/26/19 89.1 kg (196 lb 6 oz)                  Patient Active Problem List   Diagnosis   (none) - all problems resolved or deleted     Past Surgical History:   Procedure Laterality Date     ABDOMEN SURGERY  may 1991    testicular varicose vein repair     COLONOSCOPY  May 2013    MRI Virtual Colonoscopy     HERNIA REPAIR  2003    inguinal     LIGATE VEIN       '93 scrotum, '14 left leg     SOFT TISSUE SURGERY  2002    Lumbar spine issues, facet joint sundrome       Social History     Tobacco Use     Smoking status: Never Smoker     Smokeless tobacco: Never Used   Substance Use Topics     Alcohol use: Yes     Alcohol/week: 0.0 standard drinks     Family History   Problem Relation Age of Onset     Breast Cancer Mother      Alzheimer Disease Mother      Depression Mother      Mental Illness Mother         Bi-Polar, Dementia, passed away age 84     Arrhythmia Mother         fast HR, continued on meds, no blood thinners     Coronary Artery Disease Mother      Diabetes Father         Type II     Hypertension Father         managed with Medication     Prostate Cancer Father         diagnosis at age 77, now 88     Osteoarthritis Father         hip replacement (overwt)     Coronary Artery Disease Father         Congestive Heart Failure, diagnonsed      Osteoarthritis Paternal Grandfather         hip replacements     Colon Cancer Maternal Cousin 53         of colon cancer at 58     Prostate Cancer Brother 55     Arrhythmia Brother         ER visit, meds x 10 days     Prostate Cancer Brother         diagnosis 2021, treatment pending         Current Outpatient Medications   Medication Sig Dispense Refill     cholecalciferol (VITAMIN D) 1000 UNIT tablet Take 1 tablet (1,000 Units) by mouth daily 100 tablet 3     multivitamin, therapeutic with minerals (MULTI-VITAMIN) TABS tablet Take 1 tablet by mouth daily 100 tablet 0     omega 3 1000 MG CAPS Take 1 g by mouth daily 90 capsule      No Known Allergies  Recent Labs   Lab Test 21  0936 09/15/16  0748   LDL 96 100*   HDL 93 86   TRIG 49 57   CR 0.82  --    GFRESTIMATED >90  --    GFRESTBLACK >90  --    POTASSIUM 4.0  --         Reviewed and updated as needed this visit by clinical staff                 Reviewed and updated as needed this visit by Provider                    Review of Systems  10 point ROS of  "systems including Constitutional, Eyes, Respiratory, Cardiovascular, Gastroenterology, Genitourinary, Integumentary, Muscularskeletal, Psychiatric were all negative except for pertinent positives noted in my HPI.      OBJECTIVE:   /81 (BP Location: Left arm, Patient Position: Sitting, Cuff Size: Adult Large)   Pulse 80   Resp 14   Ht 1.892 m (6' 2.5\")   Wt 85.5 kg (188 lb 6.4 oz)   SpO2 98%   BMI 23.87 kg/m      Physical Exam  GENERAL: healthy, alert and no distress  EYES: Eyes grossly normal to inspection, PERRL and conjunctivae and sclerae normal  HENT: ear canals and TM's normal, nose and mouth without ulcers or lesions  NECK: no adenopathy, no asymmetry, masses, or scars and thyroid normal to palpation  RESP: lungs clear to auscultation - no rales, rhonchi or wheezes  CV: regular rate and rhythm, normal S1 S2, no S3 or S4, no murmur, click or rub, no peripheral edema and peripheral pulses strong  ABDOMEN: soft, nontender, no hepatosplenomegaly, no masses and bowel sounds normal  MS: no gross musculoskeletal defects noted, no edema  SKIN: no suspicious lesions or rashes  NEURO: Normal strength and tone, mentation intact and speech normal  PSYCH: mentation appears normal, affect normal/bright    Diagnostic Test Results:  Labs reviewed in Epic    ASSESSMENT/PLAN:       ICD-10-CM    1. Routine general medical examination at a health care facility  Z00.00 Basic metabolic panel  (Ca, Cl, CO2, Creat, Gluc, K, Na, BUN)     GASTROENTEROLOGY ADULT REF PROCEDURE ONLY   2. Right-sided chest wall pain  R07.89    3. Family history of sinus tachycardia  Z82.49    4. Family history of prostate cancer  Z80.42 PSA, screen   5. CARDIOVASCULAR SCREENING; LDL GOAL LESS THAN 130  Z13.6 Lipid panel reflex to direct LDL Fasting     CPE- Discussed diet, calcium and exercise.  Eye and dental care UTD or recommended f/u.  No immunizations needed today.  See orders below for tests ordered and screening needed.      Right sided " "chest wall pain- intracostal muscular area in R lower ribs, worse with movement.  No point tenderness on ribs.  Discussed likely muscle strain, no focal bony tenderness.  Rec avoiding aggravating stretches/movements, heat/ice.  RTC if symptoms persist or worsen.     Fam h/o likely sinus tachycardia- mother and brother.  He has not had sx's, and exam normal today.  Knows how to check pulse, and has HR monitor options.  RTC if symptoms persist or worsen.     Fam h/o prostate cancer- younger brother recently dx.  Pt's last PSA a few yrs ago- normal.  No urinary/prostate sx's.  Will recheck today.    Patient has been advised of split billing requirements and indicates understanding: Yes     COUNSELING:   Reviewed preventive health counseling, as reflected in patient instructions    Estimated body mass index is 24.19 kg/m  as calculated from the following:    Height as of 12/10/19: 1.892 m (6' 2.5\").    Weight as of 12/10/19: 86.6 kg (191 lb).         He reports that he has never smoked. He has never used smokeless tobacco.      Counseling Resources:  ATP IV Guidelines  Pooled Cohorts Equation Calculator  FRAX Risk Assessment  ICSI Preventive Guidelines  Dietary Guidelines for Americans, 2010  USDA's MyPlate  ASA Prophylaxis  Lung CA Screening    Beatriz Edmondson MD  [facility] [place]  "

## 2021-02-19 ENCOUNTER — OFFICE VISIT (OUTPATIENT)
Dept: FAMILY MEDICINE | Facility: CLINIC | Age: 61
End: 2021-02-19
Payer: COMMERCIAL

## 2021-02-19 VITALS
WEIGHT: 188.4 LBS | SYSTOLIC BLOOD PRESSURE: 126 MMHG | OXYGEN SATURATION: 98 % | HEART RATE: 80 BPM | RESPIRATION RATE: 14 BRPM | HEIGHT: 75 IN | BODY MASS INDEX: 23.43 KG/M2 | DIASTOLIC BLOOD PRESSURE: 81 MMHG

## 2021-02-19 DIAGNOSIS — Z00.00 ROUTINE GENERAL MEDICAL EXAMINATION AT A HEALTH CARE FACILITY: Primary | ICD-10-CM

## 2021-02-19 DIAGNOSIS — Z13.6 CARDIOVASCULAR SCREENING; LDL GOAL LESS THAN 130: ICD-10-CM

## 2021-02-19 DIAGNOSIS — R07.89 RIGHT-SIDED CHEST WALL PAIN: ICD-10-CM

## 2021-02-19 DIAGNOSIS — Z80.42 FAMILY HISTORY OF PROSTATE CANCER: ICD-10-CM

## 2021-02-19 DIAGNOSIS — Z82.49: ICD-10-CM

## 2021-02-19 LAB
ANION GAP SERPL CALCULATED.3IONS-SCNC: 7 MMOL/L (ref 3–14)
BUN SERPL-MCNC: 23 MG/DL (ref 7–30)
CALCIUM SERPL-MCNC: 9.7 MG/DL (ref 8.5–10.1)
CHLORIDE SERPL-SCNC: 105 MMOL/L (ref 94–109)
CHOLEST SERPL-MCNC: 199 MG/DL
CO2 SERPL-SCNC: 27 MMOL/L (ref 20–32)
CREAT SERPL-MCNC: 0.82 MG/DL (ref 0.66–1.25)
GFR SERPL CREATININE-BSD FRML MDRD: >90 ML/MIN/{1.73_M2}
GLUCOSE SERPL-MCNC: 85 MG/DL (ref 70–99)
HDLC SERPL-MCNC: 93 MG/DL
LDLC SERPL CALC-MCNC: 96 MG/DL
NONHDLC SERPL-MCNC: 106 MG/DL
POTASSIUM SERPL-SCNC: 4 MMOL/L (ref 3.4–5.3)
PSA SERPL-ACNC: 1.9 UG/L (ref 0–4)
SODIUM SERPL-SCNC: 139 MMOL/L (ref 133–144)
TRIGL SERPL-MCNC: 49 MG/DL

## 2021-02-19 PROCEDURE — 99396 PREV VISIT EST AGE 40-64: CPT | Performed by: FAMILY MEDICINE

## 2021-02-19 PROCEDURE — G0103 PSA SCREENING: HCPCS | Performed by: FAMILY MEDICINE

## 2021-02-19 PROCEDURE — 80061 LIPID PANEL: CPT | Performed by: FAMILY MEDICINE

## 2021-02-19 PROCEDURE — 36415 COLL VENOUS BLD VENIPUNCTURE: CPT | Performed by: FAMILY MEDICINE

## 2021-02-19 PROCEDURE — 80048 BASIC METABOLIC PNL TOTAL CA: CPT | Performed by: FAMILY MEDICINE

## 2021-02-19 ASSESSMENT — PAIN SCALES - GENERAL: PAINLEVEL: NO PAIN (1)

## 2021-02-19 ASSESSMENT — MIFFLIN-ST. JEOR: SCORE: 1747.27

## 2021-02-19 NOTE — RESULT ENCOUNTER NOTE
Here are your lab results from your recent visit...  -Your PSA (prostate cancer screening test) was lower then the last check which is good and reassuring.  -Your cholesterol panel looks great with a low LDL (the bad cholesterol) and a high HDL (the good cholesterol).   -Your basic metabolic panel (which includes electrolyte levels, blood sugar level and kidney function tests) looks very good as well.    Please let me know if you have any questions.  Best,   Graeme Edmondson MD

## 2021-03-16 DIAGNOSIS — Z11.59 ENCOUNTER FOR SCREENING FOR OTHER VIRAL DISEASES: ICD-10-CM

## 2021-03-27 ENCOUNTER — IMMUNIZATION (OUTPATIENT)
Dept: NURSING | Facility: CLINIC | Age: 61
End: 2021-03-27
Payer: COMMERCIAL

## 2021-03-27 PROCEDURE — 91301 PR COVID VAC MODERNA 100 MCG/0.5 ML IM: CPT

## 2021-03-27 PROCEDURE — 0011A PR COVID VAC MODERNA 100 MCG/0.5 ML IM: CPT

## 2021-03-29 DIAGNOSIS — Z11.59 ENCOUNTER FOR SCREENING FOR OTHER VIRAL DISEASES: ICD-10-CM

## 2021-03-29 LAB
LABORATORY COMMENT REPORT: NORMAL
SARS-COV-2 RNA RESP QL NAA+PROBE: NEGATIVE
SARS-COV-2 RNA RESP QL NAA+PROBE: NORMAL
SPECIMEN SOURCE: NORMAL
SPECIMEN SOURCE: NORMAL

## 2021-03-29 PROCEDURE — U0005 INFEC AGEN DETEC AMPLI PROBE: HCPCS | Performed by: SPECIALIST

## 2021-03-29 PROCEDURE — U0003 INFECTIOUS AGENT DETECTION BY NUCLEIC ACID (DNA OR RNA); SEVERE ACUTE RESPIRATORY SYNDROME CORONAVIRUS 2 (SARS-COV-2) (CORONAVIRUS DISEASE [COVID-19]), AMPLIFIED PROBE TECHNIQUE, MAKING USE OF HIGH THROUGHPUT TECHNOLOGIES AS DESCRIBED BY CMS-2020-01-R: HCPCS | Performed by: SPECIALIST

## 2021-04-01 ENCOUNTER — HOSPITAL ENCOUNTER (OUTPATIENT)
Facility: CLINIC | Age: 61
Discharge: HOME | End: 2021-04-01
Attending: SPECIALIST | Admitting: SPECIALIST
Payer: COMMERCIAL

## 2021-04-01 VITALS
DIASTOLIC BLOOD PRESSURE: 85 MMHG | WEIGHT: 189 LBS | RESPIRATION RATE: 14 BRPM | HEART RATE: 59 BPM | OXYGEN SATURATION: 99 % | BODY MASS INDEX: 23.5 KG/M2 | HEIGHT: 75 IN | TEMPERATURE: 97.2 F | SYSTOLIC BLOOD PRESSURE: 126 MMHG

## 2021-04-01 LAB — COLONOSCOPY: NORMAL

## 2021-04-01 PROCEDURE — 45378 DIAGNOSTIC COLONOSCOPY: CPT | Performed by: SPECIALIST

## 2021-04-01 PROCEDURE — 99153 MOD SED SAME PHYS/QHP EA: CPT | Performed by: SPECIALIST

## 2021-04-01 PROCEDURE — G0500 MOD SEDAT ENDO SERVICE >5YRS: HCPCS | Performed by: SPECIALIST

## 2021-04-01 PROCEDURE — 250N000011 HC RX IP 250 OP 636: Performed by: SPECIALIST

## 2021-04-01 PROCEDURE — G0121 COLON CA SCRN NOT HI RSK IND: HCPCS | Performed by: SPECIALIST

## 2021-04-01 RX ORDER — FENTANYL CITRATE 50 UG/ML
INJECTION, SOLUTION INTRAMUSCULAR; INTRAVENOUS PRN
Status: COMPLETED | OUTPATIENT
Start: 2021-04-01 | End: 2021-04-01

## 2021-04-01 RX ORDER — LIDOCAINE 40 MG/G
CREAM TOPICAL
Status: DISCONTINUED | OUTPATIENT
Start: 2021-04-01 | End: 2021-04-01 | Stop reason: HOSPADM

## 2021-04-01 RX ORDER — ONDANSETRON 2 MG/ML
4 INJECTION INTRAMUSCULAR; INTRAVENOUS
Status: DISCONTINUED | OUTPATIENT
Start: 2021-04-01 | End: 2021-04-01 | Stop reason: HOSPADM

## 2021-04-01 RX ADMIN — MIDAZOLAM 2 MG: 1 INJECTION INTRAMUSCULAR; INTRAVENOUS at 09:01

## 2021-04-01 RX ADMIN — FENTANYL CITRATE 100 MCG: 50 INJECTION, SOLUTION INTRAMUSCULAR; INTRAVENOUS at 09:04

## 2021-04-01 ASSESSMENT — MIFFLIN-ST. JEOR: SCORE: 1744.99

## 2021-04-01 NOTE — H&P
Pre-Endoscopy History and Physical     Gabe Moon MRN# [id number]   YOB: 1960 Age: 61 year old     Date of Procedure: 4/1/2021  Primary care provider: Beatriz Edmondson  Type of Endoscopy: Colonoscopy with possible biopsy, possible polypectomy  Reason for Procedure: screening  Type of Anesthesia Anticipated: Conscious Sedation    HPI:    Gabe is a 61 year old male who will be undergoing the above procedure.      A history and physical has been performed. The patient's medications and allergies have been reviewed. The risks and benefits of the procedure and the sedation options and risks were discussed with the patient.  All questions were answered and informed consent was obtained.      He denies a personal or family history of anesthesia complications or bleeding disorders.     Patient Active Problem List   Diagnosis   (none) - all problems resolved or deleted        Past Medical History:   Diagnosis Date     Blood in semen      Inguinal hernia of right side with gangrene 2003     Low back pain      Prostate infection      STD (sexually transmitted disease)      Varicose vein of leg         Past Surgical History:   Procedure Laterality Date     ABDOMEN SURGERY  may 1991    testicular varicose vein repair     COLONOSCOPY  May 2013    MRI Virtual Colonoscopy     HERNIA REPAIR  2003    inguinal     LIGATE VEIN      '93 scrotum, '14 left leg     SOFT TISSUE SURGERY  Jan 2002    Lumbar spine issues, facet joint sundrome       Social History     Tobacco Use     Smoking status: Never Smoker     Smokeless tobacco: Never Used   Substance Use Topics     Alcohol use: Yes     Alcohol/week: 0.0 standard drinks       Family History   Problem Relation Age of Onset     Breast Cancer Mother      Alzheimer Disease Mother      Depression Mother      Mental Illness Mother         Bi-Polar, Dementia, passed away age 84     Arrhythmia Mother         fast HR, continued on meds, no blood thinners     Coronary  "Artery Disease Mother      Diabetes Father         Type II     Hypertension Father         managed with Medication     Prostate Cancer Father         diagnosis at age 77, now 88     Osteoarthritis Father         hip replacement (overwt)     Coronary Artery Disease Father         Congestive Heart Failure, diagnonsed      Osteoarthritis Paternal Grandfather         hip replacements     Colon Cancer Maternal Cousin 53         of colon cancer at 58     Prostate Cancer Brother 55     Arrhythmia Brother         ER visit, meds x 10 days     Prostate Cancer Brother         diagnosis 2021, treatment pending       Prior to Admission medications    Medication Sig Start Date End Date Taking? Authorizing Provider   multivitamin, therapeutic with minerals (MULTI-VITAMIN) TABS tablet Take 1 tablet by mouth daily 9/15/17  Yes Beatriz Edmondson MD       No Known Allergies     REVIEW OF SYSTEMS:   5 point ROS negative except as noted above in HPI, including Gen., Resp., CV, GI &  system review.    PHYSICAL EXAM:   BP (!) 135/96   Pulse 72   Temp 97.2  F (36.2  C) (Temporal)   Resp 16   Ht 1.892 m (6' 2.5\")   Wt 85.7 kg (189 lb)   SpO2 100%   BMI 23.94 kg/m   Estimated body mass index is 23.94 kg/m  as calculated from the following:    Height as of this encounter: 1.892 m (6' 2.5\").    Weight as of this encounter: 85.7 kg (189 lb).   GENERAL APPEARANCE: alert, and oriented  MENTAL STATUS: alert  AIRWAY EXAM: Mallampatti Class II (visualization of the soft palate, fauces, and uvula)  RESP: lungs clear to auscultation - no rales, rhonchi or wheezes  CV: regular rates and rhythm  DIAGNOSTICS:    Not indicated    IMPRESSION   ASA Class 1 - Healthy patient, no medical problems    PLAN:   Plan for Colonoscopy with possible biopsy, possible polypectomy. We discussed the risks, benefits and alternatives and the patient wished to proceed.    The above has been forwarded to the consulting provider.      Signed " Electronically by: Gage Mckee MD  April 1, 2021

## 2021-04-24 ENCOUNTER — IMMUNIZATION (OUTPATIENT)
Dept: NURSING | Facility: CLINIC | Age: 61
End: 2021-04-24
Attending: INTERNAL MEDICINE
Payer: COMMERCIAL

## 2021-04-24 PROCEDURE — 91301 PR COVID VAC MODERNA 100 MCG/0.5 ML IM: CPT

## 2021-04-24 PROCEDURE — 0012A PR COVID VAC MODERNA 100 MCG/0.5 ML IM: CPT

## 2021-04-26 ENCOUNTER — TELEPHONE (OUTPATIENT)
Dept: FAMILY MEDICINE | Facility: CLINIC | Age: 61
End: 2021-04-26

## 2021-04-26 ENCOUNTER — OFFICE VISIT (OUTPATIENT)
Dept: FAMILY MEDICINE | Facility: CLINIC | Age: 61
End: 2021-04-26
Payer: COMMERCIAL

## 2021-04-26 VITALS
TEMPERATURE: 97.6 F | HEART RATE: 65 BPM | WEIGHT: 191 LBS | OXYGEN SATURATION: 97 % | RESPIRATION RATE: 16 BRPM | DIASTOLIC BLOOD PRESSURE: 76 MMHG | HEIGHT: 75 IN | BODY MASS INDEX: 23.75 KG/M2 | SYSTOLIC BLOOD PRESSURE: 108 MMHG

## 2021-04-26 DIAGNOSIS — M79.605 PAIN OF LEFT LOWER EXTREMITY: Primary | ICD-10-CM

## 2021-04-26 PROCEDURE — 99213 OFFICE O/P EST LOW 20 MIN: CPT | Performed by: PHYSICIAN ASSISTANT

## 2021-04-26 ASSESSMENT — MIFFLIN-ST. JEOR: SCORE: 1754.06

## 2021-04-26 NOTE — TELEPHONE ENCOUNTER
Patient hd 2 tele-doc visits and they referred him to have an ultrasound on his left knee    They couldn't put orders in for him     Can you do that and call patient to advise when done    [phone] ok to leave message or mychart

## 2021-04-26 NOTE — PROGRESS NOTES
"    Assessment & Plan     Pain of left lower extremity  Will get US to rule out DEEP VEIN THROMBOSIS.  - US Lower Extremity Venous Duplex Left; Future                 Return in about 4 weeks (around 5/24/2021).    TUAN Ibrahim [facility]    Jenni Bernal is a 61 year old who presents for the following health issues     HPI     Musculoskeletal problem/pain  Onset/Duration: 1 week ago   Description  Location: knee - left  Joint Swelling: YES  Redness: no  Pain: YES  Warmth: YES  Intensity:  moderate  Progression of Symptoms:  intermittent  Accompanying signs and symptoms:   Fevers: no  Numbness/tingling/weakness: not sure as he recently received his second covid vaccine   History  Trauma to the area: no  Recent illness:  no  Previous similar problem: YES- 2014 vericrause veins \"got stripped\"  Previous evaluation:  YES- video kaya   Precipitating or alleviating factors:  Aggravating factors include: certain movements   Therapies tried and outcome: pt did a \"teleLiftDNA kaya\" and received an antibiotic last week keflex 500mg oral 4 times a day with an aspirin 325mg     Symptoms improved while on antibiotic, but when he stopped the pain increased.  There was some concern for DEEP VEIN THROMBOSIS.    Review of Systems   Constitutional, HEENT, cardiovascular, pulmonary, gi and gu systems are negative, except as otherwise noted.      Objective    /76   Pulse 65   Temp 97.6  F (36.4  C) (Tympanic)   Resp 16   Ht 1.892 m (6' 2.5\")   Wt 86.6 kg (191 lb)   SpO2 97%   BMI 24.19 kg/m    Body mass index is 24.19 kg/m .  Physical Exam   GENERAL: alert and no distress  RESP: lungs clear to auscultation - no rales, rhonchi or wheezes  MS: mild medial right knee tenderness.  Significant varicose veins, hard to determine what baseline level of edema is.                "

## 2021-04-26 NOTE — TELEPHONE ENCOUNTER
Patient states knee a week ago became swollen   Had video consult on a Saturday night he said   Inflammation - abx Rx'd  Moving - has been going up and down stairs and packing boxes  Flared up again over the weekend- did another video consult  TeleDoc service recommended US   Consults done outside [place]  Advised appt to assess and order imaging if appropriate    Next 5 appointments (look out 90 days)    Apr 26, 2021  2:40 PM  Office Visit with Cuco Clarke DO  [facility] ([facility] ) [address] [facility] [address] [phone]        Marly COONEY, RN

## 2021-04-27 ENCOUNTER — HOSPITAL ENCOUNTER (OUTPATIENT)
Dept: ULTRASOUND IMAGING | Facility: CLINIC | Age: 61
Discharge: HOME | End: 2021-04-27
Attending: PHYSICIAN ASSISTANT | Admitting: PHYSICIAN ASSISTANT
Payer: COMMERCIAL

## 2021-04-27 DIAGNOSIS — M79.605 PAIN OF LEFT LOWER EXTREMITY: ICD-10-CM

## 2021-04-27 PROCEDURE — 93971 EXTREMITY STUDY: CPT | Mod: LT

## 2021-04-27 NOTE — RESULT ENCOUNTER NOTE
Dear Gabe    Your test results are attached, feel free to contact me via MyChart     Great news, no sign of any blood clots in that left leg.      Edvin Dee PA-C

## 2021-05-12 NOTE — PROGRESS NOTES
"    Assessment & Plan     Acute pain of left knee  Ongoing left knee pain and swelling.  Discussed further evaluation options including referral to ortho, would like to proceed with further imaging prior.  - MR Knee Left w/o Contrast; Future                 Return in about 1 week (around 5/20/2021).    TUAN Ibrahim [facility]    Jenni Bernal is a 61 year old who presents for the following health issues     HPI     Musculoskeletal problem/pain  Onset/Duration: 1 month  Description  Location: knee - left  Joint Swelling: YES  Redness: no  Pain: YES  Warmth: YES  Intensity:  moderate  Progression of Symptoms:  same  Accompanying signs and symptoms:   Fevers: no  Numbness/tingling/weakness: no  History  Trauma to the area: no  Recent illness:  no  Previous similar problem: no  Previous evaluation:  no  Precipitating or alleviating factors:  Aggravating factors include: bending  Therapies tried and outcome: nothing and Ibuprofen        Review of Systems   Constitutional, HEENT, cardiovascular, pulmonary, gi and gu systems are negative, except as otherwise noted.      Objective    /62 (BP Location: Right arm, Cuff Size: Adult Regular)   Pulse 70   Temp 97.1  F (36.2  C) (Tympanic)   Resp 16   Ht 1.892 m (6' 2.5\")   Wt 87.5 kg (193 lb)   SpO2 97%   BMI 24.45 kg/m    Body mass index is 24.45 kg/m .  Physical Exam   GENERAL: alert and no distress  EYES: Eyes grossly normal to inspection  RESP: lungs clear to auscultation - no rales, rhonchi or wheezes  CV: regular rate and rhythm, normal S1 S2, no S3 or S4, no murmur, click or rub, no peripheral edema and peripheral pulses strong  MS: full active ROM but pain with full flex.  Mild tender medial joint line and posterior                "

## 2021-05-13 ENCOUNTER — OFFICE VISIT (OUTPATIENT)
Dept: FAMILY MEDICINE | Facility: CLINIC | Age: 61
End: 2021-05-13
Payer: COMMERCIAL

## 2021-05-13 VITALS
SYSTOLIC BLOOD PRESSURE: 117 MMHG | OXYGEN SATURATION: 97 % | WEIGHT: 193 LBS | DIASTOLIC BLOOD PRESSURE: 62 MMHG | HEIGHT: 75 IN | BODY MASS INDEX: 24 KG/M2 | HEART RATE: 70 BPM | TEMPERATURE: 97.1 F | RESPIRATION RATE: 16 BRPM

## 2021-05-13 DIAGNOSIS — M25.562 ACUTE PAIN OF LEFT KNEE: Primary | ICD-10-CM

## 2021-05-13 PROCEDURE — 99213 OFFICE O/P EST LOW 20 MIN: CPT | Performed by: PHYSICIAN ASSISTANT

## 2021-05-13 ASSESSMENT — MIFFLIN-ST. JEOR: SCORE: 1763.13

## 2021-05-13 NOTE — NURSING NOTE
"Chief Complaint   Patient presents with     Knee Pain     left knee since 4/17/21     initial /62 (BP Location: Right arm, Cuff Size: Adult Regular)   Pulse 70   Temp 97.1  F (36.2  C) (Tympanic)   Resp 16   Ht 1.892 m (6' 2.5\")   Wt 87.5 kg (193 lb)   SpO2 97%   BMI 24.45 kg/m   Estimated body mass index is 24.45 kg/m  as calculated from the following:    Height as of this encounter: 1.892 m (6' 2.5\").    Weight as of this encounter: 87.5 kg (193 lb).  BP completed using cuff size: regular.  R arm      Health Maintenance that is potentially due pending provider review:  NONE    n/a    Corbin Peguero ma  "

## 2021-05-25 ENCOUNTER — HOSPITAL ENCOUNTER (OUTPATIENT)
Dept: MRI IMAGING | Facility: CLINIC | Age: 61
Discharge: HOME | End: 2021-05-25
Attending: PHYSICIAN ASSISTANT | Admitting: PHYSICIAN ASSISTANT
Payer: COMMERCIAL

## 2021-05-25 DIAGNOSIS — M25.562 ACUTE PAIN OF LEFT KNEE: ICD-10-CM

## 2021-05-25 PROCEDURE — 73721 MRI JNT OF LWR EXTRE W/O DYE: CPT | Mod: LT

## 2021-05-27 ENCOUNTER — MYC MEDICAL ADVICE (OUTPATIENT)
Dept: FAMILY MEDICINE | Facility: CLINIC | Age: 61
End: 2021-05-27

## 2021-05-27 NOTE — RESULT ENCOUNTER NOTE
Dear Gabe    Your test results are attached, feel free to contact me via [employer]     Overall, this looks like a pretty good MRI.  It appears that you had a cyst in the back of your leg that ruptured, and this is most likely what was going on even initially.  This can take some time to reabsorb which could explain your symptoms.  If your knee/leg continues to give you problems, let me know and I will send you to a knee specialist.    Edvin Dee PA-C

## 2021-06-07 ENCOUNTER — ANCILLARY PROCEDURE (OUTPATIENT)
Dept: GENERAL RADIOLOGY | Facility: CLINIC | Age: 61
End: 2021-06-07
Attending: PHYSICIAN ASSISTANT
Payer: COMMERCIAL

## 2021-06-07 ENCOUNTER — OFFICE VISIT (OUTPATIENT)
Dept: FAMILY MEDICINE | Facility: CLINIC | Age: 61
End: 2021-06-07
Payer: COMMERCIAL

## 2021-06-07 VITALS
TEMPERATURE: 98.4 F | BODY MASS INDEX: 24.05 KG/M2 | HEIGHT: 75 IN | DIASTOLIC BLOOD PRESSURE: 85 MMHG | HEART RATE: 56 BPM | RESPIRATION RATE: 16 BRPM | WEIGHT: 193.4 LBS | OXYGEN SATURATION: 97 % | SYSTOLIC BLOOD PRESSURE: 124 MMHG

## 2021-06-07 DIAGNOSIS — R07.81 PAIN IN RIB: Primary | ICD-10-CM

## 2021-06-07 PROCEDURE — 71101 X-RAY EXAM UNILAT RIBS/CHEST: CPT | Mod: RT | Performed by: RADIOLOGY

## 2021-06-07 PROCEDURE — 99213 OFFICE O/P EST LOW 20 MIN: CPT | Performed by: PHYSICIAN ASSISTANT

## 2021-06-07 PROCEDURE — 71101 X-RAY EXAM UNILAT RIBS/CHEST: CPT | Mod: LT | Performed by: RADIOLOGY

## 2021-06-07 ASSESSMENT — MIFFLIN-ST. JEOR: SCORE: 1764.95

## 2021-06-07 NOTE — PROGRESS NOTES
"    Assessment & Plan     Pain in rib  No sign of rib fracture, supportive care.  - XR Ribs & Chest Right G/E 3 Views  - XR Ribs & Chest Lt 3v                 Return in about 4 weeks (around 7/5/2021) for If symptoms persist or worsen.    Sagar Dee PA-C  [facility] is a 61 year old who presents for the following health issues     HPI     Musculoskeletal problem/pain  Onset/Duration: 3 days  Description  Location: ribs - bilateral  Joint Swelling: no  Redness: YES  Pain: YES  Warmth: YES  Intensity:  Moderate +  Progression of Symptoms:  same  Accompanying signs and symptoms:   Fevers: no  Numbness/tingling/weakness: no  History  Trauma to the area: YES  Recent illness:  no  Previous similar problem: no  Previous evaluation:  no  Precipitating or alleviating factors:  Aggravating factors include: overuse  Therapies tried and outcome: NSAID - naproxen        Review of Systems   Constitutional, HEENT, cardiovascular, pulmonary, gi and gu systems are negative, except as otherwise noted.      Objective    /85   Pulse 56   Temp 98.4  F (36.9  C) (Tympanic)   Resp 16   Ht 1.892 m (6' 2.5\")   Wt 87.7 kg (193 lb 6.4 oz)   SpO2 97%   BMI 24.50 kg/m    Body mass index is 24.5 kg/m .  Physical Exam   GENERAL: alert and no distress  EYES: Eyes grossly normal to inspection  RESP: lungs clear to auscultation - no rales, rhonchi or wheezes  MS: no gross musculoskeletal defects noted, no edema    Results for orders placed or performed in visit on 06/07/21 (from the past 24 hour(s))   XR Ribs & Chest Right G/E 3 Views    Narrative    XR RIGHT RIBS AND CHEST THREE VIEWS, XR LEFT RIBS AND CHEST THREE  VIEWS   6/7/2021 12:16 PM     HISTORY:  Pain in rib    FINDINGS: There appears to be a pectus excavatum deformity      Impression    IMPRESSION:   No rib fracture identified. No pneumothorax.    BILLY HART MD   XR Ribs & Chest Lt 3v    Narrative    XR RIGHT RIBS AND " CHEST THREE VIEWS, XR LEFT RIBS AND CHEST THREE  VIEWS   6/7/2021 12:16 PM     HISTORY:  Pain in rib    FINDINGS: There appears to be a pectus excavatum deformity      Impression    IMPRESSION:   No rib fracture identified. No pneumothorax.    BILLY HART MD

## 2021-06-08 NOTE — RESULT ENCOUNTER NOTE
Dear Gabe    Your test results are attached, feel free to contact me via [facility] E[facility]     Sorry for the delay, but we lost power for most of the afternoon!  The good news is that no sign of any rib fractures.  How are you feeling so far today?    Edvin Dee PA-C

## 2021-07-19 ENCOUNTER — MYC MEDICAL ADVICE (OUTPATIENT)
Dept: FAMILY MEDICINE | Facility: CLINIC | Age: 61
End: 2021-07-19

## 2021-07-20 NOTE — TELEPHONE ENCOUNTER
Agree with your recommendation, and also make sure he takes several deep breaths an hour, even if it is painful.  Rib injuries can take several weeks, so would given another week and let us know.    Edvin Dee PA-C

## 2021-10-09 ENCOUNTER — HEALTH MAINTENANCE LETTER (OUTPATIENT)
Age: 61
End: 2021-10-09

## 2021-11-25 NOTE — TELEPHONE ENCOUNTER
Critical results called from lab, Dr. Juliet Flores aware. Creatinine 7.33 and troponin 0.020.       Corrinne Fells, RN  11/25/21 2723 JS  Please see mychart message  Just have pt to continue to use ibuprofen, self splint when need to cough/deep breath. Should have improvement in next week or two?    Or would like to see?    Thank you,  Lucía Shearer RN

## 2021-12-06 ENCOUNTER — E-VISIT (OUTPATIENT)
Dept: FAMILY MEDICINE | Facility: CLINIC | Age: 61
End: 2021-12-06
Payer: COMMERCIAL

## 2021-12-06 DIAGNOSIS — M54.42 ACUTE BILATERAL LOW BACK PAIN WITH LEFT-SIDED SCIATICA: Primary | ICD-10-CM

## 2021-12-06 PROCEDURE — 99421 OL DIG E/M SVC 5-10 MIN: CPT | Performed by: FAMILY MEDICINE

## 2021-12-06 RX ORDER — CYCLOBENZAPRINE HCL 10 MG
10 TABLET ORAL 3 TIMES DAILY PRN
Qty: 30 TABLET | Refills: 0 | Status: SHIPPED | OUTPATIENT
Start: 2021-12-06 | End: 2022-04-15

## 2021-12-06 NOTE — PATIENT INSTRUCTIONS
"  Patient Education     * Sciatica     Sciatica (\"Lumbar Radiculopathy\") causes a pain that spreads from the lower back down into the buttock, hip and leg. Sometimes leg pain can occur without any back pain. Sciatica is due to irritation or pressure on a spinal nerve as it comes out of the spinal canal. This is most often due to pressure on the nerve from a nearby spinal disk (the cartilage cushion between each spinal bone). Other causes include spinal stenosis (narrowing of the spinal canal) and spasm of the piriformis muscle (a muscle in the buttocks that the sciatic nerve passes through).  Sciatica may begin after a sudden twisting/bending force (such as in a car accident), or sometimes after a simple awkward movement. In either case, muscle spasm is commonly present and can add to the pain.  The diagnosis of sciatica is made from the symptoms and physical exam. Unless you had a physical injury (such as a car accident or fall), X-rays are usually not ordered for the initial evaluation of sciatica because the nerves and disks cannot be seen on an X-ray. Most sciatica (80-90%) gets better with time.  What can I do about my low back pain?  There are three main things you can do to ease low back pain and help it go away.    Stay active! Use positions, movements and exercises. Too much rest can make your symptoms worse.    Use heat or cold packs.    Take medicine as directed.  Using positions, movements and exercises  Research tells us that moving your joints and muscles can help you recover from back pain. Such activity should be simple and gentle.  Use walking to help relieve your discomfort. Try taking a short walk every 3 to 4 hours during the day. Walk for a few minutes inside your home or take longer walks outside, on a treadmill or at a mall. Slowly increase the amount of time you walk. Expect discomfort when you begin, but it should lessen as your back starts to recover.  Finding a position that is " comfortable  When your back pain is new, you may find that certain positions will ease your pain. Gently try each of the following positions until you find one that eases your pain. Once you find a position of comfort, use it as often as you like while you recover. Return to your daily routine as soon as possible.    Lie on your back with your legs bent. You can do this by placing a pillow under your knees or lie on the floor and rest your lower legs on the seat of a chair.    Lie on your side with your knees bent and place a pillow between your knees.    Lie on your stomach over pillows.  Using heat or cold packs  Try cold packs or gentle heat to ease your pain. Use whichever gives you the most relief. Apply the cold pack or heat for 15 minutes at a time, as often as needed.  Taking medicine  If taking over-the-counter medicine:    Take ibuprofen (Advil, Motrin) 600 mg. three times a day as needed for pain.  OR    Take Aleve (naproxen sodium) 220 to 440 mg. two times a day as needed for pain.  If your doctor prescribed medicine, follow the instructions. Stop taking the medicine as soon as you can.  When should I call my doctor?  Your back pain should improve over the first couple of weeks. As it improves, you should be able to return to your normal activities. But call your doctor if:    You have a sudden change in your ability to control?your bladder or bowels.    You begin to feel tingling in your groin or legs.    The pain spreads down your leg and into your foot.    Your toes, feet or leg muscles begin to feel weak.    You feel generally unwell or sick.    Your pain gets worse.  For informational purposes only. Not to replace the advice of your health care provider.  Copyright   2018 [employer]. All rights reserved.           Thank you for choosing us for your care. I have placed an order for a prescription so that you can start treatment. View your full visit summary for details by clicking on  the link below. Your pharmacist will able to address any questions you may have about the medication.      If you're not feeling better within 2-3 weeks please schedule an appointment.  You can schedule an appointment right here in [facility], or call [phone]  If the visit is for the same symptoms as your eVisit, we'll refund the cost of your eVisit if seen within seven days.    Caring for Your Back    You are not alone.    Low back pain is very common. Nearly half of all adults will have low back pain in any given year. The good news is that back pain is rarely a danger to your health. Most people can manage their back pain on their own. About half of people start feeling better within two weeks. In 9 out of 10 cases, low back pain goes away or no longer limits daily activity within 6 weeks.     Your outlook is good!     Your symptoms tell us that your low back pain is most likely not a danger to you. Most of the time we will not know the exact cause of low back pain, even if you see a doctor or have an MRI. However, treatment can still work without knowing the cause of the pain. Less than 1 in 100 people need surgery for their back pain.     What can I do about my low back pain?     There are three basic things you can do to ease low back pain and help it go away.     Use heat or cold packs.    Take medicine as directed.    Use positions, movements and exercises.    Using heat or cold packs:    Try cold packs or gentle heat to ease your pain.  Use whichever gives you the most relief. Apply the cold pack or heat for 15 minutes at a time, as often as needed.    Taking medicine:    If taking over-the-counter medicine:    Take ibuprofen (Advil, Motrin) 600 mg three times a day as needed for pain.  OR    Take Aleve (naproxen) 220 to 440 mg two times a day as needed for pain. If your doctor prescribed a muscle relaxant (cyclobenzaprine 10 mg.):    Take   to 1 tablet at bedtime.    Do not drive when taking this  medicine. This drug may make you sleepy.     Using positions, movements and exercises:    Research tells us that moving your joints and muscles can help you recover from back pain. Such activity should be simple and gentle. Use the positions below as well as walking to help relieve your pain. Try taking a short walk every 3 to 4 hours during the day. Walk for a few minutes inside your home or take longer walks outside, on a treadmill or at a mall. Slowly increase the amount of time you walk. Expect discomfort when you begin, but it should lessen as your back starts to heal. When your back feels better, walk daily to keep your back and body healthy.    Finding a position that is comfortable:    When your back pain is new, certain positions will ease your pain. Gently try each of the positions below until you find one that is helpful. Once you find a position of comfort, use it as often as you like when you are resting. You will recover faster if you combine rest with activity.    * Lie on your back with your legs bent. You can do this by placing a pillow under your knees or lie on the floor and rest your lower legs on the seat of a chair.  * Lie on your side with your knees bent and place a pillow between your knees.    Lie on your stomach over pillows.       When should I call my doctor?    Your back pain should improve over the first couple of weeks. As it improves, you should be able to return to your normal activities.  But call your doctor if:      You have a sudden change in your ability to control your bladder or bowels.    You begin to feel tingling in your groin or legs.    The pain spreads down your leg and into your foot.    Your toes, feet or leg muscles begin to feel weak.    You feel generally unwell or sick.    Your pain gets worse.    If you are deaf or hard of hearing, please let us know. We provide many free services including sign language interpreters, oral interpreters, TTYs, telephone  amplifiers, note takers and written materials.    For informational purposes only. Not to replace the advice of your health care provider. Copyright   2013 [employer]. All rights reserved. [employer] [id number] - 04/13.

## 2021-12-09 ENCOUNTER — THERAPY VISIT (OUTPATIENT)
Dept: PHYSICAL THERAPY | Facility: CLINIC | Age: 61
End: 2021-12-09
Attending: FAMILY MEDICINE
Payer: COMMERCIAL

## 2021-12-09 DIAGNOSIS — G89.29 CHRONIC PAIN OF LEFT ANKLE: ICD-10-CM

## 2021-12-09 DIAGNOSIS — M54.42 ACUTE BILATERAL LOW BACK PAIN WITH LEFT-SIDED SCIATICA: ICD-10-CM

## 2021-12-09 DIAGNOSIS — G89.29 CHRONIC LEFT-SIDED LOW BACK PAIN WITH LEFT-SIDED SCIATICA: ICD-10-CM

## 2021-12-09 DIAGNOSIS — M54.42 CHRONIC LEFT-SIDED LOW BACK PAIN WITH LEFT-SIDED SCIATICA: ICD-10-CM

## 2021-12-09 DIAGNOSIS — M25.572 CHRONIC PAIN OF LEFT ANKLE: ICD-10-CM

## 2021-12-09 PROCEDURE — 97112 NEUROMUSCULAR REEDUCATION: CPT | Mod: GP | Performed by: PHYSICAL THERAPIST

## 2021-12-09 PROCEDURE — 97110 THERAPEUTIC EXERCISES: CPT | Mod: GP | Performed by: PHYSICAL THERAPIST

## 2021-12-09 PROCEDURE — 97161 PT EVAL LOW COMPLEX 20 MIN: CPT | Mod: GP | Performed by: PHYSICAL THERAPIST

## 2021-12-09 NOTE — PROGRESS NOTES
[facility] Initial Evaluation     Present: no    Subjective:  Gabe Moon is a 61 year old male with a lumbar condition. Pt reports that he's been having pain from the lateral left knee down the left leg into the foot that started last year and got flared up again in August. Pain waxes and wanes. Worst with moving from sitting to standing, certain positions, unsure. Denies vague symptoms. Would like to walk pain free, keep up with everything without issue. 4x week gym 3x week cycling currently. Pain is currently better since a few days ago.     Symptoms commenced as a result of: unsure/unknown. Condition occurred in the following environment: unsure. Onset of symptoms: 12/6/21. Location of symptoms: lateral left knee down into the left foot, possibly in the left hip at times too. Pain level on number scale: 1/10. Quality of pain: sharp, stabbing. Associated symptoms: muscle weakness in the left leg. Pain frequency (constant/intermittent): intermittetn. Symptoms are exacerbated by: moving from sitting to standing. Symptoms are relieved by: resting, pain meds. Progression of symptoms since onset (same/better/worse): better. Special tests (x-ray, MRI, CT scan, EMG, bone scan): none. Previous treatment: none. Improvement with previous treatment: none. General health as reported by patient is excellent. Pertinent medical history includes: See Epic. Medical allergies: see Epic. Other pertinent surgeries: see Epic. Current medications: See Epic. Occupation: Sales. Patient is (working in normal job without restrictions/working in normal job with restrictions/working in an alternate job/not working due to present treatment problem): normal with some pain. Primary job tasks: sitting, computer work. Barriers at home/work: None reported by patient. Red flags: None reported by patient.    Objective  Posture: forward head, rounded shoulders    Gait: normal    Screening:  negative    Flexibility: gastroc/soleus tighter on the left    Lumbar Movement Loss Response   Flexion Slightly limited no pain and tolerates well with repeated   Extension Slightly limited no pain and relief with repeated in back(no leg symptoms throughout)   Side bending/glide L Full   Side bending/glide R Full   Rotation L Full   Rotation R Full   Quadrants (if applicable)      Hip PROM/Strength: ROM Full elías pain free, Hip Abduction L 5-/5 R 5-/5    Neurological:    Myotomes L R   L1-2 (hip flexion) 5/5 5/5   L3 (knee extension) 5/5 5/5   L4 (ankle DF) 5/5 5/5   L5 (g. toe ext) 5/5 5/5   S1 (ankle PF or knee flex) 5-/5 4+/5     Dural Signs L R   Slump tighter -   SLR - -     Palpation: unremarkable    Prone Assessment: ALBA no pain or symptoms, pressups near full no pain with repeated     Accessory Motion: CPA L1-L5 unremarkable    Functional Squat and Balance: fair squat, good SLS elías    Other Tests: PF strength R 5-/5, L 4+/5, nerve flosses slightly notices concordant sign and relief with repeated, EV 5/5R 5-/5L    Key Findings  -slight concordant nerve symptoms with Slump, SL PF left, gastroc stretch  Assessment/Plan:    Patient is a 61 year old male with lumbar and left side ankle complaints.    Patient has the following significant findings with corresponding treatment plan.                Diagnosis 1:  Left sided lumbar radiculitis, left ankle pain(nerve)/weakness  Pain -  manual therapy, self management, education and home program  Decreased ROM/flexibility - manual therapy and therapeutic exercise  Decreased strength - therapeutic exercise and therapeutic activities  Impaired muscle performance - neuro re-education  Decreased function - therapeutic activities    Therapy Evaluation Codes:   1) History comprised of:   Personal factors that impact the plan of care:      Time since onset of symptoms.    Comorbidity factors that impact the plan of care are:      None.     Medications impacting care:  None.  2) Examination of Body Systems comprised of:   Body structures and functions that impact the plan of care:      Ankle and Lumbar spine.   Activity limitations that impact the plan of care are:      Lifting, Reading/Computer work, Sitting, Sports, Squatting/kneeling, Stairs, Standing, Walking and Working.  3) Clinical presentation characteristics are:   Stable/Uncomplicated.  4) Decision-Making    Moderate complexity using standardized patient assessment instrument and/or measureable assessment of functional outcome.  Cumulative Therapy Evaluation is: Low complexity.    Previous and current functional limitations:  (See Goal Flow Sheet for this information)    Short term and Long term goals: (See Goal Flow Sheet for this information)     Communication ability:  Patient appears to be able to clearly communicate and understand verbal and written communication and follow directions correctly.  Treatment Explanation - The following has been discussed with the patient:   RX ordered/plan of care  Anticipated outcomes  Possible risks and side effects  This patient would benefit from PT intervention to resume normal activities.   Rehab potential is good.    Frequency:  1 X week, once daily  Duration:  for 6 weeks  Discharge Plan:  Achieve all LTG.  Independent in home treatment program.  Reach maximal therapeutic benefit.    Please refer to the daily flowsheet for treatment today, total treatment time and time spent performing 1:1 timed codes.     Please Contact me with any questions or concerns. Thank you for for patience and cooperation.     Timothy Whitt PT, DPT, [facility]  Physical Therapist  [facility] [place]  [phone]

## 2022-01-12 PROBLEM — G89.29 CHRONIC PAIN OF LEFT ANKLE: Status: RESOLVED | Noted: 2021-12-09 | Resolved: 2022-01-12

## 2022-01-12 PROBLEM — M25.572 CHRONIC PAIN OF LEFT ANKLE: Status: RESOLVED | Noted: 2021-12-09 | Resolved: 2022-01-12

## 2022-01-12 PROBLEM — G89.29 CHRONIC LEFT-SIDED LOW BACK PAIN WITH LEFT-SIDED SCIATICA: Status: RESOLVED | Noted: 2021-12-09 | Resolved: 2022-01-12

## 2022-01-12 PROBLEM — M54.42 CHRONIC LEFT-SIDED LOW BACK PAIN WITH LEFT-SIDED SCIATICA: Status: RESOLVED | Noted: 2021-12-09 | Resolved: 2022-01-12

## 2022-01-12 NOTE — PROGRESS NOTES
Patient seen for one time evaluation and treatment.  Patient did not return for further treatment and current status is unknown.  Please see initial evaluation for further information.    Please Contact me with any questions or concerns. Thank you for for patience and cooperation.     Timothy Whitt PT, DPT, [facility]  Physical Therapist  [facility] [place]  [phone]

## 2022-03-20 ENCOUNTER — HEALTH MAINTENANCE LETTER (OUTPATIENT)
Age: 62
End: 2022-03-20

## 2022-04-13 NOTE — PROGRESS NOTES
SUBJECTIVE:   CC: Gabe Moon is an 62 year old male who presents for preventative health visit.         Healthy Habits:     Getting at least 3 servings of Calcium per day:  Yes    Bi-annual eye exam:  Yes    Dental care twice a year:  Yes    Sleep apnea or symptoms of sleep apnea:  None    Diet:  Regular (no restrictions)    Frequency of exercise:  6-7 days/week    Duration of exercise:  45-60 minutes    Taking medications regularly:  Yes    Medication side effects:  Not applicable and None    PHQ-2 Total Score: 0    Additional concerns today:  Yes       The 10-year ASCVD risk score (Doe RIVAS Jr., et al., 2013) is: 6.1%    Values used to calculate the score:      Age: 62 years      Sex: Male      Is Non- : No      Diabetic: No      Tobacco smoker: No      Systolic Blood Pressure: 128 mmHg      Is BP treated: No      HDL Cholesterol: 93 mg/dL      Total Cholesterol: 199 mg/dL        PROBLEMS TO ADD ON...    Today's PHQ-2 Score:   PHQ-2 ( 1999 Pfizer) 4/14/2022   Q1: Little interest or pleasure in doing things 0   Q2: Feeling down, depressed or hopeless 0   PHQ-2 Score 0   PHQ-2 Total Score (12-17 Years)- Positive if 3 or more points; Administer PHQ-A if positive -   Q1: Little interest or pleasure in doing things Not at all   Q2: Feeling down, depressed or hopeless Not at all   PHQ-2 Score 0       Abuse: Current or Past(Physical, Sexual or Emotional)- No  Do you feel safe in your environment? Yes        Social History     Tobacco Use     Smoking status: Never Smoker     Smokeless tobacco: Never Used   Substance Use Topics     Alcohol use: Yes     Alcohol/week: 0.0 standard drinks     If you drink alcohol do you typically have >3 drinks per day or >7 drinks per week? No       AUDIT - Alcohol Use Disorders Identification Test - Reproduced from the World Health Organization Audit 2001 (Second Edition) 4/14/2022   1.  How often do you have a drink containing alcohol? 2 to 3 times a week   2.   How many drinks containing alcohol do you have on a typical day when you are drinking? 3 or 4   3.  How often do you have five or more drinks on one occasion? Never   4.  How often during the last year have you found that you were not able to stop drinking once you had started? Never   5.  How often during the last year have you failed to do what was normally expected of you because of drinking? Never   6.  How often during the last year have you needed a first drink in the morning to get yourself going after a heavy drinking session? Never   7.  How often during the last year have you had a feeling of guilt or remorse after drinking? Never   8.  How often during the last year have you been unable to remember what happened the night before because of your drinking? Never   9.  Have you or someone else been injured because of your drinking? No   10. Has a relative, friend, doctor or other health care worker been concerned about your drinking or suggested you cut down? Yes, during the last year   TOTAL SCORE 8       Last PSA:   PSA   Date Value Ref Range Status   02/19/2021 1.90 0 - 4 ug/L Final     Comment:     Assay Method:  Chemiluminescence using Siemens Vista analyzer       Reviewed orders with patient. Reviewed health maintenance and updated orders accordingly - Yes  Labs reviewed in EPIC    Reviewed and updated as needed this visit by clinical staff   Tobacco  Allergies  Meds  Problems  Med Hx  Surg Hx  Fam Hx            Reviewed and updated as needed this visit by Provider   Tobacco  Allergies  Meds  Problems  Med Hx  Surg Hx  Fam Hx               Review of Systems  CONSTITUTIONAL: NEGATIVE for fever, chills, change in weight  INTEGUMENTARY/SKIN: NEGATIVE for worrisome rashes, moles or lesions  EYES: NEGATIVE for vision changes or irritation  ENT: NEGATIVE for ear, mouth and throat problems  RESP: NEGATIVE for significant cough or SOB  CV: NEGATIVE for chest pain, palpitations or peripheral  "edema  GI: NEGATIVE for nausea, abdominal pain, heartburn, or change in bowel habits   male: negative for dysuria, hematuria, decreased urinary stream, erectile dysfunction, urethral discharge  MUSCULOSKELETAL: NEGATIVE for significant arthralgias or myalgia  NEURO: NEGATIVE for weakness, dizziness or paresthesias  PSYCHIATRIC: NEGATIVE for changes in mood or affect    OBJECTIVE:   /84   Pulse 55   Temp 98.5  F (36.9  C) (Tympanic)   Resp 16   Ht 1.867 m (6' 1.5\")   Wt 87.1 kg (192 lb)   SpO2 99%   BMI 24.99 kg/m      Physical Exam  GENERAL: healthy, alert and no distress  EYES: Eyes grossly normal to inspection, PERRL and conjunctivae and sclerae normal  HENT: ear canals and TM's normal, nose and mouth without ulcers or lesions  NECK: no adenopathy, no asymmetry, masses, or scars and thyroid normal to palpation  RESP: lungs clear to auscultation - no rales, rhonchi or wheezes  CV: regular rate and rhythm, normal S1 S2, no S3 or S4, no murmur, click or rub, no peripheral edema and peripheral pulses strong  ABDOMEN: soft, nontender, no hepatosplenomegaly, no masses and bowel sounds normal  MS: no gross musculoskeletal defects noted, no edema  SKIN: no suspicious lesions or rashes  NEURO: Normal strength and tone, mentation intact and speech normal  PSYCH: mentation appears normal, affect normal/bright    Diagnostic Test Results:  future    ASSESSMENT/PLAN:   (Z00.00) Routine general medical examination at a health care facility  (primary encounter diagnosis)  Comment:    Plan: Lipid panel reflex to direct LDL Fasting,         Comprehensive metabolic panel (BMP + Alb, Alk         Phos, ALT, AST, Total. Bili, TP), PSA, screen             (M25.551,  M25.552) Bilateral hip pain  Comment:    Plan: Physical Therapy Referral             (Z80.42) Family history of prostate cancer  Comment:    Plan:      (Z23) COVID-19 vaccine administered  Comment:  Second booster dose  Plan: COVID-19,PF,MODERNA (18+ YRS " "BOOSTER .25ML)             (Z12.83) Skin exam, screening for cancer  Comment:    Plan: Adult Dermatology Referral                   COUNSELING:   Reviewed preventive health counseling, as reflected in patient instructions    Estimated body mass index is 24.99 kg/m  as calculated from the following:    Height as of this encounter: 1.867 m (6' 1.5\").    Weight as of this encounter: 87.1 kg (192 lb).         He reports that he has never smoked. He has never used smokeless tobacco.      Counseling Resources:  ATP IV Guidelines  Pooled Cohorts Equation Calculator  FRAX Risk Assessment  ICSI Preventive Guidelines  Dietary Guidelines for Americans, 2010  USDA's MyPlate  ASA Prophylaxis  Lung CA Screening    Eden Lowe, [facility]  "

## 2022-04-14 ASSESSMENT — ENCOUNTER SYMPTOMS
PALPITATIONS: 0
HEMATOCHEZIA: 0
ARTHRALGIAS: 1
MYALGIAS: 0
DIZZINESS: 0
DYSURIA: 0
DIARRHEA: 0
NAUSEA: 0
EYE PAIN: 0
SORE THROAT: 0
HEADACHES: 0
WEAKNESS: 0
SHORTNESS OF BREATH: 0
JOINT SWELLING: 0
HEMATURIA: 0
CHILLS: 0
CONSTIPATION: 0
COUGH: 0
ABDOMINAL PAIN: 0
PARESTHESIAS: 0
NERVOUS/ANXIOUS: 0
FREQUENCY: 0
HEARTBURN: 0
FEVER: 0

## 2022-04-15 ENCOUNTER — OFFICE VISIT (OUTPATIENT)
Dept: FAMILY MEDICINE | Facility: CLINIC | Age: 62
End: 2022-04-15
Payer: COMMERCIAL

## 2022-04-15 VITALS
WEIGHT: 192 LBS | RESPIRATION RATE: 16 BRPM | HEIGHT: 74 IN | TEMPERATURE: 98.5 F | BODY MASS INDEX: 24.64 KG/M2 | HEART RATE: 55 BPM | OXYGEN SATURATION: 99 % | SYSTOLIC BLOOD PRESSURE: 128 MMHG | DIASTOLIC BLOOD PRESSURE: 84 MMHG

## 2022-04-15 DIAGNOSIS — Z23 COVID-19 VACCINE ADMINISTERED: ICD-10-CM

## 2022-04-15 DIAGNOSIS — M25.552 BILATERAL HIP PAIN: ICD-10-CM

## 2022-04-15 DIAGNOSIS — M25.551 BILATERAL HIP PAIN: ICD-10-CM

## 2022-04-15 DIAGNOSIS — Z00.00 ROUTINE GENERAL MEDICAL EXAMINATION AT A HEALTH CARE FACILITY: Primary | ICD-10-CM

## 2022-04-15 DIAGNOSIS — Z12.83 SKIN EXAM, SCREENING FOR CANCER: ICD-10-CM

## 2022-04-15 DIAGNOSIS — Z80.42 FAMILY HISTORY OF PROSTATE CANCER: ICD-10-CM

## 2022-04-15 PROCEDURE — 99396 PREV VISIT EST AGE 40-64: CPT | Performed by: FAMILY MEDICINE

## 2022-04-15 PROCEDURE — 91306 COVID-19,PF,MODERNA (18+ YRS BOOSTER .25ML): CPT | Performed by: FAMILY MEDICINE

## 2022-04-15 PROCEDURE — 0064A COVID-19,PF,MODERNA (18+ YRS BOOSTER .25ML): CPT | Performed by: FAMILY MEDICINE

## 2022-04-15 NOTE — NURSING NOTE
"Chief Complaint   Patient presents with     Physical     initial /84   Pulse 55   Temp 98.5  F (36.9  C) (Tympanic)   Resp 16   Ht 1.867 m (6' 1.5\")   Wt 87.1 kg (192 lb)   SpO2 99%   BMI 24.99 kg/m   Estimated body mass index is 24.99 kg/m  as calculated from the following:    Height as of this encounter: 1.867 m (6' 1.5\").    Weight as of this encounter: 87.1 kg (192 lb).  BP completed using cuff size: regular.  L  arm      Health Maintenance that is potentially due pending provider review:  NONE    n/a    Corbin Peguero ma  "

## 2022-04-18 ENCOUNTER — LAB (OUTPATIENT)
Dept: LAB | Facility: CLINIC | Age: 62
End: 2022-04-18
Payer: COMMERCIAL

## 2022-04-18 DIAGNOSIS — Z00.00 ROUTINE GENERAL MEDICAL EXAMINATION AT A HEALTH CARE FACILITY: ICD-10-CM

## 2022-04-18 LAB
ALBUMIN SERPL-MCNC: 4 G/DL (ref 3.4–5)
ALP SERPL-CCNC: 55 U/L (ref 40–150)
ALT SERPL W P-5'-P-CCNC: 31 U/L (ref 0–70)
ANION GAP SERPL CALCULATED.3IONS-SCNC: 4 MMOL/L (ref 3–14)
AST SERPL W P-5'-P-CCNC: 21 U/L (ref 0–45)
BILIRUB SERPL-MCNC: 0.7 MG/DL (ref 0.2–1.3)
BUN SERPL-MCNC: 21 MG/DL (ref 7–30)
CALCIUM SERPL-MCNC: 9.9 MG/DL (ref 8.5–10.1)
CHLORIDE BLD-SCNC: 103 MMOL/L (ref 94–109)
CHOLEST SERPL-MCNC: 178 MG/DL
CO2 SERPL-SCNC: 30 MMOL/L (ref 20–32)
CREAT SERPL-MCNC: 0.81 MG/DL (ref 0.66–1.25)
FASTING STATUS PATIENT QL REPORTED: YES
GFR SERPL CREATININE-BSD FRML MDRD: >90 ML/MIN/1.73M2
GLUCOSE BLD-MCNC: 96 MG/DL (ref 70–99)
HDLC SERPL-MCNC: 81 MG/DL
LDLC SERPL CALC-MCNC: 85 MG/DL
NONHDLC SERPL-MCNC: 97 MG/DL
POTASSIUM BLD-SCNC: 4.1 MMOL/L (ref 3.4–5.3)
PROT SERPL-MCNC: 7.4 G/DL (ref 6.8–8.8)
PSA SERPL-MCNC: 2.66 UG/L (ref 0–4)
SODIUM SERPL-SCNC: 137 MMOL/L (ref 133–144)
TRIGL SERPL-MCNC: 58 MG/DL

## 2022-04-18 PROCEDURE — G0103 PSA SCREENING: HCPCS

## 2022-04-18 PROCEDURE — 80053 COMPREHEN METABOLIC PANEL: CPT

## 2022-04-18 PROCEDURE — 36415 COLL VENOUS BLD VENIPUNCTURE: CPT

## 2022-04-18 PROCEDURE — 80061 LIPID PANEL: CPT

## 2022-04-18 NOTE — RESULT ENCOUNTER NOTE
Dear Gabe,   Your test results are all back -   -PSA (prostate specific antigen) test is normal.  This indicates a low likelihood of prostate cancer.  ADVISE: rechecking this in 1 year.  -Cholesterol levels (LDL,HDL, Triglycerides) are normal.  ADVISE: rechecking in 1 year.   -Liver and gallbladder tests (ALT,AST, Alk phos,bilirubin) are normal.  -Kidney function (GFR) is normal.  -Sodium is normal.  -Potassium is normal.  -Calcium is normal.  -Glucose (diabetic screening test) is normal.  Let us know if you have any questions.  -Eden Lowe, DO

## 2022-04-25 ENCOUNTER — THERAPY VISIT (OUTPATIENT)
Dept: PHYSICAL THERAPY | Facility: CLINIC | Age: 62
End: 2022-04-25
Attending: FAMILY MEDICINE
Payer: COMMERCIAL

## 2022-04-25 DIAGNOSIS — M25.551 HIP PAIN, RIGHT: ICD-10-CM

## 2022-04-25 DIAGNOSIS — M25.552 BILATERAL HIP PAIN: ICD-10-CM

## 2022-04-25 DIAGNOSIS — M25.552 HIP PAIN, LEFT: ICD-10-CM

## 2022-04-25 DIAGNOSIS — M25.551 BILATERAL HIP PAIN: ICD-10-CM

## 2022-04-25 PROCEDURE — 97161 PT EVAL LOW COMPLEX 20 MIN: CPT | Mod: GP | Performed by: PHYSICAL THERAPIST

## 2022-04-25 PROCEDURE — 97110 THERAPEUTIC EXERCISES: CPT | Mod: GP | Performed by: PHYSICAL THERAPIST

## 2022-04-25 ASSESSMENT — ACTIVITIES OF DAILY LIVING (ADL)
HOW_WOULD_YOU_RATE_YOUR_CURRENT_LEVEL_OF_FUNCTION_DURING_YOUR_USUAL_ACTIVITIES_OF_DAILY_LIVING_FROM_0_TO_100_WITH_100_BEING_YOUR_LEVEL_OF_FUNCTION_PRIOR_TO_YOUR_HIP_PROBLEM_AND_0_BEING_THE_INABILITY_TO_PERFORM_ANY_OF_YOUR_USUAL_DAILY_ACTIVITIES?: 97
HOS_ADL_ITEM_SCORE_TOTAL: 58
GETTING_INTO_AND_OUT_OF_AN_AVERAGE_CAR: NO DIFFICULTY AT ALL
STANDING_FOR_15_MINUTES: NO DIFFICULTY AT ALL
GOING_UP_1_FLIGHT_OF_STAIRS: SLIGHT DIFFICULTY
PUTTING_ON_SOCKS_AND_SHOES: NO DIFFICULTY AT ALL
WALKING_DOWN_STEEP_HILLS: NO DIFFICULTY AT ALL
WALKING_APPROXIMATELY_10_MINUTES: SLIGHT DIFFICULTY
DEEP_SQUATTING: NO DIFFICULTY AT ALL
SITTING_FOR_15_MINUTES: SLIGHT DIFFICULTY
HOS_ADL_SCORE(%): 90.63
LIGHT_TO_MODERATE_WORK: SLIGHT DIFFICULTY
HOS_ADL_HIGHEST_POTENTIAL_SCORE: 64
ROLLING_OVER_IN_BED: NO DIFFICULTY AT ALL
HOS_ADL_COUNT: 16
RECREATIONAL_ACTIVITIES: SLIGHT DIFFICULTY
TWISTING/PIVOTING_ON_INVOLVED_LEG: NO DIFFICULTY AT ALL
STEPPING_UP_AND_DOWN_CURBS: NO DIFFICULTY AT ALL
GETTING_INTO_AND_OUT_OF_A_BATHTUB: NO DIFFICULTY AT ALL
GOING_DOWN_1_FLIGHT_OF_STAIRS: NO DIFFICULTY AT ALL
WALKING_INITIALLY: SLIGHT DIFFICULTY
HEAVY_WORK: NO DIFFICULTY AT ALL
WALKING_UP_STEEP_HILLS: SLIGHT DIFFICULTY

## 2022-04-25 NOTE — PROGRESS NOTES
Answers for HPI/ROS submitted by the patient on 4/24/2022  Reason for Visit:: Left and right hip pain  When problem began:: 2/25/2022  How problem occurred:: Unknown  Number scale: 2/10  General health as reported by patient: excellent  Please check all that apply to your current or past medical history: none  Medical allergies: none  Medications you are currently taking: none  Occupation::   What are your primary job tasks: computer work, driving, prolonged sitting  [facility] Initial Evaluation     Present: no    Subjective:  Gabe Moon is a 62 year old male with complaints of elías hip . Pt reports that he started getting pain in both hips in Feb without known cause. Chief complaint is elías hip pain with sitting and certain positions. Denies vague symptoms. Would like to keep up with full level of function pain free, be able to increase activity throughout the summer without issue.     Symptoms commenced as a result of: unsure. Condition occurred in the following environment: unsure. Onset of symptoms: 2/25/22. Location of symptoms: anterior left hip, lateral right hip/glute. Pain level on number scale: 2/10. Quality of pain: sharp painful snapping/clicking left, and dull ache right hip. Associated symptoms: none. Pain frequency (constant/intermittent): intermittent. Symptoms are exacerbated by: sitting, certain positions, walking. Symptoms are relieved by: ibuprofen, stretching. Progression of symptoms since onset: same. Imaging: none. Previous treatment: none. Response to previous treatment: none. General health as reported by patient is excellent. Pertinent medical history includes: See Epic. Medical allergies: see Epic. Other pertinent surgeries: see Epic. Current medications: See Epic. Occupation: . Work/restriction status: normal. Primary job tasks: sitting, computer work. Barriers at home/work: None reported by patient. Red flags: None reported  by patient.    Objective  Gait:normal    Screening: negative lumbar screen tolerates extension well.     Flexibility: unremarkable    Hip PROM (* = pain) Right Left    100   EXT 10 10   ER 45 45   IR 15 15     Hip Strength (* = pain) Right Left   FL 5/5 5-/5   EXT 4+/5 4+/5   ABD 4/5 4/5   ADD 4/5 4/5     Special Tests Right Left   FADIR - -   DIONNA - -   Trendelenburg's Sign - -   Jose Test - -   Murali's Test Good stretch (tight TFL/quad/hip flexor) Good stretch (tight TFL/quad/hip flexor)     Palpation tenderness: left oblique insertion mildly tender    Function: fair squat with limited depth, good SLS elías     Key Findings  Elías hip pain with negative special tests, slightly decreased hip strength/flexibiltiy elías. Decreased core strength/specifically the left lateral oblique.  Assessment/Plan:    Patient is a 62 year old male with both sides hip complaints.    Patient has the following significant findings with corresponding treatment plan.                Diagnosis 1:  Acute elías hip pain  Pain -  manual therapy, self management, education and home program  Decreased ROM/flexibility - manual therapy and therapeutic exercise  Decreased joint mobility - manual therapy and therapeutic exercise  Decreased strength - therapeutic exercise and therapeutic activities  Impaired muscle performance - neuro re-education  Decreased function - therapeutic activities    Therapy Evaluation Codes:     1) History comprised of:   Personal factors that impact the plan of care:      None.    Comorbidity factors that impact the plan of care are:      None.     Medications impacting care: None.  2) Examination of Body Systems comprised of:   Body structures and functions that impact the plan of care:      Hip.   Activity limitations that impact the plan of care are:      Lifting, Sitting, Sports, Squatting/kneeling and Stairs.  3) Clinical presentation characteristics are:   Stable/Uncomplicated.  4) Decision-Making    Low  complexity using standardized patient assessment instrument and/or measureable assessment of functional outcome.    Cumulative Therapy Evaluation is: Low complexity.    Previous and current functional limitations:  (See Goal Flow Sheet for this information)    Short term and Long term goals: (See Goal Flow Sheet for this information)     Communication ability:  Patient appears to be able to clearly communicate and understand verbal and written communication and follow directions correctly.  Treatment Explanation - The following has been discussed with the patient:   RX ordered/plan of care  Anticipated outcomes  Possible risks and side effects  This patient would benefit from PT intervention to resume normal activities.   Rehab potential is good.    Frequency:  1 X week, once daily  Duration:  for 8 weeks  Discharge Plan:  Achieve all LTG.  Independent in home treatment program.  Reach maximal therapeutic benefit.    Please refer to the daily flowsheet for treatment today, total treatment time and time spent performing 1:1 timed codes.     Please Contact me with any questions or concerns. Thank you for for patience and cooperation.     Timothy Whitt PT, DPT, [facility]  Physical Therapist  [place] [facility] [place]  [phone]

## 2022-04-28 ENCOUNTER — OFFICE VISIT (OUTPATIENT)
Dept: FAMILY MEDICINE | Facility: CLINIC | Age: 62
End: 2022-04-28
Payer: COMMERCIAL

## 2022-04-28 VITALS — SYSTOLIC BLOOD PRESSURE: 112 MMHG | DIASTOLIC BLOOD PRESSURE: 70 MMHG

## 2022-04-28 DIAGNOSIS — L81.4 LENTIGINES: ICD-10-CM

## 2022-04-28 DIAGNOSIS — L82.1 SEBORRHEIC KERATOSES: ICD-10-CM

## 2022-04-28 DIAGNOSIS — Z12.83 ENCOUNTER FOR SCREENING FOR MALIGNANT NEOPLASM OF SKIN: ICD-10-CM

## 2022-04-28 DIAGNOSIS — D22.9 MULTIPLE BENIGN NEVI: Primary | ICD-10-CM

## 2022-04-28 DIAGNOSIS — D18.01 CHERRY ANGIOMA: ICD-10-CM

## 2022-04-28 DIAGNOSIS — L82.0 INFLAMED SEBORRHEIC KERATOSIS: ICD-10-CM

## 2022-04-28 PROCEDURE — 99203 OFFICE O/P NEW LOW 30 MIN: CPT | Mod: 25 | Performed by: NURSE PRACTITIONER

## 2022-04-28 PROCEDURE — 17110 DESTRUCTION B9 LES UP TO 14: CPT | Performed by: NURSE PRACTITIONER

## 2022-04-28 NOTE — PROGRESS NOTES
[facility] Dermatology Note  Encounter Date: Apr 28, 2022  Office Visit     Reviewed patients past medical history and pertinent chart review prior to patients visit today.     Dermatology Problem List:  1. Patient denies personal history of skin cancer or dysplastic nevi.    Patient denies family history of skin cancer or dysplastic nevi.      ____________________________________________    Assessment & Plan:     -Irritated and inflamed Seborrheic Keratosis. Left and right neckline, and right temporal scalp. Discussed treatment options with patient including no treatment, cryotherapy, and shave removal. Patient prefers cryotherapy today due to irritation and itching. After verbal consent and discussion of risks and benefits including but no limited to dyspigmentation/scar, blister, and pain, 3 was(were) treated with 1-2mm freeze border for 2 cycles with liquid nitrogen. Post cryotherapy instructions were provided.         # Benign skin findings including: seborrheic keratoses, cherry angioma, lentigines and benign nevi.   - No further intervention required. Patient to report changes.   - Patient reassured of the benign nature of these lesions.    #Signs and Symptoms of non-melanoma skin cancer and ABCDEs of melanoma reviewed with patient. Patient encouraged to perform monthly self skin exams and educated on how to perform them. UV precautions reviewed with patient. Patient was asked about new or changing moles/lesions on body.     #Reviewed Sunscreen: Apply 20 minutes prior to going outdoors and reapply every two hours, when wet or sweating. We recommend using an SPF 30 or higher, and to use one that is water resistant.       Follow-up:  1-2 years for follow up full body skin exam, prn for new or changing lesions or new concerns    Alka Krishnamurthy, APRN CNP on 4/28/2022 at 11:30 AM    ____________________________________________    CC: Skin Check    HPI:  Mr. Gabe Moon is a(n) 62 year  old male who presents today as a new patient for a full body skin cancer screening. Patient has concerns today about a few spots. He has a spot on his right temple over the past 1 year, it has been getting larger and darker over the past 1 year.  It is asymptomatic.  There is a spot on the left thigh that grew and then flaked off, there is a spot there but it is not raised anymore. He also has a spot on the right scalp that has been treated with liquid nitrogen in the past. It is no longer bothering him but he would like it looked at. There is a spot on the left shoulder on the back that he would like checked. It is darker and larger than most of his spots, he has not noticed any changes. He also has some skin tags on the neckline that rub on tshirts and get irritated. .     Patient is otherwise feeling well, without additional skin concerns.     Physical Exam:  Vitals: /70   SKIN: Full skin, which includes the head/face, both arms, chest, back, abdomen,both legs, genitalia and/or groin buttocks, digits and/or nails, was examined.  -several 1-2mm red dome shaped symmetric papules scattered on the trunk  -multiple tan/brown flat round macules and raised papules scattered throughout trunk, extremities and head. No worrisome features for malignancy noted on examination.  -scattered tan, homogenous macules scattered on sun exposed areas of trunk, extremities and face.   -scattered waxy, stuck on tan/brown papules and patches on the trunk     - No other lesions of concern on areas examined.     Medications:  Current Outpatient Medications   Medication     multivitamin, therapeutic with minerals (MULTI-VITAMIN) TABS tablet     No current facility-administered medications for this visit.      Past Medical History:   Patient Active Problem List   Diagnosis     Family history of prostate cancer     Hip pain, right     Hip pain, left     Past Medical History:   Diagnosis Date     Blood in semen      Inguinal hernia of  right side with gangrene 2003     Low back pain      Prostate infection      STD (sexually transmitted disease)      Varicose vein of leg        CC Eden Lowe,   [address] [id number] on close of this encounter.

## 2022-04-28 NOTE — LETTER
4/28/2022         RE: Gabe Moon  [address] [id number]        Dear Colleague,    Thank you for referring your patient, Gabe Moon, to the [facility]. Please see a copy of my visit note below.    [facility] Dermatology Note  Encounter Date: Apr 28, 2022  Office Visit     Reviewed patients past medical history and pertinent chart review prior to patients visit today.     Dermatology Problem List:  1. Patient denies personal history of skin cancer or dysplastic nevi.    Patient denies family history of skin cancer or dysplastic nevi.      ____________________________________________    Assessment & Plan:     -Irritated and inflamed Seborrheic Keratosis. Left and right neckline, and right temporal scalp. Discussed treatment options with patient including no treatment, cryotherapy, and shave removal. Patient prefers cryotherapy today due to irritation and itching. After verbal consent and discussion of risks and benefits including but no limited to dyspigmentation/scar, blister, and pain, 3 was(were) treated with 1-2mm freeze border for 2 cycles with liquid nitrogen. Post cryotherapy instructions were provided.         # Benign skin findings including: seborrheic keratoses, cherry angioma, lentigines and benign nevi.   - No further intervention required. Patient to report changes.   - Patient reassured of the benign nature of these lesions.    #Signs and Symptoms of non-melanoma skin cancer and ABCDEs of melanoma reviewed with patient. Patient encouraged to perform monthly self skin exams and educated on how to perform them. UV precautions reviewed with patient. Patient was asked about new or changing moles/lesions on body.     #Reviewed Sunscreen: Apply 20 minutes prior to going outdoors and reapply every two hours, when wet or sweating. We recommend using an SPF 30 or higher, and to use one that is water resistant.        Follow-up:  1-2 years for follow up full body skin exam, prn for new or changing lesions or new concerns    Alka Krishnamurthy, APRN CNP on 4/28/2022 at 11:30 AM    ____________________________________________    CC: Skin Check    HPI:  Mr. Gabe Moon is a(n) 62 year old male who presents today as a new patient for a full body skin cancer screening. Patient has concerns today about a few spots. He has a spot on his right temple over the past 1 year, it has been getting larger and darker over the past 1 year.  It is asymptomatic.  There is a spot on the left thigh that grew and then flaked off, there is a spot there but it is not raised anymore. He also has a spot on the right scalp that has been treated with liquid nitrogen in the past. It is no longer bothering him but he would like it looked at. There is a spot on the left shoulder on the back that he would like checked. It is darker and larger than most of his spots, he has not noticed any changes. He also has some skin tags on the neckline that rub on tshirts and get irritated. .     Patient is otherwise feeling well, without additional skin concerns.     Physical Exam:  Vitals: /70   SKIN: Full skin, which includes the head/face, both arms, chest, back, abdomen,both legs, genitalia and/or groin buttocks, digits and/or nails, was examined.  -several 1-2mm red dome shaped symmetric papules scattered on the trunk  -multiple tan/brown flat round macules and raised papules scattered throughout trunk, extremities and head. No worrisome features for malignancy noted on examination.  -scattered tan, homogenous macules scattered on sun exposed areas of trunk, extremities and face.   -scattered waxy, stuck on tan/brown papules and patches on the trunk     - No other lesions of concern on areas examined.     Medications:  Current Outpatient Medications   Medication     multivitamin, therapeutic with minerals (MULTI-VITAMIN) TABS tablet     No current  facility-administered medications for this visit.      Past Medical History:   Patient Active Problem List   Diagnosis     Family history of prostate cancer     Hip pain, right     Hip pain, left     Past Medical History:   Diagnosis Date     Blood in semen      Inguinal hernia of right side with gangrene 2003     Low back pain      Prostate infection      STD (sexually transmitted disease)      Varicose vein of leg        CC Eden Lowe DO  [address] [facility] [facility]  [address]  [place] [address] on close of this encounter.      Again, thank you for allowing me to participate in the care of your patient.        Sincerely,        MARY Núñez CNP

## 2022-05-02 ENCOUNTER — THERAPY VISIT (OUTPATIENT)
Dept: PHYSICAL THERAPY | Facility: CLINIC | Age: 62
End: 2022-05-02
Payer: COMMERCIAL

## 2022-05-02 DIAGNOSIS — M25.551 HIP PAIN, RIGHT: Primary | ICD-10-CM

## 2022-05-02 DIAGNOSIS — M25.552 HIP PAIN, LEFT: ICD-10-CM

## 2022-05-02 PROCEDURE — 97110 THERAPEUTIC EXERCISES: CPT | Mod: GP | Performed by: PHYSICAL THERAPIST

## 2022-05-02 ASSESSMENT — ACTIVITIES OF DAILY LIVING (ADL)
RECREATIONAL_ACTIVITIES: SLIGHT DIFFICULTY
ROLLING_OVER_IN_BED: NO DIFFICULTY AT ALL
TWISTING/PIVOTING_ON_INVOLVED_LEG: NO DIFFICULTY AT ALL
HOS_ADL_HIGHEST_POTENTIAL_SCORE: 68
HOS_ADL_COUNT: 17
LIGHT_TO_MODERATE_WORK: SLIGHT DIFFICULTY
GOING_UP_1_FLIGHT_OF_STAIRS: NO DIFFICULTY AT ALL
HEAVY_WORK: NO DIFFICULTY AT ALL
HOS_ADL_SCORE(%): 92.65
STEPPING_UP_AND_DOWN_CURBS: NO DIFFICULTY AT ALL
WALKING_INITIALLY: NO DIFFICULTY AT ALL
HOS_ADL_ITEM_SCORE_TOTAL: 63
WALKING_15_MINUTES_OR_GREATER: SLIGHT DIFFICULTY
GETTING_INTO_AND_OUT_OF_A_BATHTUB: NO DIFFICULTY AT ALL
SITTING_FOR_15_MINUTES: NO DIFFICULTY AT ALL
PUTTING_ON_SOCKS_AND_SHOES: NO DIFFICULTY AT ALL
GETTING_INTO_AND_OUT_OF_AN_AVERAGE_CAR: NO DIFFICULTY AT ALL
STANDING_FOR_15_MINUTES: NO DIFFICULTY AT ALL
DEEP_SQUATTING: SLIGHT DIFFICULTY
WALKING_UP_STEEP_HILLS: NO DIFFICULTY AT ALL
WALKING_APPROXIMATELY_10_MINUTES: SLIGHT DIFFICULTY
HOW_WOULD_YOU_RATE_YOUR_CURRENT_LEVEL_OF_FUNCTION_DURING_YOUR_USUAL_ACTIVITIES_OF_DAILY_LIVING_FROM_0_TO_100_WITH_100_BEING_YOUR_LEVEL_OF_FUNCTION_PRIOR_TO_YOUR_HIP_PROBLEM_AND_0_BEING_THE_INABILITY_TO_PERFORM_ANY_OF_YOUR_USUAL_DAILY_ACTIVITIES?: 98
GOING_DOWN_1_FLIGHT_OF_STAIRS: NO DIFFICULTY AT ALL
WALKING_DOWN_STEEP_HILLS: NO DIFFICULTY AT ALL

## 2022-05-09 ENCOUNTER — THERAPY VISIT (OUTPATIENT)
Dept: PHYSICAL THERAPY | Facility: CLINIC | Age: 62
End: 2022-05-09
Payer: COMMERCIAL

## 2022-05-09 DIAGNOSIS — M25.551 HIP PAIN, RIGHT: Primary | ICD-10-CM

## 2022-05-09 DIAGNOSIS — M25.552 HIP PAIN, LEFT: ICD-10-CM

## 2022-05-09 PROCEDURE — 97110 THERAPEUTIC EXERCISES: CPT | Mod: GP | Performed by: PHYSICAL THERAPIST

## 2022-05-23 ENCOUNTER — THERAPY VISIT (OUTPATIENT)
Dept: PHYSICAL THERAPY | Facility: CLINIC | Age: 62
End: 2022-05-23
Payer: COMMERCIAL

## 2022-05-23 DIAGNOSIS — M25.551 HIP PAIN, RIGHT: Primary | ICD-10-CM

## 2022-05-23 DIAGNOSIS — M25.552 HIP PAIN, LEFT: ICD-10-CM

## 2022-05-23 PROCEDURE — 97110 THERAPEUTIC EXERCISES: CPT | Mod: GP | Performed by: PHYSICAL THERAPIST

## 2022-06-17 ENCOUNTER — THERAPY VISIT (OUTPATIENT)
Dept: PHYSICAL THERAPY | Facility: CLINIC | Age: 62
End: 2022-06-17
Payer: COMMERCIAL

## 2022-06-17 DIAGNOSIS — M25.551 HIP PAIN, RIGHT: Primary | ICD-10-CM

## 2022-06-17 DIAGNOSIS — M25.552 HIP PAIN, LEFT: ICD-10-CM

## 2022-06-17 PROCEDURE — 97110 THERAPEUTIC EXERCISES: CPT | Mod: GP | Performed by: PHYSICAL THERAPIST

## 2022-06-17 ASSESSMENT — ACTIVITIES OF DAILY LIVING (ADL)
HOS_ADL_COUNT: 17
WALKING_UP_STEEP_HILLS: NO DIFFICULTY AT ALL
HOS_ADL_SCORE(%): 98.53
HEAVY_WORK: NO DIFFICULTY AT ALL
PUTTING_ON_SOCKS_AND_SHOES: NO DIFFICULTY AT ALL
HOS_ADL_ITEM_SCORE_TOTAL: 67
TWISTING/PIVOTING_ON_INVOLVED_LEG: NO DIFFICULTY AT ALL
WALKING_APPROXIMATELY_10_MINUTES: NO DIFFICULTY AT ALL
GOING_UP_1_FLIGHT_OF_STAIRS: NO DIFFICULTY AT ALL
STEPPING_UP_AND_DOWN_CURBS: NO DIFFICULTY AT ALL
HOS_ADL_HIGHEST_POTENTIAL_SCORE: 68
GETTING_INTO_AND_OUT_OF_AN_AVERAGE_CAR: NO DIFFICULTY AT ALL
DEEP_SQUATTING: NO DIFFICULTY AT ALL
RECREATIONAL_ACTIVITIES: NO DIFFICULTY AT ALL
ROLLING_OVER_IN_BED: NO DIFFICULTY AT ALL
WALKING_INITIALLY: SLIGHT DIFFICULTY
WALKING_DOWN_STEEP_HILLS: NO DIFFICULTY AT ALL
HOW_WOULD_YOU_RATE_YOUR_CURRENT_LEVEL_OF_FUNCTION_DURING_YOUR_USUAL_ACTIVITIES_OF_DAILY_LIVING_FROM_0_TO_100_WITH_100_BEING_YOUR_LEVEL_OF_FUNCTION_PRIOR_TO_YOUR_HIP_PROBLEM_AND_0_BEING_THE_INABILITY_TO_PERFORM_ANY_OF_YOUR_USUAL_DAILY_ACTIVITIES?: 99
GOING_DOWN_1_FLIGHT_OF_STAIRS: NO DIFFICULTY AT ALL
LIGHT_TO_MODERATE_WORK: NO DIFFICULTY AT ALL
GETTING_INTO_AND_OUT_OF_A_BATHTUB: NO DIFFICULTY AT ALL
SITTING_FOR_15_MINUTES: NO DIFFICULTY AT ALL
STANDING_FOR_15_MINUTES: NO DIFFICULTY AT ALL
WALKING_15_MINUTES_OR_GREATER: NO DIFFICULTY AT ALL

## 2022-06-25 ENCOUNTER — HOSPITAL ENCOUNTER (OUTPATIENT)
Facility: CLINIC | Age: 62
Setting detail: OBSERVATION
Discharge: HOME | End: 2022-06-26
Attending: EMERGENCY MEDICINE | Admitting: HOSPITALIST
Payer: COMMERCIAL

## 2022-06-25 ENCOUNTER — OFFICE VISIT (OUTPATIENT)
Dept: URGENT CARE | Facility: URGENT CARE | Age: 62
End: 2022-06-25
Payer: COMMERCIAL

## 2022-06-25 VITALS
OXYGEN SATURATION: 98 % | DIASTOLIC BLOOD PRESSURE: 70 MMHG | WEIGHT: 190 LBS | SYSTOLIC BLOOD PRESSURE: 136 MMHG | BODY MASS INDEX: 24.73 KG/M2 | HEART RATE: 120 BPM | TEMPERATURE: 97.3 F

## 2022-06-25 DIAGNOSIS — I48.91 ATRIAL FIBRILLATION WITH RAPID VENTRICULAR RESPONSE (H): ICD-10-CM

## 2022-06-25 DIAGNOSIS — R00.2 PALPITATIONS: ICD-10-CM

## 2022-06-25 DIAGNOSIS — I48.91 NEW ONSET ATRIAL FIBRILLATION (H): Primary | ICD-10-CM

## 2022-06-25 LAB
ANION GAP SERPL CALCULATED.3IONS-SCNC: 1 MMOL/L (ref 3–14)
ATRIAL RATE - MUSE: 170 BPM
ATRIAL RATE - MUSE: 62 BPM
BASOPHILS # BLD AUTO: 0 10E3/UL (ref 0–0.2)
BASOPHILS NFR BLD AUTO: 1 %
BUN SERPL-MCNC: 21 MG/DL (ref 7–30)
CALCIUM SERPL-MCNC: 8.9 MG/DL (ref 8.5–10.1)
CHLORIDE BLD-SCNC: 109 MMOL/L (ref 94–109)
CO2 SERPL-SCNC: 29 MMOL/L (ref 20–32)
CREAT SERPL-MCNC: 0.92 MG/DL (ref 0.66–1.25)
DIASTOLIC BLOOD PRESSURE - MUSE: NORMAL MMHG
DIASTOLIC BLOOD PRESSURE - MUSE: NORMAL MMHG
EOSINOPHIL # BLD AUTO: 0.1 10E3/UL (ref 0–0.7)
EOSINOPHIL NFR BLD AUTO: 2 %
ERYTHROCYTE [DISTWIDTH] IN BLOOD BY AUTOMATED COUNT: 12.4 % (ref 10–15)
GFR SERPL CREATININE-BSD FRML MDRD: >90 ML/MIN/1.73M2
GLUCOSE BLD-MCNC: 112 MG/DL (ref 70–99)
HCT VFR BLD AUTO: 49.6 % (ref 40–53)
HGB BLD-MCNC: 16.5 G/DL (ref 13.3–17.7)
HOLD SPECIMEN: NORMAL
IMM GRANULOCYTES # BLD: 0 10E3/UL
IMM GRANULOCYTES NFR BLD: 0 %
INTERPRETATION ECG - MUSE: NORMAL
INTERPRETATION ECG - MUSE: NORMAL
LYMPHOCYTES # BLD AUTO: 1.1 10E3/UL (ref 0.8–5.3)
LYMPHOCYTES NFR BLD AUTO: 23 %
MAGNESIUM SERPL-MCNC: 2.3 MG/DL (ref 1.6–2.3)
MCH RBC QN AUTO: 32.5 PG (ref 26.5–33)
MCHC RBC AUTO-ENTMCNC: 33.3 G/DL (ref 31.5–36.5)
MCV RBC AUTO: 98 FL (ref 78–100)
MONOCYTES # BLD AUTO: 0.5 10E3/UL (ref 0–1.3)
MONOCYTES NFR BLD AUTO: 10 %
NEUTROPHILS # BLD AUTO: 3 10E3/UL (ref 1.6–8.3)
NEUTROPHILS NFR BLD AUTO: 64 %
NRBC # BLD AUTO: 0 10E3/UL
NRBC BLD AUTO-RTO: 0 /100
P AXIS - MUSE: 49 DEGREES
P AXIS - MUSE: NORMAL DEGREES
PLATELET # BLD AUTO: 143 10E3/UL (ref 150–450)
POTASSIUM BLD-SCNC: 3.7 MMOL/L (ref 3.4–5.3)
PR INTERVAL - MUSE: 194 MS
PR INTERVAL - MUSE: NORMAL MS
QRS DURATION - MUSE: 94 MS
QRS DURATION - MUSE: 96 MS
QT - MUSE: 266 MS
QT - MUSE: 376 MS
QTC - MUSE: 381 MS
QTC - MUSE: 394 MS
R AXIS - MUSE: 12 DEGREES
R AXIS - MUSE: 69 DEGREES
RBC # BLD AUTO: 5.07 10E6/UL (ref 4.4–5.9)
SARS-COV-2 RNA RESP QL NAA+PROBE: NEGATIVE
SODIUM SERPL-SCNC: 139 MMOL/L (ref 133–144)
SYSTOLIC BLOOD PRESSURE - MUSE: NORMAL MMHG
SYSTOLIC BLOOD PRESSURE - MUSE: NORMAL MMHG
T AXIS - MUSE: -16 DEGREES
T AXIS - MUSE: 26 DEGREES
TROPONIN I SERPL HS-MCNC: 12 NG/L
TSH SERPL DL<=0.005 MIU/L-ACNC: 1.59 MU/L (ref 0.4–4)
VENTRICULAR RATE- MUSE: 132 BPM
VENTRICULAR RATE- MUSE: 62 BPM
WBC # BLD AUTO: 4.6 10E3/UL (ref 4–11)

## 2022-06-25 PROCEDURE — 250N000011 HC RX IP 250 OP 636

## 2022-06-25 PROCEDURE — 258N000003 HC RX IP 258 OP 636: Performed by: EMERGENCY MEDICINE

## 2022-06-25 PROCEDURE — C9803 HOPD COVID-19 SPEC COLLECT: HCPCS

## 2022-06-25 PROCEDURE — 84443 ASSAY THYROID STIM HORMONE: CPT | Performed by: INTERNAL MEDICINE

## 2022-06-25 PROCEDURE — 99285 EMERGENCY DEPT VISIT HI MDM: CPT | Mod: 25

## 2022-06-25 PROCEDURE — 210N000002 HC R&B HEART CARE

## 2022-06-25 PROCEDURE — 93000 ELECTROCARDIOGRAM COMPLETE: CPT | Performed by: INTERNAL MEDICINE

## 2022-06-25 PROCEDURE — U0005 INFEC AGEN DETEC AMPLI PROBE: HCPCS | Performed by: EMERGENCY MEDICINE

## 2022-06-25 PROCEDURE — 96376 TX/PRO/DX INJ SAME DRUG ADON: CPT

## 2022-06-25 PROCEDURE — 92960 CARDIOVERSION ELECTRIC EXT: CPT

## 2022-06-25 PROCEDURE — 250N000011 HC RX IP 250 OP 636: Performed by: INTERNAL MEDICINE

## 2022-06-25 PROCEDURE — 99207 PR INPT ADMISSION FROM CLINIC: CPT | Performed by: INTERNAL MEDICINE

## 2022-06-25 PROCEDURE — 85014 HEMATOCRIT: CPT | Performed by: EMERGENCY MEDICINE

## 2022-06-25 PROCEDURE — 99152 MOD SED SAME PHYS/QHP 5/>YRS: CPT

## 2022-06-25 PROCEDURE — 93005 ELECTROCARDIOGRAM TRACING: CPT | Mod: 59

## 2022-06-25 PROCEDURE — 250N000013 HC RX MED GY IP 250 OP 250 PS 637: Performed by: INTERNAL MEDICINE

## 2022-06-25 PROCEDURE — 83735 ASSAY OF MAGNESIUM: CPT | Performed by: INTERNAL MEDICINE

## 2022-06-25 PROCEDURE — 36415 COLL VENOUS BLD VENIPUNCTURE: CPT | Performed by: EMERGENCY MEDICINE

## 2022-06-25 PROCEDURE — 99223 1ST HOSP IP/OBS HIGH 75: CPT | Mod: AI | Performed by: INTERNAL MEDICINE

## 2022-06-25 PROCEDURE — 84484 ASSAY OF TROPONIN QUANT: CPT | Performed by: EMERGENCY MEDICINE

## 2022-06-25 PROCEDURE — 250N000009 HC RX 250: Performed by: EMERGENCY MEDICINE

## 2022-06-25 PROCEDURE — 96366 THER/PROPH/DIAG IV INF ADDON: CPT

## 2022-06-25 PROCEDURE — 96368 THER/DIAG CONCURRENT INF: CPT

## 2022-06-25 PROCEDURE — 96365 THER/PROPH/DIAG IV INF INIT: CPT | Mod: 59

## 2022-06-25 PROCEDURE — 82310 ASSAY OF CALCIUM: CPT | Performed by: EMERGENCY MEDICINE

## 2022-06-25 PROCEDURE — 250N000011 HC RX IP 250 OP 636: Performed by: EMERGENCY MEDICINE

## 2022-06-25 RX ORDER — DILTIAZEM HYDROCHLORIDE 5 MG/ML
25 INJECTION INTRAVENOUS ONCE
Status: COMPLETED | OUTPATIENT
Start: 2022-06-25 | End: 2022-06-25

## 2022-06-25 RX ORDER — HEPARIN SODIUM 10000 [USP'U]/100ML
0-5000 INJECTION, SOLUTION INTRAVENOUS CONTINUOUS
Status: DISCONTINUED | OUTPATIENT
Start: 2022-06-25 | End: 2022-06-26

## 2022-06-25 RX ORDER — OXYCODONE HYDROCHLORIDE 5 MG/1
5 TABLET ORAL EVERY 4 HOURS PRN
Status: DISCONTINUED | OUTPATIENT
Start: 2022-06-25 | End: 2022-06-26 | Stop reason: HOSPADM

## 2022-06-25 RX ORDER — PROPOFOL 10 MG/ML
INJECTION, EMULSION INTRAVENOUS DAILY PRN
Status: COMPLETED | OUTPATIENT
Start: 2022-06-25 | End: 2022-06-25

## 2022-06-25 RX ORDER — METOPROLOL TARTRATE 25 MG/1
25 TABLET, FILM COATED ORAL 2 TIMES DAILY
Status: DISCONTINUED | OUTPATIENT
Start: 2022-06-25 | End: 2022-06-26 | Stop reason: HOSPADM

## 2022-06-25 RX ORDER — PROPOFOL 10 MG/ML
INJECTION, EMULSION INTRAVENOUS
Status: COMPLETED
Start: 2022-06-25 | End: 2022-06-25

## 2022-06-25 RX ORDER — PROPOFOL 10 MG/ML
155 INJECTION, EMULSION INTRAVENOUS ONCE
Status: COMPLETED | OUTPATIENT
Start: 2022-06-25 | End: 2022-06-25

## 2022-06-25 RX ADMIN — PROPOFOL 155 MG: 10 INJECTION, EMULSION INTRAVENOUS at 15:28

## 2022-06-25 RX ADMIN — PROPOFOL 25 MG: 10 INJECTION, EMULSION INTRAVENOUS at 15:05

## 2022-06-25 RX ADMIN — PROPOFOL 50 MG: 10 INJECTION, EMULSION INTRAVENOUS at 15:04

## 2022-06-25 RX ADMIN — PROPOFOL 20 MG: 10 INJECTION, EMULSION INTRAVENOUS at 15:05

## 2022-06-25 RX ADMIN — PROPOFOL 20 MG: 10 INJECTION, EMULSION INTRAVENOUS at 15:07

## 2022-06-25 RX ADMIN — PROPOFOL 20 MG: 10 INJECTION, EMULSION INTRAVENOUS at 15:12

## 2022-06-25 RX ADMIN — PROPOFOL 20 MG: 10 INJECTION, EMULSION INTRAVENOUS at 15:11

## 2022-06-25 RX ADMIN — METOPROLOL TARTRATE 25 MG: 25 TABLET, FILM COATED ORAL at 19:03

## 2022-06-25 RX ADMIN — DILTIAZEM HYDROCHLORIDE 5 MG/HR: 5 INJECTION, SOLUTION INTRAVENOUS at 16:17

## 2022-06-25 RX ADMIN — DILTIAZEM HYDROCHLORIDE 25 MG: 5 INJECTION INTRAVENOUS at 15:45

## 2022-06-25 RX ADMIN — HEPARIN SODIUM 1050 UNITS/HR: 10000 INJECTION, SOLUTION INTRAVENOUS at 18:59

## 2022-06-25 ASSESSMENT — ENCOUNTER SYMPTOMS
ABDOMINAL PAIN: 0
PALPITATIONS: 1
SHORTNESS OF BREATH: 1

## 2022-06-25 ASSESSMENT — ACTIVITIES OF DAILY LIVING (ADL)
ADLS_ACUITY_SCORE: 35

## 2022-06-25 NOTE — ED TRIAGE NOTES
Feels like chest is pounding and heaviness since 11am, feels like legs are heavy, went to , ekg shows afib     Triage Assessment     Row Name 06/25/22 1342       Triage Assessment (Adult)    Airway WDL WDL       Respiratory WDL    Respiratory WDL WDL       Skin Circulation/Temperature WDL    Skin Circulation/Temperature WDL WDL       Cardiac WDL    Cardiac WDL rhythm;chest pain       Cognitive/Neuro/Behavioral WDL    Cognitive/Neuro/Behavioral WDL WDL               No

## 2022-06-25 NOTE — ED PROVIDER NOTES
History   Chief Complaint:  Palpitations       HPI   Gabe Moon is a 62 year old male who presents with palpitations and shortness of breath while hiking today. He has had palpitations before but they have normally resolved in a few minutes. He has been stressed form work recently and drank a bottle of fine yesterday. He denies leg swelling, prolonged travel, and abdominal pain.     Review of Systems   Respiratory: Positive for shortness of breath.    Cardiovascular: Positive for palpitations. Negative for leg swelling.   Gastrointestinal: Negative for abdominal pain.   All other systems reviewed and are negative.    Allergies:  No known drug allergies     Medications:  The patient is not currently taking any prescribed medications.     Past Medical History:     Family history of prostate cancer     Past Surgical History:    Testicular varicose vein repair  Inguinal hernia repair  Lumbar spine issues     Family History:    Mother: breast cancer, alzheimer disease, depression, bipolar dementia, arrhythmia, CAD  Father: diabetes type 2, hypertension, prostrate cancer  Brother: prostate cancer, arrhythmia    Social History:  The patient presents to the ED with wife    Physical Exam     Patient Vitals for the past 24 hrs:   BP Temp Pulse Resp SpO2 Height Weight   06/25/22 1526 -- -- 116 12 99 % -- --   06/25/22 1525 -- -- 98 15 99 % -- --   06/25/22 1523 -- -- 112 8 99 % -- --   06/25/22 1522 -- -- 107 12 100 % -- --   06/25/22 1521 -- -- (!) 122 (!) 7 100 % -- --   06/25/22 1520 (!) 113/99 -- 115 9 100 % -- --   06/25/22 1519 -- -- (!) 130 11 100 % -- --   06/25/22 1518 -- -- 111 17 100 % -- --   06/25/22 1517 -- -- 105 27 99 % -- --   06/25/22 1516 -- -- 106 22 99 % -- --   06/25/22 1515 (!) 128/92 -- 102 (!) 0 99 % -- --   06/25/22 1514 -- -- 95 10 99 % -- --   06/25/22 1513 -- -- 113 10 99 % -- --   06/25/22 1512 -- -- 106 10 99 % -- --   06/25/22 1511 -- -- 104 12 99 % -- --   06/25/22 1510 (!) 114/95 --  "86 -- -- -- --   06/25/22 1509 -- -- 113 18 100 % -- --   06/25/22 1508 -- -- 120 10 98 % -- --   06/25/22 1507 -- -- 98 16 99 % -- --   06/25/22 1506 -- -- (!) 133 13 99 % -- --   06/25/22 1505 (!) 118/109 -- (!) 131 8 100 % -- --   06/25/22 1504 -- -- (!) 124 13 100 % -- --   06/25/22 1503 -- -- (!) 131 17 100 % -- --   06/25/22 1502 -- -- (!) 130 11 99 % -- --   06/25/22 1501 -- -- (!) 126 8 99 % -- --   06/25/22 1500 (!) 87/66 -- (!) 122 (!) 34 99 % -- --   06/25/22 1459 -- -- (!) 123 13 98 % -- --   06/25/22 1458 (!) 87/66 -- 111 12 97 % -- --   06/25/22 1409 -- -- (!) 133 14 98 % -- --   06/25/22 1400 (!) 138/110 -- (!) 121 11 -- -- --   06/25/22 1341 (!) 154/105 97.9  F (36.6  C) 101 20 98 % 1.88 m (6' 2\") 86.2 kg (190 lb)       Physical Exam  Eye:  Pupils are equal, round, and reactive.  Extraocular movements intact.    ENT:  No rhinorrhea.  Moist mucus membranes.  Normal tongue and tonsil.    Cardiac:  irregularly irregular and markedly tachycardic.  No murmurs, gallops, or rubs.    Pulmonary:  Clear to auscultation bilaterally.  No wheezes, rales, or rhonchi.    Abdomen:  Positive bowel sounds.  Abdomen is soft and non-distended, without focal tenderness.    Musculoskeletal:  Normal movement of all extremities without evidence for deficit.    Skin:  Warm and dry without rashes.    Neurologic:  Non-focal exam without asymmetric weakness or numbness.     Psychiatric:  Normal affect with appropriate interaction with examiner.    Emergency Department Course   ECG  ECG taken at 1343, ECG read at 1417  Atrial fibrillation with rapid ventriclar response  incomplete right bundle branch block  Nonspecific ST abnormality  Abnormal QRS-T angle, consider primary T wave abnormality  Abnormal ECG   Rate 132 bpm. IN interval * ms. QRS duration 94 ms. QT/QTc 266/394 ms. P-R-T axes * 69 -16.     ECG  ECG taken at 1516, ECG read at 1516  Atrial fibrillation with rapid ventricular response  Abnormal ECG   Rate 119 bpm. IN " interval * ms. QRS duration 88 ms. QT/QTc 324/455 ms. P-R-T axes * 17 31.     Laboratory:  Labs Ordered and Resulted from Time of ED Arrival to Time of ED Departure   BASIC METABOLIC PANEL - Abnormal       Result Value    Sodium 139      Potassium 3.7      Chloride 109      Carbon Dioxide (CO2) 29      Anion Gap 1 (*)     Urea Nitrogen 21      Creatinine 0.92      Calcium 8.9      Glucose 112 (*)     GFR Estimate >90     CBC WITH PLATELETS AND DIFFERENTIAL - Abnormal    WBC Count 4.6      RBC Count 5.07      Hemoglobin 16.5      Hematocrit 49.6      MCV 98      MCH 32.5      MCHC 33.3      RDW 12.4      Platelet Count 143 (*)     % Neutrophils 64      % Lymphocytes 23      % Monocytes 10      % Eosinophils 2      % Basophils 1      % Immature Granulocytes 0      NRBCs per 100 WBC 0      Absolute Neutrophils 3.0      Absolute Lymphocytes 1.1      Absolute Monocytes 0.5      Absolute Eosinophils 0.1      Absolute Basophils 0.0      Absolute Immature Granulocytes 0.0      Absolute NRBCs 0.0     TROPONIN I - Normal    Troponin I High Sensitivity 12     COVID-19 VIRUS (CORONAVIRUS) BY PCR        [employer]    -Cardioversion External    Date/Time: 6/25/2022 2:59 PM  Performed by: Trierweiler, Chad A, MD  Authorized by: Trierweiler, Chad A, MD     Risks, benefits and alternatives discussed.    ED EVALUATION:      I have performed an Emergency Department Evaluation including taking a history and physical examination, this evaluation will be documented in the electronic medical record for this ED encounter.      ASA Class: Class 1- healthy patient    Mallampati: Grade 1- soft palate, uvula, tonsillar pillars, and posterior pharyngeal wall visible    NPO Status: not NPO, emergent situation    UNIVERSAL PROTOCOL   Site Marked: NA  Prior Images Obtained and Reviewed:  NA  Required items: Required blood products, implants, devices and special equipment available    Patient identity confirmed:  Verbally  with patient  Patient was reevaluated immediately before administering moderate or deep sedation or anesthesia  Confirmation Checklist:  Patient's identity using two indicators  Time out: Immediately prior to the procedure a time out was called    Universal Protocol: the Joint Commission Universal Protocol was followed      SEDATION  Patient Sedated: Yes    Sedation Type:  Moderate (conscious) sedation  Sedation:  Propofol  Vital signs: Vital signs monitored during sedation      PRE-PROCEDURE DETAILS:     Cardioversion basis:  Emergent    Rhythm:  Atrial fibrillation    Electrode placement:  Anterior-posterior  Attempt one:     Cardioversion mode:  Synchronous    Waveform:  Biphasic    Shock (Joules):  200    Shock outcome:  No change in rhythm  Attempt two:     Cardioversion mode:  Synchronous    Waveform:  Biphasic    Shock (Joules):  200    Shock outcome:  No change in rhythm  Post-procedure details:     Patient status:  Awake      PROCEDURE    Patient Tolerance:  Patient tolerated the procedure well with no immediate complications  Length of time physician/provider present for 1:1 monitoring during sedation: 15         Emergency Department Course:  Reviewed:  I reviewed nursing notes, vitals and past medical history    Assessments:  1356 I obtained history and examined the patient as noted above.   1455 I rechecked the patient and performed a cardioversion.     Consults:  1527 I spoke with Malou Bustamanteof the Hospitalist service from [facility] regarding patient's presentation, findings, and plan of care.     Interventions:  1504 Propofol, 50 mg, IV  1505 Propofol, 20 mg, IV  1505 Propofol, 25 mg, IV  1507 Propofol, 20 mg, IV  1511 Propofol, 20 mg, IV  1512 Propofol, 20 mg, IV    Disposition:  The patient was admitted to the hospital under the care of Malou Bustamante.     Impression & Plan   Medical Decision Making:  This very healthy 62-year-old man presents to us with sudden  onset of palpitations while hiking earlier today.  The heart rate did not come down and felt irregular and he was confirmed to be in atrial fibrillation.  He is confident this is a new onset of symptoms and I feel that he is an ideal candidate for cardioversion.  Laboratory investigation is reassuring.  He was moved to a front room where he underwent electrocardioversion with 200 J twice.  This failed to convert him into normal sinus rhythm.    With his inability to be cardioverted, he was placed on diltiazem with a drip.  He will be admitted to Dr. Stover of the hospitalist service until cardiology can consult and determine a plan.  Otherwise, the patient's questions were answered he is comfortable with the plan for admission.    Diagnosis:    ICD-10-CM    1. Atrial fibrillation with rapid ventricular response (H)  I48.91        Scribe Disclosure:  I, Chau Shah, am serving as a scribe at 1:58 PM on 6/25/2022 to document services personally performed by Trierweiler, Chad A, MD based on my observations and the provider's statements to me.          Trierweiler, Chad A, MD  06/25/22 2005

## 2022-06-25 NOTE — PROGRESS NOTES
BRIEF URGENT CARE TRIAGE ASSESSMENT:  This 62 year old male presents with acute onset palpitations and lightheadedness.  Was out walking when he noted clear onset of rapid and irregular heart rate.  Associated feeling of lightheadedness and some diaphoresis.  No chest pain.  No shortness of breath.  Denies prior cardiac history.  On no medications.      OBJECTIVE:  /70 (BP Location: Right arm)   Pulse 120   Temp 97.3  F (36.3  C) (Tympanic)   Wt 86.2 kg (190 lb)   SpO2 98%   BMI 24.73 kg/m    GEN: alert and oriented, comfortable    EKG shows atrial fibrillation at a rate of 79.      ASSESSMENT/PLAN:    ICD-10-CM    1. New onset atrial fibrillation (H)  I48.91    2. Palpitations  R00.2 EKG 12-lead complete w/read - Clinics     Clear onset of new atrial fibrillation in an otherwise healthy 63 yo male without known heart disease or risk factors.  Hemodynamically stable but symptomatic.  Good candidate for immediate cardioversion.  Referred to [facility] ER.    Total time spent in counseling and care coordination with the receiving facility was 10 minutes.    Eric Schroeder MD

## 2022-06-25 NOTE — PATIENT INSTRUCTIONS
Patient without known cardiac history presents with clear onset of palpitations and lightheadedness at 11:00 AM this morning.  EKG shows atrial fibrillation here in the clinic.    /70 (BP Location: Right arm)   Pulse 120   Temp 97.3  F (36.3  C) (Tympanic)   Wt 86.2 kg (190 lb)   SpO2 98%   BMI 24.73 kg/m      Symptomatic, hemodynamically stable new onset atrial fibrillation with clear symptom onset within < 24 hours.  Good candidate for immediate cardioversion.  Referred to [facility] ER by private vehicle.

## 2022-06-25 NOTE — PHARMACY-ADMISSION MEDICATION HISTORY
Pharmacy Medication History  Admission medication history interview status for the 6/25/2022  admission is complete. See EPIC admission navigator for prior to admission medications     Location of Interview: Patient room  Medication history sources: Patient's family/friend (SO)    Significant changes made to the medication list:      In the past week, patient estimated taking medication this percent of the time: greater than 90%    Additional medication history information:   No rx meds    Medication reconciliation completed by provider prior to medication history? No    Time spent in this activity: 5min    Prior to Admission medications    Medication Sig Last Dose Taking? Auth Provider Long Term End Date   multivitamin, therapeutic with minerals (MULTI-VITAMIN) TABS tablet Take 1 tablet by mouth daily 6/25/2022 at Unknown time Yes Beatriz Edmondson MD         The information provided in this note is only as accurate as the sources available at the time of update(s)

## 2022-06-25 NOTE — ED NOTES
[facility]  ED Nurse Handoff Report    ED Chief complaint: Palpitations      ED Diagnosis:   Final diagnoses:   Atrial fibrillation with rapid ventricular response (H)       Code Status:  Not on file    Allergies: No Known Allergies    Patient Story: Pt presents to ED with palpitations and fatigue that started while he was hiking today.  Focused Assessment:  Pt found to be in a-fib in triage with a rate in the 130s.  Pt reports fatigue but was able to walk back from triage without difficulty.  Basic lab work unremarkable.  Pt alert and oriented x 4 and cooperative with cares.   Pt sedated with propofol, electric cardioversion failed x2.  Plan to start dilt drip and admit pt.      Treatments and/or interventions provided: See above  Patient's response to treatments and/or interventions: See above    To be done/followed up on inpatient unit:  NA    Does this patient have any cognitive concerns?: None    Activity level - Baseline/Home:  Independent  Activity Level - Current:   Independent    Patient's Preferred language: English   Needed?: No    Isolation: None  Infection: Not Applicable  Patient tested for COVID 19 prior to admission: YES  Bariatric?: No    Vital Signs:   Vitals:    06/25/22 1522 06/25/22 1523 06/25/22 1525 06/25/22 1526   BP:       Pulse: 107 112 98 116   Resp: 12 8 15 12   Temp:       SpO2: 100% 99% 99% 99%   Weight:       Height:           Cardiac Rhythm:Cardiac Rhythm: Atrial fibrillation    Was the PSS-3 completed:   Yes  What interventions are required if any?               Family Comments: Wife at bedside   OBS brochure/video discussed/provided to patient/family: N/A              Name of person given brochure if not patient: NA              Relationship to patient: NA    For the majority of the shift this patient's behavior was Green.   Behavioral interventions performed were NA.    ED NURSE PHONE NUMBER: *[phone]

## 2022-06-25 NOTE — H&P
[facility]    History and Physical - Hospitalist Service       Date of Admission:  6/25/2022    Assessment & Plan      Gabe Moon is a 62 year old otherwise healthy male with no PMHx who was admitted on 6/25/2022 for new onset rapid afib. Cardioversion x2 attempted in ED but did not convert.     New onset afib with RVR  * No known hx of cardiovascular disease. Does some form of physical activity daily.   * New onset palpitations, fast HR and mild lightheadedness on day of admission while hiking. Sx persisted despite rest.  * In ED, was found to be in rapid afib with -140s. ED MD attempted cardioversion x2 but did not convert and remained in rapid afib. Started on diltiazem gtt. Labs unremarkable, trop neg, TSH nl.   -- check echo  -- cont diltiazem gtt for now, attempt to transition to po meds as able -- metoprolol 25mg BID ordered  -- CHADS2 VASc score is 0 and appears to have had clear onset of rapid afib this morning, but given attempted cardioversion in ED and the fact that he remains in rapid afib, will start heparin gtt for now   -- cardiology consult, assistance with care much appreciated    Thrombocytopenia, mild  * Platelet count 143 today. No prior CBC available for review. Monitor periodically.      Diet: Regular diet  DVT Prophylaxis: heparin gtt  Villela Catheter: Not present  Central Lines: None  Code Status: Full code    Clinically Significant Risk Factors Present on Admission                            Malou Stover,   Hospitalist Service  [facility]  Securely message with the Vocera Web Console (learn more here)  Text page via [employer] Paging/Directory       ______________________________________________________________________    Chief Complaint   Palpitations.    History is obtained from the patient and significant other at bedside.     History of Present Illness   Gabe Moon is a very pleasant 62 year old male with no  significant PMHx who presented to ED for evaluation of new onset palpitations and fast heart rate.     Has been in his usual state of health. No recent illness or changes in his health status. Is active -- says he works out or dose some form of physical activity daily. Was out for a hike this earlier today when he developed new onset sensation of palpitations at around 1115. Described as fluttering sensation in his chest. No chest pain/heaviness. Some mild lightheadedness and shortness of breath. Symptoms persisted despite rest. Called significant other. Ultimately came to ED for evaluation. No recent fever/chills, cough, abd pain/n/v, changes in bowel/bladder habits.     In ED, was found to be in rapid afib. -140s. BPs okay. Labs okay, trop neg. ED MD attempted cardioversion x2 but he remained in rapid afib. Was started on diltiazem gtt. Will admit to hospital. When I saw him he was resting comfortably, alert and conversing appropriately. HRs 120s during my visit prior to IV dilt being pushed and gtt started. Noted his twin brother was diagnosed with afib in the past year.    Review of Systems    The 10 point Review of Systems is negative other than noted in the HPI or here.    Past Medical History    I have reviewed this patient's medical history and updated it with pertinent information if needed.   Past Medical History:   Diagnosis Date     Blood in semen      Inguinal hernia of right side with gangrene 2003     Low back pain      Prostate infection      STD (sexually transmitted disease)      Varicose vein of leg        Past Surgical History   I have reviewed this patient's surgical history and updated it with pertinent information if needed.  Past Surgical History:   Procedure Laterality Date     ABDOMEN SURGERY  may 1991    testicular varicose vein repair     COLONOSCOPY  May 2013    MRI Virtual Colonoscopy     COLONOSCOPY N/A 4/1/2021    Procedure: COLONOSCOPY;  Surgeon: Gage Mckee MD;  Location:  [facility]     HERNIA REPAIR      inguinal     LIGATE VEIN      '93 scrotum, '14 left leg     SOFT TISSUE SURGERY  2002    Lumbar spine issues, facet joint sundrome       Social History   I have reviewed this patient's social history and updated it with pertinent information if needed.  Social History     Tobacco Use     Smoking status: Never Smoker     Smokeless tobacco: Never Used   Substance Use Topics     Alcohol use: Yes     Alcohol/week: 0.0 standard drinks     Drug use: No     Comment: once daily       Family History   I have reviewed this patient's family history and updated it with pertinent information if needed.  Family History   Problem Relation Age of Onset     Breast Cancer Mother      Alzheimer Disease Mother      Depression Mother      Mental Illness Mother         Bi-Polar, Dementia, passed away age 84     Arrhythmia Mother         fast HR, continued on meds, no blood thinners     Coronary Artery Disease Mother      Diabetes Father         Type II     Hypertension Father         managed with Medication     Prostate Cancer Father         diagnosis at age 77, now 88     Osteoarthritis Father         hip replacement (overwt)     Coronary Artery Disease Father         Congestive Heart Failure, diagnonsed      Prostate Cancer Brother 55     Arrhythmia Brother 60        ER visit, meds x 10 days; reported as afib     Prostate Cancer Brother         diagnosis 2021, treatment pending     Osteoarthritis Paternal Grandfather         hip replacements     Colon Cancer Maternal Cousin 53         of colon cancer at 58       Prior to Admission Medications   Prior to Admission Medications   Prescriptions Last Dose Informant Patient Reported? Taking?   multivitamin, therapeutic with minerals (MULTI-VITAMIN) TABS tablet   No No   Sig: Take 1 tablet by mouth daily      Facility-Administered Medications: None     Allergies   No Known Allergies    Physical Exam   Vital Signs: Temp: 97.9  F (36.6  C)   BP:  (!) 113/99 Pulse: 116   Resp: 12 SpO2: 99 % O2 Device: None (Room air) Oxygen Delivery: 8 LPM  Weight: 190 lbs 0 oz    Constitutional: Awake, alert, cooperative, no apparent distress, and appears stated age  HEENT: PERRLA, EOMI  Respiratory: No increased work of breathing, good air exchange, clear to auscultation bilaterally, no crackles or wheezing  Cardiovascular: Tachycardic and irregular, no MGR, pulses are +2 and symmetric in bilateral UE/LEs,  no LE edema  GI: No scars, normal bowel sounds, soft, non-distended, non-tender, no masses palpated, no hepatosplenomegally  Skin: no rashes, no lesions and no jaundice  Musculoskeletal: There is no redness, warmth, or swelling of the joints.  Full range of motion noted.  Motor strength is 5 out of 5 all extremities bilaterally.  Tone is normal.  Neurologic: Awake, alert, oriented to name, place and time.  Cranial nerves II-XII are grossly intact.  Motor is 5 out of 5 bilaterally.  Cerebellar finger to nose, heel to shin intact.  Sensory is intact.   Neuropsychiatric: General: normal, calm and normal eye contact.  Affect: normal.  Orientation: oriented to self, place, time and situation    Data   Data reviewed today: I reviewed all medications, new labs and imaging results over the last 24 hours. I personally reviewed the EKG tracing showing rapid afib.    Recent Labs   Lab 06/25/22  1351   WBC 4.6   HGB 16.5   MCV 98   *      POTASSIUM 3.7   CHLORIDE 109   CO2 29   BUN 21   CR 0.92   ANIONGAP 1*   TIMOTEO 8.9   *

## 2022-06-26 ENCOUNTER — APPOINTMENT (OUTPATIENT)
Dept: CARDIOLOGY | Facility: CLINIC | Age: 62
End: 2022-06-26
Attending: INTERNAL MEDICINE
Payer: COMMERCIAL

## 2022-06-26 VITALS
RESPIRATION RATE: 18 BRPM | WEIGHT: 194.5 LBS | HEART RATE: 63 BPM | BODY MASS INDEX: 24.96 KG/M2 | SYSTOLIC BLOOD PRESSURE: 125 MMHG | TEMPERATURE: 98.5 F | OXYGEN SATURATION: 98 % | HEIGHT: 74 IN | DIASTOLIC BLOOD PRESSURE: 89 MMHG

## 2022-06-26 LAB
ANION GAP SERPL CALCULATED.3IONS-SCNC: 4 MMOL/L (ref 3–14)
BUN SERPL-MCNC: 16 MG/DL (ref 7–30)
CALCIUM SERPL-MCNC: 8.7 MG/DL (ref 8.5–10.1)
CHLORIDE BLD-SCNC: 107 MMOL/L (ref 94–109)
CO2 SERPL-SCNC: 30 MMOL/L (ref 20–32)
CREAT SERPL-MCNC: 0.84 MG/DL (ref 0.66–1.25)
ERYTHROCYTE [DISTWIDTH] IN BLOOD BY AUTOMATED COUNT: 12.4 % (ref 10–15)
GFR SERPL CREATININE-BSD FRML MDRD: >90 ML/MIN/1.73M2
GLUCOSE BLD-MCNC: 102 MG/DL (ref 70–99)
HCT VFR BLD AUTO: 43.7 % (ref 40–53)
HGB BLD-MCNC: 15.1 G/DL (ref 13.3–17.7)
LVEF ECHO: NORMAL
MCH RBC QN AUTO: 32.5 PG (ref 26.5–33)
MCHC RBC AUTO-ENTMCNC: 34.6 G/DL (ref 31.5–36.5)
MCV RBC AUTO: 94 FL (ref 78–100)
PLATELET # BLD AUTO: 142 10E3/UL (ref 150–450)
POTASSIUM BLD-SCNC: 3.5 MMOL/L (ref 3.4–5.3)
RBC # BLD AUTO: 4.64 10E6/UL (ref 4.4–5.9)
SODIUM SERPL-SCNC: 141 MMOL/L (ref 133–144)
UFH PPP CHRO-ACNC: 0.38 IU/ML
UFH PPP CHRO-ACNC: 0.5 IU/ML
WBC # BLD AUTO: 5.7 10E3/UL (ref 4–11)

## 2022-06-26 PROCEDURE — 250N000013 HC RX MED GY IP 250 OP 250 PS 637: Performed by: INTERNAL MEDICINE

## 2022-06-26 PROCEDURE — 93306 TTE W/DOPPLER COMPLETE: CPT | Mod: 26 | Performed by: INTERNAL MEDICINE

## 2022-06-26 PROCEDURE — 96376 TX/PRO/DX INJ SAME DRUG ADON: CPT | Mod: 59

## 2022-06-26 PROCEDURE — 80048 BASIC METABOLIC PNL TOTAL CA: CPT | Performed by: INTERNAL MEDICINE

## 2022-06-26 PROCEDURE — 99217 PR OBSERVATION CARE DISCHARGE: CPT | Performed by: HOSPITALIST

## 2022-06-26 PROCEDURE — 85520 HEPARIN ASSAY: CPT | Performed by: INTERNAL MEDICINE

## 2022-06-26 PROCEDURE — 999N000208 ECHOCARDIOGRAM COMPLETE

## 2022-06-26 PROCEDURE — G0378 HOSPITAL OBSERVATION PER HR: HCPCS

## 2022-06-26 PROCEDURE — 85027 COMPLETE CBC AUTOMATED: CPT | Performed by: INTERNAL MEDICINE

## 2022-06-26 PROCEDURE — 36415 COLL VENOUS BLD VENIPUNCTURE: CPT | Performed by: INTERNAL MEDICINE

## 2022-06-26 PROCEDURE — 255N000002 HC RX 255 OP 636: Performed by: INTERNAL MEDICINE

## 2022-06-26 RX ORDER — ASPIRIN 325 MG
325 TABLET ORAL DAILY
Qty: 30 TABLET | Refills: 0 | Status: SHIPPED | OUTPATIENT
Start: 2022-06-26 | End: 2022-11-03 | Stop reason: DRUGHIGH

## 2022-06-26 RX ORDER — AMOXICILLIN 250 MG
1 CAPSULE ORAL 2 TIMES DAILY PRN
Status: DISCONTINUED | OUTPATIENT
Start: 2022-06-26 | End: 2022-06-26 | Stop reason: HOSPADM

## 2022-06-26 RX ORDER — LIDOCAINE 40 MG/G
CREAM TOPICAL
Status: DISCONTINUED | OUTPATIENT
Start: 2022-06-26 | End: 2022-06-26 | Stop reason: HOSPADM

## 2022-06-26 RX ORDER — AMOXICILLIN 250 MG
2 CAPSULE ORAL 2 TIMES DAILY PRN
Status: DISCONTINUED | OUTPATIENT
Start: 2022-06-26 | End: 2022-06-26 | Stop reason: HOSPADM

## 2022-06-26 RX ORDER — ASPIRIN 325 MG
325 TABLET ORAL DAILY
Status: DISCONTINUED | OUTPATIENT
Start: 2022-06-26 | End: 2022-06-26 | Stop reason: HOSPADM

## 2022-06-26 RX ORDER — ONDANSETRON 2 MG/ML
4 INJECTION INTRAMUSCULAR; INTRAVENOUS EVERY 6 HOURS PRN
Status: DISCONTINUED | OUTPATIENT
Start: 2022-06-26 | End: 2022-06-26 | Stop reason: HOSPADM

## 2022-06-26 RX ORDER — METOPROLOL SUCCINATE 50 MG/1
50 TABLET, EXTENDED RELEASE ORAL EVERY EVENING
Qty: 30 TABLET | Refills: 0 | Status: SHIPPED | OUTPATIENT
Start: 2022-06-26 | End: 2022-07-20

## 2022-06-26 RX ORDER — ONDANSETRON 4 MG/1
4 TABLET, ORALLY DISINTEGRATING ORAL EVERY 6 HOURS PRN
Status: DISCONTINUED | OUTPATIENT
Start: 2022-06-26 | End: 2022-06-26 | Stop reason: HOSPADM

## 2022-06-26 RX ADMIN — HUMAN ALBUMIN MICROSPHERES AND PERFLUTREN 9 ML: 10; .22 INJECTION, SOLUTION INTRAVENOUS at 13:27

## 2022-06-26 RX ADMIN — METOPROLOL TARTRATE 25 MG: 25 TABLET, FILM COATED ORAL at 09:03

## 2022-06-26 ASSESSMENT — ACTIVITIES OF DAILY LIVING (ADL)
DOING_ERRANDS_INDEPENDENTLY_DIFFICULTY: NO
ADLS_ACUITY_SCORE: 20
ADLS_ACUITY_SCORE: 20
TOILETING_ISSUES: NO
ADLS_ACUITY_SCORE: 20
ADLS_ACUITY_SCORE: 20
WALKING_OR_CLIMBING_STAIRS_DIFFICULTY: NO
FALL_HISTORY_WITHIN_LAST_SIX_MONTHS: NO
ADLS_ACUITY_SCORE: 35
WEAR_GLASSES_OR_BLIND: YES
CHANGE_IN_FUNCTIONAL_STATUS_SINCE_ONSET_OF_CURRENT_ILLNESS/INJURY: NO
ADLS_ACUITY_SCORE: 20
ADLS_ACUITY_SCORE: 20
DIFFICULTY_EATING/SWALLOWING: NO
CONCENTRATING,_REMEMBERING_OR_MAKING_DECISIONS_DIFFICULTY: NO
ADLS_ACUITY_SCORE: 20
DRESSING/BATHING_DIFFICULTY: NO

## 2022-06-26 NOTE — PLAN OF CARE
Goal Outcome Evaluation:    Plan of Care Reviewed With: patient      Patient admitted this shift from ED for A-fib RVR. Patient was CV and required Cardizem gtt. Patient was sinus rhythm and Heparin gtt was infusing at 1050 when he arrived at [facility]. VSS. Denies pain. Ambulated without difficulties. No other symptoms. Remained NPO since midnight.

## 2022-06-26 NOTE — DISCHARGE SUMMARY
[facility]    Discharge Summary  Hospitalist    Date of Admission:  6/25/2022  Date of Discharge:  6/26/2022  Discharging Provider: Meera Caballero DO  Date of Service (when I saw the patient): 06/26/22    Discharge Diagnoses   New onset afib with RVR, resolved  Thrombocytopenia, mild    History of Present Illness   Gabe Moon is an 62 year old male who presented with new onset rapid atrial fibrillation. Failed cardioversion x2, but was able to be converted with diltiazem gtt and initiation of metoprolol.     Hospital Course   Gabe Moon was admitted on 6/25/2022.  The following problems were addressed during his hospitalization:    New onset afib with RVR  * No known hx of cardiovascular disease. Does some form of physical activity daily. Recently under a lot of stress, working out more often, getting less sleep, drinking caffeine and had wine night prior to onset of atrial fib.  * New onset palpitations, fast HR and mild lightheadedness on day of admission while hiking. Sx persisted despite rest.   * In ED, was found to be in rapid afib with -140s. ED MD attempted cardioversion x2 but did not convert and remained in rapid afib. Started on diltiazem gtt. Labs unremarkable, trop neg, TSH nl.   * Converted to NSR 6/25 night. Dilt gtt turned off, started on metoprolol 25mg BID  * 6/26 Echo: EF 55-60%, no regional WMA, trace TR, MR. Technically difficult.  -- transition to metoprolol succinate 50mg qpm, first dose 6/26 evening  -- CHADS2 VASc score is 0, discharge on 325mg ASA once daily  -- discussed with cardiology, recommend follow up with [facility] in one month  -- follow up with PCP within one week to discuss medication change, new diagnosis  -- consideration for ziopatch in future to monitor for afib burden and need for ongoing BB/ASA therapy     Thrombocytopenia, mild  * Platelet count 143 on admit, stable at 142 on day of discharge  - Defer further  monitoring to PCP    Meera Caballero DO    Significant Results and Procedures   6/25: failed cardioversion x2    Pending Results   NA    Code Status   Full Code       Primary Care Physician   Beatriz Edmondson    Physical Exam   Temp: 98.5  F (36.9  C) Temp src: Oral BP: 125/89 Pulse: 63   Resp: 18 SpO2: 98 % O2 Device: None (Room air)    Vitals:    06/25/22 1341 06/26/22 0050   Weight: 86.2 kg (190 lb) 88.2 kg (194 lb 8 oz)     Vital Signs with Ranges  Temp:  [98.5  F (36.9  C)] 98.5  F (36.9  C)  Pulse:  [] 63  Resp:  [8-24] 18  BP: (107-145)/() 125/89  SpO2:  [96 %-99 %] 98 %  No intake/output data recorded.    Constitutional: Awake, alert, cooperative, no apparent distress.  Eyes: Conjunctiva and pupils examined and normal.  HEENT: Moist mucous membranes, normal dentition.  Respiratory: Clear to auscultation bilaterally, no crackles or wheezing.  Cardiovascular: Regular rhythm, rate in upper 50s, normal S1 and S2, and no murmur noted.  GI: Soft, non-distended, non-tender, normal bowel sounds.  Lymph/Hematologic: No anterior cervical or supraclavicular adenopathy.  Skin: No rashes, no cyanosis, no edema.  Musculoskeletal: No joint swelling, erythema or tenderness.  Neurologic: Cranial nerves 2-12 intact, normal strength and sensation.  Psychiatric: Alert, oriented to person, place and time, no obvious anxiety or depression.    Discharge Disposition   Discharged to home  Condition at discharge: Stable    Consultations This Hospital Stay   PHARMACY IP CONSULT  PHARMACY IP CONSULT  CARDIOLOGY IP CONSULT    Time Spent on this Encounter   IMeera DO, personally saw the patient today and spent greater than 30 minutes discharging this patient.    Discharge Orders      Follow-Up with [facility]iology [facility]      Reason for your hospital stay    Atrial fibrillation, resolved with addition of metoprolol     Activity    Your activity upon discharge: activity as tolerated     Discharge Instructions    1.  You were starting on metoprolol tartrate in hospital, but will start taking the long acting version of this medication (metoprolol succinate) starting 6/26 evening. Sometimes people can feel a little tired with this medication, so taking it at night may be better tolerated than taking in the morning.    2. Take a full dose aspirin (325mg) daily. This can be taken in the morning or night, whichever is most convenient     Follow-up and recommended labs and tests     Follow up with primary care provider, Beatriz Edmondson, within 7 days for hospital follow- up, new diagnosis and medication change.  No follow up labs or test are needed.    Referral to [facility] ordered for one month from now.     Diet    Follow this diet upon discharge: Regular Diet Adult     Discharge Medications   Current Discharge Medication List      START taking these medications    Details   aspirin (ASA) 325 MG tablet Take 1 tablet (325 mg) by mouth daily  Qty: 30 tablet, Refills: 0    Comments: Future refills by PCP Dr. Beatriz Edmondson with phone number [phone].  Associated Diagnoses: Atrial fibrillation with rapid ventricular response (H)      metoprolol succinate ER (TOPROL XL) 50 MG 24 hr tablet Take 1 tablet (50 mg) by mouth every evening  Qty: 30 tablet, Refills: 0    Comments: Future refills by PCP Dr. Beatriz Edmondson with phone number [phone].  Associated Diagnoses: Atrial fibrillation with rapid ventricular response (H)         CONTINUE these medications which have NOT CHANGED    Details   multivitamin, therapeutic with minerals (MULTI-VITAMIN) TABS tablet Take 1 tablet by mouth daily  Qty: 100 tablet, Refills: 0    Associated Diagnoses: Encounter for routine adult health examination without abnormal findings           Allergies   No Known Allergies  Data   Most Recent 3 CBC's:  Recent Labs   Lab Test 06/26/22  0014 06/25/22  1351   WBC 5.7 4.6   HGB 15.1 16.5   MCV 94 98   * 143*      Most  Recent 3 BMP's:  Recent Labs   Lab Test 06/26/22  0631 06/25/22  1351 04/18/22  0928    139 137   POTASSIUM 3.5 3.7 4.1   CHLORIDE 107 109 103   CO2 30 29 30   BUN 16 21 21   CR 0.84 0.92 0.81   ANIONGAP 4 1* 4   TIMOTEO 8.7 8.9 9.9   * 112* 96     Most Recent 2 LFT's:  Recent Labs   Lab Test 04/18/22  0928   AST 21   ALT 31   ALKPHOS 55   BILITOTAL 0.7     Most Recent INR's and Anticoagulation Dosing History:  Anticoagulation Dose History    There is no flowsheet data to display.       Most Recent 3 Troponin's:No lab results found.  Most Recent Cholesterol Panel:  Recent Labs   Lab Test 04/18/22  0928   CHOL 178   LDL 85   HDL 81   TRIG 58     Most Recent 6 Bacteria Isolates From Any Culture (See EPIC Reports for Culture Details):  Recent Labs   Lab Test 12/10/19  1355 03/13/19  1018   CULT No large colony Mycoplasma species isolated  Ureaplasma species isolated* No growth     Most Recent TSH, T4 and A1c Labs:  Recent Labs   Lab Test 06/25/22  1351   TSH 1.59     Results for orders placed or performed during the hospital encounter of 06/25/22   -Cardioversion External    Narrative    Trierweiler, Chad A, MD     6/25/2022  8:05 PM  [facility]    -Cardioversion External    Date/Time: 6/25/2022 2:59 PM  Performed by: Trierweiler, Chad A, MD  Authorized by: Trierweiler, Chad A, MD     Risks, benefits and alternatives discussed.    ED EVALUATION:      I have performed an Emergency Department Evaluation including taking a   history and physical examination, this evaluation will be documented in   the electronic medical record for this ED encounter.      ASA Class: Class 1- healthy patient    Mallampati: Grade 1- soft palate, uvula, tonsillar pillars, and   posterior pharyngeal wall visible    NPO Status: not NPO, emergent situation    UNIVERSAL PROTOCOL   Site Marked: NA  Prior Images Obtained and Reviewed:  NA  Required items: Required blood products, implants, devices and special    equipment available    Patient identity confirmed:  Verbally with patient  Patient was reevaluated immediately before administering moderate or deep   sedation or anesthesia  Confirmation Checklist:  Patient's identity using two indicators  Time out: Immediately prior to the procedure a time out was called    Universal Protocol: the Joint Commission Universal Protocol was followed      SEDATION  Patient Sedated: Yes    Sedation Type:  Moderate (conscious) sedation  Sedation:  Propofol  Vital signs: Vital signs monitored during sedation      PRE-PROCEDURE DETAILS:     Cardioversion basis:  Emergent    Rhythm:  Atrial fibrillation    Electrode placement:  Anterior-posterior  Attempt one:     Cardioversion mode:  Synchronous    Waveform:  Biphasic    Shock (Joules):  200    Shock outcome:  No change in rhythm  Attempt two:     Cardioversion mode:  Synchronous    Waveform:  Biphasic    Shock (Joules):  200    Shock outcome:  No change in rhythm  Post-procedure details:     Patient status:  Awake      PROCEDURE    Patient Tolerance:  Patient tolerated the procedure well with no immediate   complications  Length of time physician/provider present for 1:1 monitoring during   sedation: 15

## 2022-06-26 NOTE — PROGRESS NOTES
RECEIVING UNIT ED HANDOFF REVIEW    ED Nurse Handoff Report was reviewed by: Kinza Meier RN on June 25, 2022 at 11:31 PM

## 2022-06-26 NOTE — ED NOTES
Pt HR up to 130-140s. Called hospitalist Dr Stover and updated, blood pressures stable. Will give Po metoprolol.

## 2022-06-26 NOTE — PLAN OF CARE
Pt is A&Ox4, independent in the room, regular diet. Tele is SR. Heparin gtts stopped mid shift. Echo done this shift, see results. Okayed for discharge by Dr. Caballero and cardiologists. Changed to XR metoprolol and Aspirin- Pharmacy address highlighted in discharge instructions. Instructions gone over and Pt verbalized understanding. All IV's removed. Belongings gathered and sent w/. Pt refused assistance to door and requested to walk w/ partner.

## 2022-06-27 ENCOUNTER — PATIENT OUTREACH (OUTPATIENT)
Dept: CARE COORDINATION | Facility: CLINIC | Age: 62
End: 2022-06-27

## 2022-06-27 DIAGNOSIS — Z71.89 OTHER SPECIFIED COUNSELING: ICD-10-CM

## 2022-06-27 NOTE — PROGRESS NOTES
Clinic Care Coordination Contact  [facility]: Post-Discharge Note  SITUATION                                                      Admission:    Admission Date: 06/25/22   Reason for Admission: Palpitations  Discharge:   Discharge Date: 06/26/22  Discharge Diagnosis: New onset afib with RVR, resolved    BACKGROUND                                                      Per hospital discharge summary and inpatient provider notes:Gabe Moon was admitted on 6/25/2022.  The following problems were addressed during his hospitalization:     New onset afib with RVR  * No known hx of cardiovascular disease. Does some form of physical activity daily. Recently under a lot of stress, working out more often, getting less sleep, drinking caffeine and had wine night prior to onset of atrial fib.  * New onset palpitations, fast HR and mild lightheadedness on day of admission while hiking. Sx persisted despite rest.   * In ED, was found to be in rapid afib with -140s. ED MD attempted cardioversion x2 but did not convert and remained in rapid afib. Started on diltiazem gtt. Labs unremarkable, trop neg, TSH nl.   * Converted to NSR 6/25 night. Dilt gtt turned off, started on metoprolol 25mg BID  * 6/26 Echo: EF 55-60%, no regional WMA, trace TR, MR. Technically difficult.  -- transition to metoprolol succinate 50mg qpm, first dose 6/26 evening  -- CHADS2 VASc score is 0, discharge on 325mg ASA once daily  -- discussed with cardiology, recommend follow up with [facility] in one month  -- follow up with PCP within one week to discuss medication change, new diagnosis  -- consideration for ziopatch in future to monitor for afib burden and need for ongoing BB/ASA therapy     Thrombocytopenia, mild  * Platelet count 143 on admit, stable at 142 on day of discharge  - Defer further monitoring to PCP         ASSESSMENT      Enrollment  Primary Care Care Coordination Status: Declined    Discharge Assessment  How are  "you doing now that you are home?: \" I am doing ok \"  How are your symptoms? (Red Flag symptoms escalate to triage hotline per guidelines): Improved  Do you feel your condition is stable enough to be safe at home until your provider visit?: Yes  Does the patient have their discharge instructions? : Yes  Does the patient have questions regarding their discharge instructions? : No  Were you started on any new medications or were there changes to any of your previous medications? : Yes  Does the patient have all of their medications?: Yes  Do you have questions regarding any of your medications? : No  Do you have all of your needed medical supplies or equipment (DME)?  (i.e. oxygen tank, CPAP, cane, etc.): Yes  Discharge follow-up appointment scheduled within 14 calendar days? : Yes  Discharge Follow Up Appointment Date: 07/05/22  Discharge Follow Up Appointment Scheduled with?: Primary Care Provider    Post-op ([facility] Only)  If the patient had a surgery or procedure, do they have any questions for a nurse?: No             PLAN                                                      Outpatient Plan: Follow up with primary care provider, Beatriz Edmondson, within 7 days for hospital follow- up, new diagnosis and medication change.  No follow up labs or test are needed.    Future Appointments   Date Time Provider Department Center   7/5/2022  5:20 PM Beatriz Edmondson MD UP UP   7/7/2022 12:00 PM Timothy Whitt PT [facility]   8/5/2022 10:20 AM Timothy Whitt PT [facility]         For any urgent concerns, please contact our 24 hour nurse triage line: [phone]                "

## 2022-06-30 NOTE — PROGRESS NOTES
"Gabe is a 62 year old who is being evaluated via a billable video visit.      How would you like to obtain your AVS? MyChart  If the video visit is dropped, the invitation should be resent by: Send to e-mail at: [email or web address]  Will anyone else be joining your video visit? No         Assessment & Plan       ICD-10-CM    1. Atrial fibrillation with rapid ventricular response (H)  I48.91    2. Family history of arrhythmia  Z82.49       61yo with new onset paroxysmal a.fib during recent non-strenuous hike, but preceded by increased alcohol the night prior, increased caffeine for past couple years, and increased strenuous exercise the week prior.  Strong fam h/o of a.fib with identical twin and mother.      Back in regular rhythm prior to discharge with the metoprolol (cardioversion x 2 did not convert prior).  Reassuring cardiac echo prior to discharge.      He is doing well now on metoprolol XL 50mg at night and asa 325 mg/d.  Mild initial se's resolved.    Would cont off caffeine and etoh until discussion with cardiology, but okay to start exercising again.  Would start at 50-70% of normal exertion, and work up gradually however.    Will call if having trouble scheduling with cardiology.    Beatriz Edmondson MD  [facility]        Jenni Bernal is a 62 year old, presenting for the following health issues:  Hospital F/U      HPI     Post Discharge Outreach 6/27/2022   Admission Date 6/25/2022   Reason for Admission Palpitations   Discharge Date 6/26/2022   Discharge Diagnosis New onset afib with RVR, resolved   How are you doing now that you are home? \" I am doing ok \"   How are your symptoms? (Red Flag symptoms escalate to triage hotline per guidelines) Improved   Do you feel your condition is stable enough to be safe at home until your provider visit? Yes   Does the patient have their discharge instructions?  Yes   Does the patient have questions regarding their discharge " instructions?  No   Were you started on any new medications or were there changes to any of your previous medications?  Yes   Does the patient have all of their medications? Yes   Do you have questions regarding any of your medications?  No   Do you have all of your needed medical supplies or equipment (DME)?  (i.e. oxygen tank, CPAP, cane, etc.) Yes   Discharge follow-up appointment scheduled within 14 calendar days?  Yes   Discharge Follow Up Appointment Date 7/5/2022   Discharge Follow Up Appointment Scheduled with? Primary Care Provider     Hospital Follow-up Visit:    Hospital/Nursing Home/IP Rehab Facility: [facility]  Date of Admission: 06/25/2022  Date of Discharge: 06/26/2022  Reason(s) for Admission: New onset afib with RVR, resolved  Thrombocytopenia, mild      Out for a hike, heart palpitations, fluttering- felt really abnl.  Stopped the hike, called wife, and went right to , did EKG, sent to [facility].    Cardioversion attempted x 2, didn't work.  Then went to diltiazem with b-blocker.    Metoprolol worked, ~1 1/2 hr after starting it.  Did overnight with heparin.  Did echo the next am- no issues.    Discharged on the metoprolol 50mg qpm and asa 325mg in am.    --No personal h/o of a.fib or palpitations, but his twin brother had a.fib, and  Mother had it in her 60s.  --Has been really stressed, had more etoh the night prior than usual (1 bottle of wine, usually couple drinks on weekend evenings).  None since hospitalization.  --Has been drinking more caffeine with pandemic- 2-3 cups/day.  None since hospitalization.  --Had been exercising very strenuously, lifting, for several days prior to episode.  Hike itself wasn't that strenuous.  No exercise since hospitalization.        Was your hospitalization related to COVID-19? No   Problems taking medications regularly:  None  Medication changes since discharge: None  Problems adhering to non-medication therapy:  None    Summary  of hospitalization:  [facility] discharge summary reviewed  Diagnostic Tests/Treatments reviewed.    Follow up needed: Cardiology 1 month after discharge- has not been called, he will call to schedule appt  Other Healthcare Providers Involved in Patient s Care:         Specialist appointment - as above  Update since discharge: improved. Post Discharge Medication Reconciliation: discharge medications reconciled, continue medications without change.  Plan of care communicated with patient         Review of Systems   Constitutional, HEENT, cardiovascular, pulmonary, GI, , musculoskeletal, neuro, skin, endocrine and psych systems are negative, except as otherwise noted.      Objective     Vitals:  No vitals were obtained today due to virtual visit.    Physical Exam   GENERAL: Healthy, alert and no distress  EYES: Eyes grossly normal to inspection.  No discharge or erythema, or obvious scleral/conjunctival abnormalities.  RESP: No audible wheeze, cough, or visible cyanosis.  No visible retractions or increased work of breathing.    SKIN: Visible skin clear. No significant rash, abnormal pigmentation or lesions.  NEURO: Cranial nerves grossly intact.  Mentation and speech appropriate for age.  PSYCH: Mentation appears normal, affect normal/bright, judgement and insight intact, normal speech and appearance well-groomed.    Admission on 06/25/2022, Discharged on 06/26/2022   Component Date Value Ref Range Status     Ventricular Rate 06/25/2022 132  BPM Final     Atrial Rate 06/25/2022 170  BPM Final     QRS Duration 06/25/2022 94  ms Final     QT 06/25/2022 266  ms Final     QTc 06/25/2022 394  ms Final     R AXIS 06/25/2022 69  degrees Final     T Axis 06/25/2022 -16  degrees Final     Interpretation ECG 06/25/2022    Final                    Value:Atrial fibrillation with rapid ventricular response  Incomplete right bundle branch block  Nonspecific ST abnormality  Abnormal QRS-T angle, consider primary T  wave abnormality  Abnormal ECG  No previous ECGs available  Confirmed by GENERATED REPORT, COMPUTER ([id number]),  Katherine Morrison ([id number]) on 6/25/2022 7:16:20 PM       Sodium 06/25/2022 139  133 - 144 mmol/L Final     Potassium 06/25/2022 3.7  3.4 - 5.3 mmol/L Final     Chloride 06/25/2022 109  94 - 109 mmol/L Final     Carbon Dioxide (CO2) 06/25/2022 29  20 - 32 mmol/L Final     Anion Gap 06/25/2022 1 (A) 3 - 14 mmol/L Final     Urea Nitrogen 06/25/2022 21  7 - 30 mg/dL Final     Creatinine 06/25/2022 0.92  0.66 - 1.25 mg/dL Final     Calcium 06/25/2022 8.9  8.5 - 10.1 mg/dL Final     Glucose 06/25/2022 112 (A) 70 - 99 mg/dL Final     GFR Estimate 06/25/2022 >90  >60 mL/min/1.73m2 Final    Effective December 21, 2021 eGFRcr in adults is calculated using the 2021 CKD-EPI creatinine equation which includes age and gender (Nai et al., NEJ, DOI: 10.1056/JUKZbq7625478)     Troponin I High Sensitivity 06/25/2022 12  <79 ng/L Final    This Troponin-I result was obtained using a Siemens Dimension Vista High Sensitivity Troponin-I assay (TNIH). Effective 11/23/21, nine labs/sites in the [facility] switched from a Siemens [place] Troponin I assay (CTNI) to a Siemens [place] High-Sensitivity Troponin I assay (TNIH).     WBC Count 06/25/2022 4.6  4.0 - 11.0 10e3/uL Final     RBC Count 06/25/2022 5.07  4.40 - 5.90 10e6/uL Final     Hemoglobin 06/25/2022 16.5  13.3 - 17.7 g/dL Final     Hematocrit 06/25/2022 49.6  40.0 - 53.0 % Final     MCV 06/25/2022 98  78 - 100 fL Final     MCH 06/25/2022 32.5  26.5 - 33.0 pg Final     MCHC 06/25/2022 33.3  31.5 - 36.5 g/dL Final     RDW 06/25/2022 12.4  10.0 - 15.0 % Final     Platelet Count 06/25/2022 143 (A) 150 - 450 10e3/uL Final     % Neutrophils 06/25/2022 64  % Final     % Lymphocytes 06/25/2022 23  % Final     % Monocytes 06/25/2022 10  % Final     % Eosinophils 06/25/2022 2  % Final     % Basophils 06/25/2022 1  % Final     % Immature Granulocytes 06/25/2022 0  %  Final     NRBCs per 100 WBC 06/25/2022 0  <1 /100 Final     Absolute Neutrophils 06/25/2022 3.0  1.6 - 8.3 10e3/uL Final     Absolute Lymphocytes 06/25/2022 1.1  0.8 - 5.3 10e3/uL Final     Absolute Monocytes 06/25/2022 0.5  0.0 - 1.3 10e3/uL Final     Absolute Eosinophils 06/25/2022 0.1  0.0 - 0.7 10e3/uL Final     Absolute Basophils 06/25/2022 0.0  0.0 - 0.2 10e3/uL Final     Absolute Immature Granulocytes 06/25/2022 0.0  <=0.4 10e3/uL Final     Absolute NRBCs 06/25/2022 0.0  10e3/uL Final     Hold Specimen 06/25/2022 [facility]   Final     SARS CoV2 PCR 06/25/2022 Negative  Negative Final    NEGATIVE: SARS-CoV-2 (COVID-19) RNA not detected, presumed negative.     TSH 06/25/2022 1.59  0.40 - 4.00 mU/L Final     Magnesium 06/25/2022 2.3  1.6 - 2.3 mg/dL Final     LVEF  06/26/2022 55-60%   Final     Anti Xa Unfractionated Heparin 06/26/2022 0.50  For Reference Range, See Comment IU/mL Final     Ventricular Rate 06/25/2022 62  BPM Final     Atrial Rate 06/25/2022 62  BPM Final     IN Interval 06/25/2022 194  ms Final     QRS Duration 06/25/2022 96  ms Final     QT 06/25/2022 376  ms Final     QTc 06/25/2022 381  ms Final     P Axis 06/25/2022 49  degrees Final     R AXIS 06/25/2022 12  degrees Final     T Axis 06/25/2022 26  degrees Final     Interpretation ECG 06/25/2022    Final                    Value:Sinus rhythm with sinus arrhythmia  Normal ECG  When compared with ECG of 25-JUN-2022 15:16,  Sinus rhythm has replaced Atrial fibrillation  Vent. rate has decreased BY  57 BPM  Confirmed by GENERATED REPORT, COMPUTER ([id number]),  Chelsea Blanco ([id number]) on 6/25/2022 9:38:42 PM       WBC Count 06/26/2022 5.7  4.0 - 11.0 10e3/uL Final     RBC Count 06/26/2022 4.64  4.40 - 5.90 10e6/uL Final     Hemoglobin 06/26/2022 15.1  13.3 - 17.7 g/dL Final     Hematocrit 06/26/2022 43.7  40.0 - 53.0 % Final     MCV 06/26/2022 94  78 - 100 fL Final     MCH 06/26/2022 32.5  26.5 - 33.0 pg Final     MCHC 06/26/2022 34.6  31.5 - 36.5 g/dL  Final     RDW 06/26/2022 12.4  10.0 - 15.0 % Final     Platelet Count 06/26/2022 142 (A) 150 - 450 10e3/uL Final     Anti Xa Unfractionated Heparin 06/26/2022 0.38  For Reference Range, See Comment IU/mL Final     Sodium 06/26/2022 141  133 - 144 mmol/L Final     Potassium 06/26/2022 3.5  3.4 - 5.3 mmol/L Final     Chloride 06/26/2022 107  94 - 109 mmol/L Final     Carbon Dioxide (CO2) 06/26/2022 30  20 - 32 mmol/L Final     Anion Gap 06/26/2022 4  3 - 14 mmol/L Final     Urea Nitrogen 06/26/2022 16  7 - 30 mg/dL Final     Creatinine 06/26/2022 0.84  0.66 - 1.25 mg/dL Final     Calcium 06/26/2022 8.7  8.5 - 10.1 mg/dL Final     Glucose 06/26/2022 102 (A) 70 - 99 mg/dL Final     GFR Estimate 06/26/2022 >90  >60 mL/min/1.73m2 Final    Effective December 21, 2021 eGFRcr in adults is calculated using the 2021 CKD-EPI creatinine equation which includes age and gender (aNi et al., NEJM, DOI: 10.1056/AZTOky2923457)             Video-Visit Details    Video Start Time: 5:59 PM    Type of service:  Video Visit    Video End Time:6:17 PM    Originating Location (pt. Location): Home    Distant Location (provider location):  [facility]     Platform used for Video Visit: Claudine Schreiber

## 2022-07-05 ENCOUNTER — VIRTUAL VISIT (OUTPATIENT)
Dept: FAMILY MEDICINE | Facility: CLINIC | Age: 62
End: 2022-07-05
Payer: COMMERCIAL

## 2022-07-05 DIAGNOSIS — Z82.49 FAMILY HISTORY OF ARRHYTHMIA: ICD-10-CM

## 2022-07-05 DIAGNOSIS — I48.91 ATRIAL FIBRILLATION WITH RAPID VENTRICULAR RESPONSE (H): Primary | ICD-10-CM

## 2022-07-05 PROCEDURE — 99495 TRANSJ CARE MGMT MOD F2F 14D: CPT | Mod: GT | Performed by: FAMILY MEDICINE

## 2022-07-07 ENCOUNTER — THERAPY VISIT (OUTPATIENT)
Dept: PHYSICAL THERAPY | Facility: CLINIC | Age: 62
End: 2022-07-07
Payer: COMMERCIAL

## 2022-07-07 DIAGNOSIS — M25.551 HIP PAIN, RIGHT: Primary | ICD-10-CM

## 2022-07-07 DIAGNOSIS — M25.552 HIP PAIN, LEFT: ICD-10-CM

## 2022-07-07 PROCEDURE — 97110 THERAPEUTIC EXERCISES: CPT | Mod: GP | Performed by: PHYSICAL THERAPIST

## 2022-07-07 NOTE — PROGRESS NOTES
DISCHARGE REPORT    Progress reporting period is from 4/25/22 to 7/7/22.       SUBJECTIVE  Subjective changes noted by patient:  Subjective: Gabe reports that his hips as well as his neck/trap area are feeling really good overall. Hips are still in a really good spot overall. Feels ready to continue HEP on his own.    Current pain level is 0/10 Current Pain level: 0/10.     Previous pain level was  2/10 Initial Pain level: 2/10.   Changes in function:  Yes (See Goal flowsheet attached for changes in current functional level)  Adverse reaction to treatment or activity: None    OBJECTIVE  Changes noted in objective findings:  Yes, improved ROM, strength, function, mobility and control  Objective: Full hip ROM all planes pain free negative special tests, hip abduciton 5-/5 francisco, hip extension 5/5 francisco, knee flexion 5/5 francisco. Near full cervical ROM all planes with some stiffness in end range, FUll shoulder ROM all planes. ER/IR/flexion/abduction 5/5 francisco shoulder pain free     ASSESSMENT/PLAN  Updated problem list and treatment plan: Diagnosis 1:  Francisco hip pain, neck/shoulder pain  Decreased strength - therapeutic exercise, therapeutic activities and home program  Impaired muscle performance - neuro re-education  Decreased function - therapeutic activities  STG/LTGs have been met or progress has been made towards goals:  Yes (See Goal flow sheet completed today.)  Assessment of Progress: The patient has met all of their long term goals.  Self Management Plans:  Patient has been instructed in a home treatment program.  Patient is independent in a home treatment program.  Patient  has been instructed in self management of symptoms.  Patient is independent in self management of symptoms.  I have re-evaluated this patient and find that the nature, scope, duration and intensity of the therapy is appropriate for the medical condition of the patient.  Gabe continues to require the following intervention to meet STG and LTG's:   PT intervention is no longer required to meet STG/LTG.    Recommendations:  This patient is ready to be discharged from therapy and continue their home treatment program.    Please refer to the daily flowsheet for treatment today, total treatment time and time spent performing 1:1 timed codes.      Inquires  Timothy Whitt PT, DPT, CSCS  Physical Therapist  [facility], [address] [address] [phone]

## 2022-07-18 ENCOUNTER — OFFICE VISIT (OUTPATIENT)
Dept: CARDIOLOGY | Facility: CLINIC | Age: 62
End: 2022-07-18
Attending: HOSPITALIST
Payer: COMMERCIAL

## 2022-07-18 VITALS
SYSTOLIC BLOOD PRESSURE: 125 MMHG | HEART RATE: 72 BPM | DIASTOLIC BLOOD PRESSURE: 84 MMHG | OXYGEN SATURATION: 98 % | WEIGHT: 194 LBS | HEIGHT: 75 IN | BODY MASS INDEX: 24.12 KG/M2

## 2022-07-18 DIAGNOSIS — I48.91 ATRIAL FIBRILLATION WITH RAPID VENTRICULAR RESPONSE (H): ICD-10-CM

## 2022-07-18 PROCEDURE — 99204 OFFICE O/P NEW MOD 45 MIN: CPT | Performed by: INTERNAL MEDICINE

## 2022-07-18 PROCEDURE — 93000 ELECTROCARDIOGRAM COMPLETE: CPT | Performed by: INTERNAL MEDICINE

## 2022-07-18 NOTE — PROGRESS NOTES
HPI and Plan:   See dictation          Orders Placed This Encounter   Procedures     Follow-Up with Cardiology     EKG 12-lead complete w/read - Clinics (performed today)     Exercise Stress Echocardiogram       No orders of the defined types were placed in this encounter.      There are no discontinued medications.      Encounter Diagnosis   Name Primary?     Atrial fibrillation with rapid ventricular response (H)        CURRENT MEDICATIONS:  Current Outpatient Medications   Medication Sig Dispense Refill     aspirin (ASA) 325 MG tablet Take 1 tablet (325 mg) by mouth daily 30 tablet 0     metoprolol succinate ER (TOPROL XL) 50 MG 24 hr tablet Take 1 tablet (50 mg) by mouth every evening 30 tablet 0     multivitamin, therapeutic with minerals (MULTI-VITAMIN) TABS tablet Take 1 tablet by mouth daily 100 tablet 0       ALLERGIES   No Known Allergies    PAST MEDICAL HISTORY:  Past Medical History:   Diagnosis Date     Atrial fibrillation with rapid ventricular response (H) 6/25/2022     Blood in semen      Inguinal hernia of right side with gangrene 2003     Low back pain      Prostate infection      STD (sexually transmitted disease)      Varicose vein of leg        PAST SURGICAL HISTORY:  Past Surgical History:   Procedure Laterality Date     ABDOMEN SURGERY  may 1991    testicular varicose vein repair     COLONOSCOPY  May 2013    MRI Virtual Colonoscopy     COLONOSCOPY N/A 4/1/2021    Procedure: COLONOSCOPY;  Surgeon: Gage Mckee MD;  Location:  GI     HERNIA REPAIR  2003    inguinal     LIGATE VEIN      '93 scrotum, '14 left leg     SOFT TISSUE SURGERY  Jan 2002    Lumbar spine issues, facet joint sundrome       FAMILY HISTORY:  Family History   Problem Relation Age of Onset     Breast Cancer Mother      Alzheimer Disease Mother      Depression Mother      Mental Illness Mother         Bi-Polar, Dementia, passed away age 84     Arrhythmia Mother         fast HR, continued on meds, no blood thinners      "Coronary Artery Disease Mother      Atrial fibrillation Mother      Diabetes Father         Type II     Hypertension Father         managed with Medication     Prostate Cancer Father         diagnosis at age 77, now 88     Osteoarthritis Father         hip replacement (overwt)     Coronary Artery Disease Father         Congestive Heart Failure, diagnonsed 2019     Osteoarthritis Paternal Grandfather         hip replacements     Prostate Cancer Brother 55     Arrhythmia Brother 60        ER visit, meds x 10 days; reported as afib     Atrial fibrillation Brother      Prostate Cancer Brother         diagnosis 2021, treatment pending     Colon Cancer Maternal Cousin 53         of colon cancer at 58       SOCIAL HISTORY:  Social History     Socioeconomic History     Marital status:      Spouse name: None     Number of children: None     Years of education: None     Highest education level: None   Occupational History     Comment: office furniture sales, [employer]   Tobacco Use     Smoking status: Never Smoker     Smokeless tobacco: Never Used   Substance and Sexual Activity     Alcohol use: Yes     Comment: Drinks EtOH on occasion but denies daily use     Drug use: No     Comment: once daily     Sexual activity: Yes     Partners: Female     Birth control/protection: Condom, I.U.D.   Other Topics Concern     Parent/sibling w/ CABG, MI or angioplasty before 65F 55M? No       Review of Systems:  Skin:          Eyes:         ENT:         Respiratory:          Cardiovascular:         Gastroenterology:        Genitourinary:         Musculoskeletal:         Neurologic:         Psychiatric:         Heme/Lymph/Imm:         Endocrine:           Physical Exam:  Vitals: /84   Pulse 72   Ht 1.892 m (6' 2.5\")   Wt 88 kg (194 lb)   SpO2 98%   BMI 24.57 kg/m      Constitutional:  cooperative, alert and oriented, well developed, well nourished, in no acute distress thin      Skin:  warm and dry to the " touch, no apparent skin lesions or masses noted          Head:  normocephalic        Eyes:  pupils equal and round, conjunctivae and lids unremarkable, sclera white, no xanthalasma, EOMS intact, no nystagmus        Lymph:No Cervical lymphadenopathy present     ENT:  no pallor or cyanosis, dentition good        Neck:  carotid pulses are full and equal bilaterally, JVP normal, no carotid bruit        Respiratory:  normal breath sounds, clear to auscultation, normal A-P diameter, normal symmetry, normal respiratory excursion, no use of accessory muscles         Cardiac: regular rhythm, normal S1/S2, no S3 or S4, apical impulse not displaced, no murmurs, gallops or rubs                pulses full and equal, no bruits auscultated                                        GI:  not assessed this visit        Extremities and Muscular Skeletal:  no deformities, clubbing, cyanosis, erythema observed              Neurological:  no gross motor deficits;affect appropriate        Psych:  Alert and Oriented x 3        CC  Meera Caballero, DO  [address] [place] [address]

## 2022-07-18 NOTE — PROGRESS NOTES
Service Date: 07/18/2022    INDICATION FOR CARDIAC CONSULTATION:  Symptomatic atrial fibrillation with rapid ventricular response.    HISTORY OF PRESENT ILLNESS:  It is my pleasure to see your patient, Gabe Moon, who is a 62-year-old patient with no past cardiac history.  He was admitted to hospital on 05/25/2022 having noticed that his heartbeat was beating extremely fast and he had a quivering sensation in his chest and also felt weak while he was walking.  The episode did not go away, and because he was feeling relatively unwell with this rapid heartbeat, he went into the emergency room.  In the emergency room, the patient was found to be in atrial fibrillation with rapid ventricular response.  The ventricular rate was 135 beats per minute.  They attempted to cardiovert the patient twice, but he did not convert back to sinus rhythm.  He was placed on a diltiazem drip and then he was given metoprolol and he spontaneously converted back to sinus rhythm.  Neither diltiazem nor metoprolol convert people to sinus rhythm.  The patient had an echocardiogram performed the following day and this was unremarkable.  It was quite a normal looking echo.  His atria were normal in size.  His ejection fraction was normal with an EF of 55%-60%.  The aortic root was at the upper limits of normal at 3.7 cm, but he is a tall man.  Diastolic function was also normal, and as I mentioned, no significant valvular heart disease was noted.  On further questioning, he did notice maybe in the previous week or so a few short episodes of flopping in his chest, but no prolonged episodes.  His CHADS-VASc score is 0.  He does have a family history, however, of atrial fibrillation.  His twin brother developed atrial fibrillation last year and his mother also had atrial fibrillation.  A 12-lead electrocardiogram at rest in sinus rhythm shows that he has an RSR prime pattern in V1 and V2, which is a normal variant, and he has an  intraventricular conduction defect, which is nonspecific, in lead III.  There was no evidence of preexcitation.  At the time of his admission, his potassium was normal at 3.7, although it did drop to lower limit of normal at 3.5.  His magnesium was normal at 2.3 and his TSH was also normal at 1.59, so there was no obvious inciting factor to the development of atrial fibrillation.  His partner has noticed that he occasionally will snore, but she has not noticed any obstruction to breathing or apneic episodes and he does not have daytime somnolence typically, so obstructive sleep apnea would be less likely.  He also has a BMI of 24.5 which will be also somewhat against but does not rule out obstructive sleep apnea.    IMPRESSION:    1.  One episode of symptomatic paroxysmal atrial fibrillation with rapid ventricular response, which spontaneously converted back to sinus rhythm but failed 2 attempts at d electrical cardioversion.  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  However, it is suspicious that his twin brother developed atrial fibrillation last year and his mother also had atrial fibrillation.  10% of atrial fibrillation is familial.  He has had no other inciting factors for atrial fibrillation and his CHADS-VASc score is 0.    PLAN:    1.  We had a long discussion about where to proceed.  Firstly, this patient needs to be ruled out for ischemia, so we will perform a stress echocardiogram.  2.  I would continue him on aspirin, but a baby aspirin is sufficient.  3.  I would continue him on the beta-blocker medication for the time being so that his heart rate does not go fast if he goes into atrial fibrillation again.  4.  We will need to observe this patient over the next few months.  This may be a once-off episode of holiday heart syndrome.  In  other words, patients who are fatigued and tired and then consume some alcohol then triggers atrial fibrillation.  However, it also may be the opening salvos of recurrent paroxysmal atrial fibrillation and time will tell with respect to this.  I have asked him to try and make lifestyle changes, so that he gets good night's sleep, that he is not fatigued, we did have a discussion about eliminating caffeinated products and that he should drink alcohol in moderation given that this may have been a triggering factor.  Alcohol is a known trigger factor for atrial fibrillation in some patients.  5.  I will have the patient return to see me in 12 weeks' time, but if he has further episodes of atrial fibrillation, we will then need to discuss the use of antiarrhythmic drugs such as flecainide, which prevent the development of atrial fibrillation, and if he had a breakthrough on that, then one could consider atrial fibrillation ablation procedure.  All of these were discussed at our visit.    It is my pleasure to be involved in the care of this very nice patient.  I look forward to seeing him again, but as always, he has been told to contact me if he has any questions or any concerns.    Emmanuel Pugh MD    cc:  Beatriz Edmondson MD  [facility]  [address] [facility] [facility] [address], [facility] [address]  [place]  [address]    Meera Caballero DO  [facility]  [address] [place]  [address]    Emmanuel Seo MD, [facility]        D: 2022   T: 2022   MT: pedro pablo    Name:     CANDACE JULES  MRN:      [id number]        Account:      [id number]   :      1960           Service Date: 2022       Document: [id number]

## 2022-07-18 NOTE — LETTER
7/18/2022    Beatriz Edmondson MD  [address]  [facility] [address]    RE: Gabe Moon       Dear Colleague,     I had the pleasure of seeing Gabe Moon in the [facility].  HPI and Plan:   See dictation          Orders Placed This Encounter   Procedures     Follow-Up with Cardiology     EKG 12-lead complete w/read - Clinics (performed today)     Exercise Stress Echocardiogram       No orders of the defined types were placed in this encounter.      There are no discontinued medications.      Encounter Diagnosis   Name Primary?     Atrial fibrillation with rapid ventricular response (H)        CURRENT MEDICATIONS:  Current Outpatient Medications   Medication Sig Dispense Refill     aspirin (ASA) 325 MG tablet Take 1 tablet (325 mg) by mouth daily 30 tablet 0     metoprolol succinate ER (TOPROL XL) 50 MG 24 hr tablet Take 1 tablet (50 mg) by mouth every evening 30 tablet 0     multivitamin, therapeutic with minerals (MULTI-VITAMIN) TABS tablet Take 1 tablet by mouth daily 100 tablet 0       ALLERGIES   No Known Allergies    PAST MEDICAL HISTORY:  Past Medical History:   Diagnosis Date     Atrial fibrillation with rapid ventricular response (H) 6/25/2022     Blood in semen      Inguinal hernia of right side with gangrene 2003     Low back pain      Prostate infection      STD (sexually transmitted disease)      Varicose vein of leg        PAST SURGICAL HISTORY:  Past Surgical History:   Procedure Laterality Date     ABDOMEN SURGERY  may 1991    testicular varicose vein repair     COLONOSCOPY  May 2013    MRI Virtual Colonoscopy     COLONOSCOPY N/A 4/1/2021    Procedure: COLONOSCOPY;  Surgeon: Gage Mckee MD;  Location: [facility]     HERNIA REPAIR  2003    inguinal     LIGATE VEIN      '93 scrotum, '14 left leg     SOFT TISSUE SURGERY  Jan 2002    Lumbar spine issues, facet joint sundrome       FAMILY HISTORY:  Family History   Problem Relation Age of Onset     Breast  Cancer Mother      Alzheimer Disease Mother      Depression Mother      Mental Illness Mother         Bi-Polar, Dementia, passed away age 84     Arrhythmia Mother         fast HR, continued on meds, no blood thinners     Coronary Artery Disease Mother      Atrial fibrillation Mother      Diabetes Father         Type II     Hypertension Father         managed with Medication     Prostate Cancer Father         diagnosis at age 77, now 88     Osteoarthritis Father         hip replacement (overwt)     Coronary Artery Disease Father         Congestive Heart Failure, diagnonsed      Osteoarthritis Paternal Grandfather         hip replacements     Prostate Cancer Brother 55     Arrhythmia Brother 60        ER visit, meds x 10 days; reported as afib     Atrial fibrillation Brother      Prostate Cancer Brother         diagnosis 2021, treatment pending     Colon Cancer Maternal Cousin 53         of colon cancer at 58       SOCIAL HISTORY:  Social History     Socioeconomic History     Marital status:      Spouse name: None     Number of children: None     Years of education: None     Highest education level: None   Occupational History     Comment: office furniture sales, [employer]   Tobacco Use     Smoking status: Never Smoker     Smokeless tobacco: Never Used   Substance and Sexual Activity     Alcohol use: Yes     Comment: Drinks EtOH on occasion but denies daily use     Drug use: No     Comment: once daily     Sexual activity: Yes     Partners: Female     Birth control/protection: Condom, I.U.D.   Other Topics Concern     Parent/sibling w/ CABG, MI or angioplasty before 65F 55M? No     Review of Systems:  Skin:          Eyes:         ENT:         Respiratory:          Cardiovascular:         Gastroenterology:        Genitourinary:         Musculoskeletal:         Neurologic:         Psychiatric:         Heme/Lymph/Imm:         Endocrine:           Physical Exam:  Vitals: /84   Pulse 72   Ht  "1.892 m (6' 2.5\")   Wt 88 kg (194 lb)   SpO2 98%   BMI 24.57 kg/m      Constitutional:  cooperative, alert and oriented, well developed, well nourished, in no acute distress thin      Skin:  warm and dry to the touch, no apparent skin lesions or masses noted          Head:  normocephalic        Eyes:  pupils equal and round, conjunctivae and lids unremarkable, sclera white, no xanthalasma, EOMS intact, no nystagmus        Lymph:No Cervical lymphadenopathy present     ENT:  no pallor or cyanosis, dentition good        Neck:  carotid pulses are full and equal bilaterally, JVP normal, no carotid bruit        Respiratory:  normal breath sounds, clear to auscultation, normal A-P diameter, normal symmetry, normal respiratory excursion, no use of accessory muscles         Cardiac: regular rhythm, normal S1/S2, no S3 or S4, apical impulse not displaced, no murmurs, gallops or rubs                pulses full and equal, no bruits auscultated                                        GI:  not assessed this visit        Extremities and Muscular Skeletal:  no deformities, clubbing, cyanosis, erythema observed              Neurological:  no gross motor deficits;affect appropriate        Psych:  Alert and Oriented x 3      CC  Meera Caballero, DO  [address] [place] [address]    Thank you for allowing me to participate in the care of your patient.    Sincerely,   Emmanuel Pugh MD, MD   [facility]  "

## 2022-07-19 ENCOUNTER — MYC MEDICAL ADVICE (OUTPATIENT)
Dept: CARDIOLOGY | Facility: CLINIC | Age: 62
End: 2022-07-19

## 2022-07-20 DIAGNOSIS — I48.91 ATRIAL FIBRILLATION WITH RAPID VENTRICULAR RESPONSE (H): ICD-10-CM

## 2022-07-20 RX ORDER — METOPROLOL SUCCINATE 50 MG/1
50 TABLET, EXTENDED RELEASE ORAL EVERY EVENING
Qty: 90 TABLET | Refills: 3 | Status: SHIPPED | OUTPATIENT
Start: 2022-07-20 | End: 2023-05-11

## 2022-07-20 RX ORDER — METOPROLOL SUCCINATE 50 MG/1
50 TABLET, EXTENDED RELEASE ORAL EVERY EVENING
Qty: 90 TABLET | Refills: 3 | Status: CANCELLED | OUTPATIENT
Start: 2022-07-20

## 2022-07-20 NOTE — TELEPHONE ENCOUNTER
This suggests that the patient has a tendency to atrial fibrillation and his initial episode was not a once off event. If the heart rate was irregular and felt to be similar to his previous event that brought him to hospital but obviously of shorter duration, then I would start him on flecainide 50 mg twice a day which is a starting dose. He should then have a treadmill EKG in 5 to 7 days after starting the flecainide. I would keep the stress echo in August which is a more sensitive test to detect ischemia than the EKG. He needs to be on the betablocker with the flecainide when doing the stress tests. Thx

## 2022-07-21 NOTE — TELEPHONE ENCOUNTER
Called pt with recommendations from .benita. Pt states he felt his pulse when his heart was racing & states it was regular and states it was not anything like the episode that brought him to the hospital. Pt will continue to monitor, advised to check pulse if he gets it again & to be sure to note if it was regular or irregular. Pt verbalized understanding. Concepcion RAHMAN

## 2022-08-03 ENCOUNTER — HOSPITAL ENCOUNTER (OUTPATIENT)
Dept: CARDIOLOGY | Facility: CLINIC | Age: 62
Discharge: HOME | End: 2022-08-03
Attending: INTERNAL MEDICINE | Admitting: INTERNAL MEDICINE
Payer: COMMERCIAL

## 2022-08-03 DIAGNOSIS — I48.91 ATRIAL FIBRILLATION WITH RAPID VENTRICULAR RESPONSE (H): ICD-10-CM

## 2022-08-03 PROCEDURE — 93016 CV STRESS TEST SUPVJ ONLY: CPT | Performed by: INTERNAL MEDICINE

## 2022-08-03 PROCEDURE — 93017 CV STRESS TEST TRACING ONLY: CPT

## 2022-08-03 PROCEDURE — C8928 TTE W OR W/O FOL W/CON,STRES: HCPCS

## 2022-08-03 PROCEDURE — 93350 STRESS TTE ONLY: CPT | Mod: 26 | Performed by: INTERNAL MEDICINE

## 2022-08-03 PROCEDURE — 255N000002 HC RX 255 OP 636: Performed by: INTERNAL MEDICINE

## 2022-08-03 PROCEDURE — 93018 CV STRESS TEST I&R ONLY: CPT | Performed by: INTERNAL MEDICINE

## 2022-08-03 PROCEDURE — 93325 DOPPLER ECHO COLOR FLOW MAPG: CPT | Mod: 26 | Performed by: INTERNAL MEDICINE

## 2022-08-03 PROCEDURE — 93321 DOPPLER ECHO F-UP/LMTD STD: CPT | Mod: 26 | Performed by: INTERNAL MEDICINE

## 2022-08-03 RX ADMIN — HUMAN ALBUMIN MICROSPHERES AND PERFLUTREN 9 ML: 10; .22 INJECTION, SOLUTION INTRAVENOUS at 09:27

## 2022-08-12 ENCOUNTER — TELEPHONE (OUTPATIENT)
Dept: CARDIOLOGY | Facility: CLINIC | Age: 62
End: 2022-08-12

## 2022-08-12 NOTE — TELEPHONE ENCOUNTER
"Reviewed stress echo showing   This was a normal stress echocardiogram with no evidence of stress-induced ischemia.   A short run of SVT was noted at peak exercise.   A ventricular triplet and occasional PVCs were noted during recovery.    Per office note dated 7/18/22, Dr. Pugh recommended, \"We had a long discussion about where to proceed.  Firstly, this patient needs to be ruled out for ischemia, so we will perform a stress echocardiogram.\"     Pt has appt to see Dr. Pugh 10/17/22; will message to review. Concepcion RAHMAN   "

## 2022-08-15 NOTE — TELEPHONE ENCOUNTER
Called pt with results of stress echo & recommendations from Dr. Pugh. Pt asking about taking Aleve or ibuprofen occasionally for joint pain after working out or doing yard work. Advised OK to take, but advised to use the smallest dose for the shortest time period necessary. Concepcion RAHMAN

## 2022-09-11 ENCOUNTER — HEALTH MAINTENANCE LETTER (OUTPATIENT)
Age: 62
End: 2022-09-11

## 2022-10-11 ENCOUNTER — VIRTUAL VISIT (OUTPATIENT)
Dept: FAMILY MEDICINE | Facility: CLINIC | Age: 62
End: 2022-10-11
Payer: COMMERCIAL

## 2022-10-11 DIAGNOSIS — U07.1 INFECTION DUE TO 2019 NOVEL CORONAVIRUS: Primary | ICD-10-CM

## 2022-10-11 PROCEDURE — 99213 OFFICE O/P EST LOW 20 MIN: CPT | Mod: CS | Performed by: FAMILY MEDICINE

## 2022-10-11 NOTE — PROGRESS NOTES
Gabe is a 62 year old who is being evaluated via a billable telephone visit.      What phone number would you like to be contacted at? [phone]   How would you like to obtain your AVS? MyChart    Assessment & Plan     Infection due to 2019 novel coronavirus    - nirmatrelvir and ritonavir (PAXLOVID) therapy pack; Take 3 tablets by mouth 2 times daily for 5 days (Take 2 Nirmatrelvir tablets and 1 Ritonavir tablet twice daily for 5 days)  Patient recently was tested positive for COVID infection.  Started patient on antiviral.  Medication ordered.  Instructed to stay hydrated.  Symptomatic care discussed.  If any symptomatic worsening noted, instructed to seek medical care immediately.  Patient agrees to the      Alfonso Graham MD  [facility]    Jenni Bernal is a 62 year old, presenting for the following health issues:  Covid Concern      HPI       COVID-19 Symptom Review  How many days ago did these symptoms start? 10/10/2022    Are any of the following symptoms significant for you?    New or worsening difficulty breathing? No    Worsening cough? Yes, it's a dry cough.     Fever or chills? Yes, I felt feverish or had chills.    Headache: YES    Sore throat: YES    Chest pain: No    Diarrhea: No    Body aches? No    What treatments has patient tried? Cough syrup   Does patient live in a nursing home, group home, or shelter? No  Does patient have a way to get food/medications during quarantined?                   Review of Systems   INTEGUMENTARY/SKIN: NEGATIVE for worrisome rashes, moles or lesions  GI: NEGATIVE for nausea, abdominal pain, heartburn, or change in bowel habits      Objective           Vitals:  No vitals were obtained today due to virtual visit.    Physical Exam   healthy, alert and no distress  PSYCH: Alert and oriented times 3; coherent speech, normal   rate and volume, able to articulate logical thoughts, able   to abstract reason, no tangential thoughts, no  hallucinations   or delusions  His affect is normal  RESP: No cough, no audible wheezing, able to talk in full sentences  Remainder of exam unable to be completed due to telephone visits                Phone call duration: 10 minutes

## 2022-11-03 ENCOUNTER — OFFICE VISIT (OUTPATIENT)
Dept: CARDIOLOGY | Facility: CLINIC | Age: 62
End: 2022-11-03
Attending: INTERNAL MEDICINE
Payer: COMMERCIAL

## 2022-11-03 VITALS
WEIGHT: 195.8 LBS | DIASTOLIC BLOOD PRESSURE: 75 MMHG | HEART RATE: 79 BPM | HEIGHT: 75 IN | SYSTOLIC BLOOD PRESSURE: 136 MMHG | BODY MASS INDEX: 24.34 KG/M2

## 2022-11-03 DIAGNOSIS — I48.91 ATRIAL FIBRILLATION WITH RAPID VENTRICULAR RESPONSE (H): ICD-10-CM

## 2022-11-03 PROCEDURE — 99214 OFFICE O/P EST MOD 30 MIN: CPT | Performed by: PHYSICIAN ASSISTANT

## 2022-11-03 NOTE — PROGRESS NOTES
[id number]  30 minutes spent on the date of the encounter doing chart review, review of test results, interpretation of tests, patient visit and documentation     HPI and Plan:   See dictation    Orders this Visit:  Orders Placed This Encounter   Procedures     Follow-Up with Cardiology     Orders Placed This Encounter   Medications     Aspirin 81 MG CAPS     Medications Discontinued During This Encounter   Medication Reason     aspirin (ASA) 325 MG tablet Dose adjustment         Encounter Diagnosis   Name Primary?     Atrial fibrillation with rapid ventricular response (H)        CURRENT MEDICATIONS:  Current Outpatient Medications   Medication Sig Dispense Refill     Aspirin 81 MG CAPS        metoprolol succinate ER (TOPROL XL) 50 MG 24 hr tablet Take 1 tablet (50 mg) by mouth every evening 90 tablet 3     multivitamin, therapeutic with minerals (MULTI-VITAMIN) TABS tablet Take 1 tablet by mouth daily 100 tablet 0       ALLERGIES   No Known Allergies    PAST MEDICAL HISTORY:  Past Medical History:   Diagnosis Date     Atrial fibrillation with rapid ventricular response (H) 6/25/2022     Blood in semen      Inguinal hernia of right side with gangrene 2003     Low back pain      Prostate infection      STD (sexually transmitted disease)      Varicose vein of leg        PAST SURGICAL HISTORY:  Past Surgical History:   Procedure Laterality Date     ABDOMEN SURGERY  may 1991    testicular varicose vein repair     COLONOSCOPY  May 2013    MRI Virtual Colonoscopy     COLONOSCOPY N/A 4/1/2021    Procedure: COLONOSCOPY;  Surgeon: Gage Mckee MD;  Location:  GI     HERNIA REPAIR  2003    inguinal     LIGATE VEIN      '93 scrotum, '14 left leg     SOFT TISSUE SURGERY  Jan 2002    Lumbar spine issues, facet joint sundrome       FAMILY HISTORY:  Family History   Problem Relation Age of Onset     Breast Cancer Mother      Alzheimer Disease Mother      Depression Mother      Mental Illness Mother         Bi-Polar, Dementia,  "passed away age 84     Arrhythmia Mother         fast HR, continued on meds, no blood thinners     Coronary Artery Disease Mother      Atrial fibrillation Mother      Diabetes Father         Type II     Hypertension Father         managed with Medication     Prostate Cancer Father         diagnosis at age 77, now 88     Osteoarthritis Father         hip replacement (overwt)     Coronary Artery Disease Father         Congestive Heart Failure, diagnonsed      Osteoarthritis Paternal Grandfather         hip replacements     Prostate Cancer Brother 55     Arrhythmia Brother 60        ER visit, meds x 10 days; reported as afib     Atrial fibrillation Brother      Prostate Cancer Brother         diagnosis 2021, treatment pending     Colon Cancer Maternal Cousin 53         of colon cancer at 58       SOCIAL HISTORY:  Social History     Socioeconomic History     Marital status:      Spouse name: None     Number of children: None     Years of education: None     Highest education level: None   Occupational History     Comment: office furniture sales, [employer]   Tobacco Use     Smoking status: Never     Smokeless tobacco: Never   Substance and Sexual Activity     Alcohol use: Not Currently     Comment: Drinks EtOH on occasion but denies daily use     Drug use: No     Comment: once daily     Sexual activity: Yes     Partners: Female     Birth control/protection: Condom, I.U.D.   Other Topics Concern     Parent/sibling w/ CABG, MI or angioplasty before 65F 55M? No       Review of Systems:  Skin:        Eyes:       ENT:       Respiratory:  Negative    Cardiovascular:  Negative;palpitations;chest pain;lightheadedness;dizziness;syncope or near-syncope;cyanosis;fatigue;edema    Gastroenterology:      Genitourinary:       Musculoskeletal:       Neurologic:       Psychiatric:       Heme/Lymph/Imm:       Endocrine:  Negative      Physical Exam:  Vitals: /75   Pulse 79   Ht 1.892 m (6' 2.5\")   Wt 88.8 " kg (195 lb 12.8 oz)   BMI 24.80 kg/m     Please refer to dictation for physical exam    Recent Lab Results: all reviewed today  CBC RESULTS:  Lab Results   Component Value Date    WBC 5.7 06/26/2022    RBC 4.64 06/26/2022    HGB 15.1 06/26/2022    HCT 43.7 06/26/2022    MCV 94 06/26/2022    MCH 32.5 06/26/2022    MCHC 34.6 06/26/2022    RDW 12.4 06/26/2022     (L) 06/26/2022       BMP RESULTS:  Lab Results   Component Value Date     06/26/2022     02/19/2021    POTASSIUM 3.5 06/26/2022    POTASSIUM 4.0 02/19/2021    CHLORIDE 107 06/26/2022    CHLORIDE 105 02/19/2021    CO2 30 06/26/2022    CO2 27 02/19/2021    ANIONGAP 4 06/26/2022    ANIONGAP 7 02/19/2021     (H) 06/26/2022    GLC 85 02/19/2021    BUN 16 06/26/2022    BUN 23 02/19/2021    CR 0.84 06/26/2022    CR 0.82 02/19/2021    GFRESTIMATED >90 06/26/2022    GFRESTIMATED >90 02/19/2021    GFRESTBLACK >90 02/19/2021    TIMOTEO 8.7 06/26/2022    TIMOTEO 9.7 02/19/2021        INR RESULTS:  No results found for: INR        CC  Emmanuel Pugh MD  [address]  [place] [id number]

## 2022-11-03 NOTE — PROGRESS NOTES
Service Date: 11/03/2022    PRIMARY CARDIOLOGIST:  Dr. Emmanuel Pugh.    REASON FOR VISIT:  Paroxysmal atrial fibrillation.    HISTORY OF PRESENT ILLNESS:  Mr. Moon is a pleasant 62-year-old gentleman who has a minimal past medical history to essentially none, who had felt well until he was admitted to the hospital in May with a sense of extremely hard heart beating and weakness.  As it did not go away, he went into the ER and was found to be in AFib with RVR.  Cardioversion was attempted x2, but he did not convert into sinus rhythm.  He was started on medication and converted spontaneously.  He underwent an echocardiogram, which was normal.  Of note, his brother had been diagnosed with AFib a year earlier and he is a twin.  He was seen by Dr. Pugh.  His workup was otherwise negative and the plan was to rule him out for ischemia with a stress echocardiogram, keep him on a baby aspirin and start him on a beta blocker.  If he had recurrent AFib, there was going to be an option for flecainide if the stress test was negative.  Of note, his initial episode was when he was fatigued and had had a fair amount of alcohol.    He comes in today, and he is overall feeling well.  Since his initial episode, he had another episode of his heart pounding hard while he was in a stressful situation at work.  This was not confirmed AFib, but it lasted 20 minutes and then resolved.  He was not syncopal or presyncopal with this.  When that was noted, he called, he had a stress echocardiogram done that showed a short run of SVT at peak exercise and a triplet and occasional PVCs during recovery.  But otherwise, it was an excellent stress test with the patient going 10 minutes and 22 seconds for 13 METs with a max heart rate of 166 beats per minute.  He had no symptoms during that stress test.  He states that outside his hard pounding during the business situation, he has felt well.  He even had COVID a couple of weeks ago  and had to change his clinic visit, and with this, he did not develop palpitations.  From a lifestyle standpoint, he has decreased his caffeine.  He has decreased his alcohol significantly, and he has cut back on the intensity of his exercise, which he usually does, with heart rates getting up to the 150s or 160s consistently.  He asked his significant other if he pauses or snores during sleep and she did not note any of these things.    PHYSICAL EXAMINATION:  GENERAL:  Well-developed, well-nourished gentleman, in no acute distress.  HEENT:  Normocephalic, atraumatic.  HEART:  Regular in rate and rhythm without murmur, rub or gallop.  LUNGS:  Clear, without wheezes, rales, or rhonchi.  EXTREMITIES:  Without peripheral edema.    ASSESSMENT AND PLAN:   1.  Paroxysmal atrial fibrillation, clinically in sinus rhythm today, with 1 documented episode and then another possible episode while in a stressful event at work.  We outlined 3 pathways today, the 1 being conservative and staying on metoprolol, the second being more aggressive and putting him on flecainide as an antiarrhythmic and then repeating a stress test in a week to look for proarrhythmias.  He would have to remain on metoprolol with this and the 3rd sending him to EP for an AFib ablation.  After discussing risks and benefits of each, we elected to continue with his current approach.  We also discussed him getting an Apple watch, so we can more closely monitor for events and if he has pounding, we can document if they are actually AFib or another sensation.  These are relatively infrequent, so there is no benefit to putting on a Zio patch at this time.  We discussed that he can return to exercise, although I would like him keeping his heart rates less than 150 or even around 140.  In general, he is free to otherwise return to normal activities and with a plan to moderate alcohol.    At this point, we will see how he does with this situation and have him follow  up with Dr. Pugh in 6 months.  He will call sooner with any concerns or more AFib episodes or if he would like to change course.    Thank you for allowing me to participate in this delightful patient's care.    Merry Yen PA-C        D: 2022   T: 2022   MT: al    Name:     CANDACE JULES  MRN:      [id number]        Account:      [id number]   :      1960           Service Date: 2022       Document: [id number]

## 2022-11-03 NOTE — PATIENT INSTRUCTIONS
Thank you for visiting with me today.    We discussed: for now we'll continue current medications.  If you develop more episodes of afib we can be more aggressive with medications and try flecainide.     Consider getting an apple watch to monitor for afib episodes.      Results: stress test is normal.      Follow up: with Dr. Nain Seo or myself in about 6 months.        Please call my nurse, Laila, at [phone] with any questions or concerns.    Scheduling phone number: [phone]  Reminder: Please bring in all current medications, over the counter supplements and vitamin bottles to your next appointment.

## 2022-11-03 NOTE — LETTER
11/3/2022    Beatriz Edmondson MD  [address]  [facility] [address]    RE: Gabe Moon       Dear Colleague,     I had the pleasure of seeing Gabe Moon in the [facility].  [id number]  30 minutes spent on the date of the encounter doing chart review, review of test results, interpretation of tests, patient visit and documentation     HPI and Plan:   See dictation    Orders this Visit:  Orders Placed This Encounter   Procedures     Follow-Up with Cardiology     Orders Placed This Encounter   Medications     Aspirin 81 MG CAPS     Medications Discontinued During This Encounter   Medication Reason     aspirin (ASA) 325 MG tablet Dose adjustment         Encounter Diagnosis   Name Primary?     Atrial fibrillation with rapid ventricular response (H)        CURRENT MEDICATIONS:  Current Outpatient Medications   Medication Sig Dispense Refill     Aspirin 81 MG CAPS        metoprolol succinate ER (TOPROL XL) 50 MG 24 hr tablet Take 1 tablet (50 mg) by mouth every evening 90 tablet 3     multivitamin, therapeutic with minerals (MULTI-VITAMIN) TABS tablet Take 1 tablet by mouth daily 100 tablet 0       ALLERGIES   No Known Allergies    PAST MEDICAL HISTORY:  Past Medical History:   Diagnosis Date     Atrial fibrillation with rapid ventricular response (H) 6/25/2022     Blood in semen      Inguinal hernia of right side with gangrene 2003     Low back pain      Prostate infection      STD (sexually transmitted disease)      Varicose vein of leg        PAST SURGICAL HISTORY:  Past Surgical History:   Procedure Laterality Date     ABDOMEN SURGERY  may 1991    testicular varicose vein repair     COLONOSCOPY  May 2013    MRI Virtual Colonoscopy     COLONOSCOPY N/A 4/1/2021    Procedure: COLONOSCOPY;  Surgeon: Gage Mckee MD;  Location:  GI     HERNIA REPAIR  2003    inguinal     LIGATE VEIN      '93 scrotum, '14 left leg     SOFT TISSUE SURGERY  Jan 2002    Lumbar spine  issues, facet joint sundrome       FAMILY HISTORY:  Family History   Problem Relation Age of Onset     Breast Cancer Mother      Alzheimer Disease Mother      Depression Mother      Mental Illness Mother         Bi-Polar, Dementia, passed away age 84     Arrhythmia Mother         fast HR, continued on meds, no blood thinners     Coronary Artery Disease Mother      Atrial fibrillation Mother      Diabetes Father         Type II     Hypertension Father         managed with Medication     Prostate Cancer Father         diagnosis at age 77, now 88     Osteoarthritis Father         hip replacement (overwt)     Coronary Artery Disease Father         Congestive Heart Failure, diagnonsed      Osteoarthritis Paternal Grandfather         hip replacements     Prostate Cancer Brother 55     Arrhythmia Brother 60        ER visit, meds x 10 days; reported as afib     Atrial fibrillation Brother      Prostate Cancer Brother         diagnosis 2021, treatment pending     Colon Cancer Maternal Cousin 53         of colon cancer at 58       SOCIAL HISTORY:  Social History     Socioeconomic History     Marital status:      Spouse name: None     Number of children: None     Years of education: None     Highest education level: None   Occupational History     Comment: office furniture sales, [employer]   Tobacco Use     Smoking status: Never     Smokeless tobacco: Never   Substance and Sexual Activity     Alcohol use: Not Currently     Comment: Drinks EtOH on occasion but denies daily use     Drug use: No     Comment: once daily     Sexual activity: Yes     Partners: Female     Birth control/protection: Condom, I.U.D.   Other Topics Concern     Parent/sibling w/ CABG, MI or angioplasty before 65F 55M? No       Review of Systems:  Skin:        Eyes:       ENT:       Respiratory:  Negative    Cardiovascular:  Negative;palpitations;chest pain;lightheadedness;dizziness;syncope or near-syncope;cyanosis;fatigue;edema   "  Gastroenterology:      Genitourinary:       Musculoskeletal:       Neurologic:       Psychiatric:       Heme/Lymph/Imm:       Endocrine:  Negative      Physical Exam:  Vitals: /75   Pulse 79   Ht 1.892 m (6' 2.5\")   Wt 88.8 kg (195 lb 12.8 oz)   BMI 24.80 kg/m     Please refer to dictation for physical exam    Recent Lab Results: all reviewed today  CBC RESULTS:  Lab Results   Component Value Date    WBC 5.7 06/26/2022    RBC 4.64 06/26/2022    HGB 15.1 06/26/2022    HCT 43.7 06/26/2022    MCV 94 06/26/2022    MCH 32.5 06/26/2022    MCHC 34.6 06/26/2022    RDW 12.4 06/26/2022     (L) 06/26/2022       BMP RESULTS:  Lab Results   Component Value Date     06/26/2022     02/19/2021    POTASSIUM 3.5 06/26/2022    POTASSIUM 4.0 02/19/2021    CHLORIDE 107 06/26/2022    CHLORIDE 105 02/19/2021    CO2 30 06/26/2022    CO2 27 02/19/2021    ANIONGAP 4 06/26/2022    ANIONGAP 7 02/19/2021     (H) 06/26/2022    GLC 85 02/19/2021    BUN 16 06/26/2022    BUN 23 02/19/2021    CR 0.84 06/26/2022    CR 0.82 02/19/2021    GFRESTIMATED >90 06/26/2022    GFRESTIMATED >90 02/19/2021    GFRESTBLACK >90 02/19/2021    TIMOTEO 8.7 06/26/2022    TIMOTEO 9.7 02/19/2021        INR RESULTS:  No results found for: INR        CC  Emmanuel Pugh MD  [address]  [place] [address]        Service Date: 11/03/2022    PRIMARY CARDIOLOGIST:  Dr. Emmanuel Pugh.    REASON FOR VISIT:  Paroxysmal atrial fibrillation.    HISTORY OF PRESENT ILLNESS:  Mr. Moon is a pleasant 62-year-old gentleman who has a minimal past medical history to essentially none, who had felt well until he was admitted to the hospital in May with a sense of extremely hard heart beating and weakness.  As it did not go away, he went into the ER and was found to be in AFib with RVR.  Cardioversion was attempted x2, but he did not convert into sinus rhythm.  He was started on medication and converted spontaneously.  He underwent an " echocardiogram, which was normal.  Of note, his brother had been diagnosed with AFib a year earlier and he is a twin.  He was seen by Dr. Pugh.  His workup was otherwise negative and the plan was to rule him out for ischemia with a stress echocardiogram, keep him on a baby aspirin and start him on a beta blocker.  If he had recurrent AFib, there was going to be an option for flecainide if the stress test was negative.  Of note, his initial episode was when he was fatigued and had had a fair amount of alcohol.    He comes in today, and he is overall feeling well.  Since his initial episode, he had another episode of his heart pounding hard while he was in a stressful situation at work.  This was not confirmed AFib, but it lasted 20 minutes and then resolved.  He was not syncopal or presyncopal with this.  When that was noted, he called, he had a stress echocardiogram done that showed a short run of SVT at peak exercise and a triplet and occasional PVCs during recovery.  But otherwise, it was an excellent stress test with the patient going 10 minutes and 22 seconds for 13 METs with a max heart rate of 166 beats per minute.  He had no symptoms during that stress test.  He states that outside his hard pounding during the business situation, he has felt well.  He even had COVID a couple of weeks ago and had to change his clinic visit, and with this, he did not develop palpitations.  From a lifestyle standpoint, he has decreased his caffeine.  He has decreased his alcohol significantly, and he has cut back on the intensity of his exercise, which he usually does, with heart rates getting up to the 150s or 160s consistently.  He asked his significant other if he pauses or snores during sleep and she did not note any of these things.    PHYSICAL EXAMINATION:  GENERAL:  Well-developed, well-nourished gentleman, in no acute distress.  HEENT:  Normocephalic, atraumatic.  HEART:  Regular in rate and rhythm without  murmur, rub or gallop.  LUNGS:  Clear, without wheezes, rales, or rhonchi.  EXTREMITIES:  Without peripheral edema.    ASSESSMENT AND PLAN:   1.  Paroxysmal atrial fibrillation, clinically in sinus rhythm today, with 1 documented episode and then another possible episode while in a stressful event at work.  We outlined 3 pathways today, the 1 being conservative and staying on metoprolol, the second being more aggressive and putting him on flecainide as an antiarrhythmic and then repeating a stress test in a week to look for proarrhythmias.  He would have to remain on metoprolol with this and the 3rd sending him to EP for an AFib ablation.  After discussing risks and benefits of each, we elected to continue with his current approach.  We also discussed him getting an Apple watch, so we can more closely monitor for events and if he has pounding, we can document if they are actually AFib or another sensation.  These are relatively infrequent, so there is no benefit to putting on a Zio patch at this time.  We discussed that he can return to exercise, although I would like him keeping his heart rates less than 150 or even around 140.  In general, he is free to otherwise return to normal activities and with a plan to moderate alcohol.    At this point, we will see how he does with this situation and have him follow up with Dr. Pugh in 6 months.  He will call sooner with any concerns or more AFib episodes or if he would like to change course.    Thank you for allowing me to participate in this delightful patient's care.    Merry Yen PA-C        D: 2022   T: 2022   MT: al    Name:     CANDACE JULES  MRN:      [id number]        Account:      [id number]   :      1960           Service Date: 2022       Document: [id number]      Thank you for allowing me to participate in the care of your patient.      Sincerely,     Merry Yen PA-C     [facility]  [facility]  cc:   Emmanuel Pugh MD  [address]  [place] [address]

## 2022-12-15 ENCOUNTER — CARE COORDINATION (OUTPATIENT)
Dept: CARDIOLOGY | Facility: CLINIC | Age: 62
End: 2022-12-15

## 2022-12-15 NOTE — PROGRESS NOTES
12/15/22 Message from patient stating he has questions.   Called back to patient and left message to call back.  Laila Flood RN 12/15/22 3:21 PM    12/15/2022 Called back from patient. Gabe reports he is currently monitoring on his Apple watch for atrial fibrillation per plan at visit with Merry Yen PA-C and would like more information of what to watch for. Reviewed palpitations versus fast heart rate and to monitor for triggers. Patient states understanding being aware to seek Emergency room care for episodes of rapid heart rate or symptoms. Patient will call or [employer] message if further review requested.  Laila Flood RN 12/15/22 3:51 PM

## 2023-01-31 ENCOUNTER — E-VISIT (OUTPATIENT)
Dept: FAMILY MEDICINE | Facility: CLINIC | Age: 63
End: 2023-01-31
Payer: COMMERCIAL

## 2023-01-31 DIAGNOSIS — M79.622 PAIN OF LEFT UPPER ARM: ICD-10-CM

## 2023-01-31 DIAGNOSIS — R20.2 NUMBNESS AND TINGLING IN LEFT ARM: Primary | ICD-10-CM

## 2023-01-31 DIAGNOSIS — R20.0 NUMBNESS AND TINGLING IN LEFT ARM: Primary | ICD-10-CM

## 2023-01-31 PROCEDURE — 99421 OL DIG E/M SVC 5-10 MIN: CPT | Performed by: FAMILY MEDICINE

## 2023-02-01 NOTE — PATIENT INSTRUCTIONS
Thank you for choosing us for your care.     Based on your symptoms, I think it would be better for you to start with a consultation at our spine clinic to review the diagnosis and ideal treatment plan.  I went ahead and placed the referral, and they should be calling you to schedule.  In the meantime, you can try icing your neck area, ibuprofen as needed and gentle neck stretching.       Scheduling Instructions: [facility] will call you to coordinate your care as prescribed by your provider. If you don't hear from a representative within 2 business days, please call [phone].         View your full visit summary for details by clicking on the link below.     Graeme Steele MD

## 2023-02-13 ENCOUNTER — IMMUNIZATION (OUTPATIENT)
Dept: NURSING | Facility: CLINIC | Age: 63
End: 2023-02-13
Payer: COMMERCIAL

## 2023-02-13 PROCEDURE — 91313 COVID-19 VACCINE BIVALENT BOOSTER 18+ (MODERNA): CPT

## 2023-02-13 PROCEDURE — 0134A COVID-19 VACCINE BIVALENT BOOSTER 18+ (MODERNA): CPT

## 2023-02-20 ENCOUNTER — TELEPHONE (OUTPATIENT)
Dept: CARDIOLOGY | Facility: CLINIC | Age: 63
End: 2023-02-20
Payer: COMMERCIAL

## 2023-02-20 NOTE — TELEPHONE ENCOUNTER
[facility] Call Center    Phone Message    May a detailed message be left on voicemail: yes     Reason for Call: Form or Letter   Type or form/letter needing completion: [employer] with [employer]  faxed over a Cardiac questionnaire about A-Fib. It is about 2 pages long  Provider: Dr Pugh  Date form needed: By Friday 2/24  Once completed: Fax form to: Fax # [phone]     If they have not heard back by tomorrow they will be calling every 48 hrs to confirm it has been received       Action Taken: Other: Cardiology    Travel Screening: Not Applicable     Thank you!  [facility]

## 2023-02-20 NOTE — TELEPHONE ENCOUNTER
2/20/23 called back to [employer] - left message with case # they provided and our fax number to resend. Did not give out any patient information as awaiting fax to confirm if patient authorized.  Laila Flood RN 02/20/23 3:31 PM

## 2023-02-22 NOTE — TELEPHONE ENCOUNTER
2/22/23 Called to patient. Gabe asks we complete [employer] paperwork and send to insurance company. [employer] paperwork completed and faxed.  Laila Flood RN 02/22/23 1:03 PM

## 2023-03-18 ASSESSMENT — ENCOUNTER SYMPTOMS
CHILLS: 0
DIZZINESS: 0
HEADACHES: 1
HEMATOCHEZIA: 0
NAUSEA: 0
EYE PAIN: 0
DIARRHEA: 0
HEMATURIA: 0
WEAKNESS: 0
HEARTBURN: 0
SHORTNESS OF BREATH: 0
PALPITATIONS: 1
PARESTHESIAS: 0
DYSURIA: 0
NERVOUS/ANXIOUS: 1
CONSTIPATION: 0
FEVER: 0
JOINT SWELLING: 0
MYALGIAS: 0
FREQUENCY: 0
SORE THROAT: 0
COUGH: 0
ABDOMINAL PAIN: 0
ARTHRALGIAS: 1

## 2023-03-21 ENCOUNTER — OFFICE VISIT (OUTPATIENT)
Dept: FAMILY MEDICINE | Facility: CLINIC | Age: 63
End: 2023-03-21
Payer: COMMERCIAL

## 2023-03-21 VITALS
SYSTOLIC BLOOD PRESSURE: 122 MMHG | BODY MASS INDEX: 23.75 KG/M2 | HEIGHT: 75 IN | OXYGEN SATURATION: 100 % | TEMPERATURE: 97.2 F | RESPIRATION RATE: 16 BRPM | WEIGHT: 191 LBS | HEART RATE: 68 BPM | DIASTOLIC BLOOD PRESSURE: 86 MMHG

## 2023-03-21 DIAGNOSIS — Z83.3 FAMILY HISTORY OF DIABETES MELLITUS: ICD-10-CM

## 2023-03-21 DIAGNOSIS — I48.91 ATRIAL FIBRILLATION WITH RAPID VENTRICULAR RESPONSE (H): ICD-10-CM

## 2023-03-21 DIAGNOSIS — Z00.00 ROUTINE GENERAL MEDICAL EXAMINATION AT A HEALTH CARE FACILITY: Primary | ICD-10-CM

## 2023-03-21 LAB
ALBUMIN SERPL BCG-MCNC: 4.5 G/DL (ref 3.5–5.2)
ALP SERPL-CCNC: 54 U/L (ref 40–129)
ALT SERPL W P-5'-P-CCNC: 23 U/L (ref 10–50)
ANION GAP SERPL CALCULATED.3IONS-SCNC: 8 MMOL/L (ref 7–15)
AST SERPL W P-5'-P-CCNC: 26 U/L (ref 10–50)
BILIRUB SERPL-MCNC: 0.6 MG/DL
BUN SERPL-MCNC: 18.9 MG/DL (ref 8–23)
CALCIUM SERPL-MCNC: 9.5 MG/DL (ref 8.8–10.2)
CHLORIDE SERPL-SCNC: 104 MMOL/L (ref 98–107)
CHOLEST SERPL-MCNC: 215 MG/DL
CREAT SERPL-MCNC: 0.88 MG/DL (ref 0.67–1.17)
DEPRECATED HCO3 PLAS-SCNC: 27 MMOL/L (ref 22–29)
GFR SERPL CREATININE-BSD FRML MDRD: >90 ML/MIN/1.73M2
GLUCOSE SERPL-MCNC: 94 MG/DL (ref 70–99)
HBA1C MFR BLD: 5.4 % (ref 0–5.6)
HDLC SERPL-MCNC: 85 MG/DL
LDLC SERPL CALC-MCNC: 118 MG/DL
NONHDLC SERPL-MCNC: 130 MG/DL
POTASSIUM SERPL-SCNC: 4.6 MMOL/L (ref 3.4–5.3)
PROT SERPL-MCNC: 6.9 G/DL (ref 6.4–8.3)
PSA SERPL DL<=0.01 NG/ML-MCNC: 3.1 NG/ML (ref 0–4.5)
SODIUM SERPL-SCNC: 139 MMOL/L (ref 136–145)
TRIGL SERPL-MCNC: 60 MG/DL

## 2023-03-21 PROCEDURE — 99396 PREV VISIT EST AGE 40-64: CPT | Performed by: FAMILY MEDICINE

## 2023-03-21 PROCEDURE — 80061 LIPID PANEL: CPT | Performed by: FAMILY MEDICINE

## 2023-03-21 PROCEDURE — 36415 COLL VENOUS BLD VENIPUNCTURE: CPT | Performed by: FAMILY MEDICINE

## 2023-03-21 PROCEDURE — 80053 COMPREHEN METABOLIC PANEL: CPT | Performed by: FAMILY MEDICINE

## 2023-03-21 PROCEDURE — 83036 HEMOGLOBIN GLYCOSYLATED A1C: CPT | Performed by: FAMILY MEDICINE

## 2023-03-21 PROCEDURE — G0103 PSA SCREENING: HCPCS | Performed by: FAMILY MEDICINE

## 2023-03-21 RX ORDER — METOPROLOL SUCCINATE 50 MG/1
50 TABLET, EXTENDED RELEASE ORAL EVERY EVENING
Qty: 90 TABLET | Refills: 3 | Status: CANCELLED | OUTPATIENT
Start: 2023-03-21

## 2023-03-21 ASSESSMENT — ENCOUNTER SYMPTOMS
JOINT SWELLING: 0
FREQUENCY: 0
HEADACHES: 1
DIARRHEA: 0
MYALGIAS: 0
EYE PAIN: 0
DYSURIA: 0
CHILLS: 0
HEARTBURN: 0
FEVER: 0
NAUSEA: 0
SORE THROAT: 0
NERVOUS/ANXIOUS: 1
DIZZINESS: 0
PARESTHESIAS: 0
SHORTNESS OF BREATH: 0
WEAKNESS: 0
ABDOMINAL PAIN: 0
ARTHRALGIAS: 1
CONSTIPATION: 0
COUGH: 0
HEMATURIA: 0
PALPITATIONS: 1
HEMATOCHEZIA: 0

## 2023-03-21 ASSESSMENT — PAIN SCALES - GENERAL: PAINLEVEL: NO PAIN (0)

## 2023-03-21 NOTE — PROGRESS NOTES
SUBJECTIVE:   CC: Gabe is an 63 year old who presents for preventative health visit.   Patient has been advised of split billing requirements and indicates understanding: Yes  Healthy Habits:     Getting at least 3 servings of Calcium per day:  Yes    Bi-annual eye exam:  Yes    Dental care twice a year:  Yes    Sleep apnea or symptoms of sleep apnea:  Excessive snoring    Diet:  Regular (no restrictions)    Frequency of exercise:  4-5 days/week    Duration of exercise:  45-60 minutes    Taking medications regularly:  Yes    Medication side effects:  None    PHQ-2 Total Score: 0    Additional concerns today:  Yes    1) Hx of  cervicalgia , hips were painful has done  PT through [facility]   2) dad had his hip joints replaced   FH of type 2 DM .    Today's PHQ-2 Score:   PHQ-2 ( 1999 [employer]) 3/18/2023   Q1: Little interest or pleasure in doing things 0   Q2: Feeling down, depressed or hopeless 0   PHQ-2 Score 0   PHQ-2 Total Score (12-17 Years)- Positive if 3 or more points; Administer PHQ-A if positive -   Q1: Little interest or pleasure in doing things Not at all   Q2: Feeling down, depressed or hopeless Not at all   PHQ-2 Score 0           Social History     Tobacco Use     Smoking status: Never     Smokeless tobacco: Never   Substance Use Topics     Alcohol use: Not Currently     Comment: Drinks EtOH on occasion but denies daily use         Alcohol Use 3/18/2023   Prescreen: >3 drinks/day or >7 drinks/week? No   AUDIT SCORE  -     AUDIT - Alcohol Use Disorders Identification Test - Reproduced from the World Health Organization Audit 2001 (Second Edition) 4/14/2022   1.  How often do you have a drink containing alcohol? 2 to 3 times a week   2.  How many drinks containing alcohol do you have on a typical day when you are drinking? 3 or 4   3.  How often do you have five or more drinks on one occasion? Never   4.  How often during the last year have you found that you were not able to stop drinking once you had started?  Never   5.  How often during the last year have you failed to do what was normally expected of you because of drinking? Never   6.  How often during the last year have you needed a first drink in the morning to get yourself going after a heavy drinking session? Never   7.  How often during the last year have you had a feeling of guilt or remorse after drinking? Never   8.  How often during the last year have you been unable to remember what happened the night before because of your drinking? Never   9.  Have you or someone else been injured because of your drinking? No   10. Has a relative, friend, doctor or other health care worker been concerned about your drinking or suggested you cut down? Yes, during the last year   TOTAL SCORE 8       Last PSA:   PSA   Date Value Ref Range Status   02/19/2021 1.90 0 - 4 ug/L Final     Comment:     Assay Method:  Chemiluminescence using Siemens Vista analyzer     Prostate Specific Antigen Screen   Date Value Ref Range Status   04/18/2022 2.66 0.00 - 4.00 ug/L Final       Reviewed orders with patient. Reviewed health maintenance and updated orders accordingly - Yes  Lab work is in process  Labs reviewed in EPIC  BP Readings from Last 3 Encounters:   03/21/23 122/86   11/03/22 136/75   07/18/22 125/84    Wt Readings from Last 3 Encounters:   03/21/23 86.6 kg (191 lb)   11/03/22 88.8 kg (195 lb 12.8 oz)   07/18/22 88 kg (194 lb)                  Patient Active Problem List   Diagnosis     Family history of prostate cancer     Atrial fibrillation with rapid ventricular response (H)     Past Surgical History:   Procedure Laterality Date     ABDOMEN SURGERY  may 1991    testicular varicose vein repair     COLONOSCOPY  May 2013    MRI Virtual Colonoscopy     COLONOSCOPY N/A 4/1/2021    Procedure: COLONOSCOPY;  Surgeon: Gage Mckee MD;  Location:  GI     HERNIA REPAIR  2003    inguinal     LIGATE VEIN      '93 scrotum, '14 left leg     SOFT TISSUE SURGERY  Jan 2002    Lumbar  spine issues, facet joint sundrome       Social History     Tobacco Use     Smoking status: Never     Smokeless tobacco: Never   Substance Use Topics     Alcohol use: Yes     Comment: reduced to 3 drinks per week or less     Family History   Problem Relation Age of Onset     Breast Cancer Mother      Alzheimer Disease Mother      Depression Mother      Mental Illness Mother         Bi-Polar, Dementia, passed away age 84     Arrhythmia Mother         fast HR, continued on meds, no blood thinners     Coronary Artery Disease Mother      Atrial fibrillation Mother      Diabetes Father         Type II     Hypertension Father         managed with Medication     Prostate Cancer Father         diagnosis at age 77, now 88     Osteoarthritis Father         hip replacement (overwt)     Coronary Artery Disease Father         Congestive Heart Failure, diagnonsed      Obesity Father      Osteoarthritis Paternal Grandfather         hip replacements     Prostate Cancer Brother 55     Arrhythmia Brother 60        ER visit, meds x 10 days; reported as afib     Atrial fibrillation Brother      Prostate Cancer Brother         diagnosis 2021, treatment pending     Colon Cancer Maternal Cousin 53         of colon cancer at 58         Current Outpatient Medications   Medication Sig Dispense Refill     Aspirin 81 MG CAPS        metoprolol succinate ER (TOPROL XL) 50 MG 24 hr tablet Take 1 tablet (50 mg) by mouth every evening 90 tablet 3     multivitamin, therapeutic with minerals (MULTI-VITAMIN) TABS tablet Take 1 tablet by mouth daily 100 tablet 0     No Known Allergies  Recent Labs   Lab Test 23  1153 22  0631 22  1351 22  0928 22  0928 21  0936   A1C 5.4  --   --   --   --   --    *  --   --   --  85 96   HDL 85  --   --   --  81 93   TRIG 60  --   --   --  58 49   ALT 23  --   --   --  31  --    CR 0.88 0.84 0.92   < > 0.81 0.82   GFRESTIMATED >90 >90 >90   < > >90 >90    GFRESTBLACK  --   --   --   --   --  >90   POTASSIUM 4.6 3.5 3.7   < > 4.1 4.0   TSH  --   --  1.59  --   --   --     < > = values in this interval not displayed.        Reviewed and updated as needed this visit by clinical staff                  Reviewed and updated as needed this visit by Provider                 Past Medical History:   Diagnosis Date     Atrial fibrillation with rapid ventricular response (H) 6/25/2022     Blood in semen      Inguinal hernia of right side with gangrene 2003     Low back pain      Prostate infection      STD (sexually transmitted disease)      Varicose vein of leg       Past Surgical History:   Procedure Laterality Date     ABDOMEN SURGERY  may 1991    testicular varicose vein repair     COLONOSCOPY  May 2013    MRI Virtual Colonoscopy     COLONOSCOPY N/A 4/1/2021    Procedure: COLONOSCOPY;  Surgeon: Gage Mckee MD;  Location:  GI     HERNIA REPAIR  2003    inguinal     LIGATE VEIN      '93 scrotum, '14 left leg     SOFT TISSUE SURGERY  Jan 2002    Lumbar spine issues, facet joint sundrome       Review of Systems   Constitutional: Negative for chills and fever.   HENT: Negative for congestion, ear pain, hearing loss and sore throat.    Eyes: Negative for pain and visual disturbance.   Respiratory: Negative for cough and shortness of breath.    Cardiovascular: Positive for palpitations. Negative for chest pain and peripheral edema.   Gastrointestinal: Negative for abdominal pain, constipation, diarrhea, heartburn, hematochezia and nausea.   Genitourinary: Negative for dysuria, frequency, genital sores, hematuria, impotence, penile discharge and urgency.   Musculoskeletal: Positive for arthralgias. Negative for joint swelling and myalgias.   Skin: Negative for rash.   Neurological: Positive for headaches. Negative for dizziness, weakness and paresthesias.   Psychiatric/Behavioral: Negative for mood changes. The patient is nervous/anxious.      CONSTITUTIONAL: NEGATIVE for  fever, chills, change in weight  INTEGUMENTARY/SKIN: NEGATIVE for worrisome rashes, moles or lesions  EYES: NEGATIVE for vision changes or irritation  ENT: NEGATIVE for ear, mouth and throat problems  RESP: NEGATIVE for significant cough or SOB  CV: NEGATIVE for chest pain, palpitations or peripheral edema  GI: NEGATIVE for nausea, abdominal pain, heartburn, or change in bowel habits   male: negative for dysuria, hematuria, decreased urinary stream, erectile dysfunction, urethral discharge  MUSCULOSKELETAL: NEGATIVE for significant arthralgias or myalgia  NEURO: NEGATIVE for weakness, dizziness or paresthesias  PSYCHIATRIC: NEGATIVE for changes in mood or affect    OBJECTIVE:   There were no vitals taken for this visit.    Physical Exam  GENERAL: healthy, alert and no distress  EYES: Eyes grossly normal to inspection, PERRL and conjunctivae and sclerae normal  HENT: ear canals and TM's normal, nose and mouth without ulcers or lesions  NECK: no adenopathy, no asymmetry, masses, or scars and thyroid normal to palpation  RESP: lungs clear to auscultation - no rales, rhonchi or wheezes  CV: regular rate and rhythm, normal S1 S2, no S3 or S4, no murmur, click or rub, no peripheral edema and peripheral pulses strong  ABDOMEN: soft, nontender, no hepatosplenomegaly, no masses and bowel sounds normal  MS: no gross musculoskeletal defects noted, no edema  SKIN: no suspicious lesions or rashes  NEURO: Normal strength and tone, mentation intact and speech normal  PSYCH: mentation appears normal, affect normal/bright    Diagnostic Test Results:  Labs reviewed in Epic  Results for orders placed or performed in visit on 03/21/23 (from the past 24 hour(s))   Hemoglobin A1c   Result Value Ref Range    Hemoglobin A1C 5.4 0.0 - 5.6 %   PSA, screen   Result Value Ref Range    Prostate Specific Antigen Screen 3.10 0.00 - 4.50 ng/mL    Narrative    This result is obtained using the Roche Elecsys total PSA method on the jairo [id number]  immunoassay analyzer. Results obtained with different assay methods or kits cannot be used interchangeably.   Comprehensive metabolic panel (BMP + Alb, Alk Phos, ALT, AST, Total. Bili, TP)   Result Value Ref Range    Sodium 139 136 - 145 mmol/L    Potassium 4.6 3.4 - 5.3 mmol/L    Chloride 104 98 - 107 mmol/L    Carbon Dioxide (CO2) 27 22 - 29 mmol/L    Anion Gap 8 7 - 15 mmol/L    Urea Nitrogen 18.9 8.0 - 23.0 mg/dL    Creatinine 0.88 0.67 - 1.17 mg/dL    Calcium 9.5 8.8 - 10.2 mg/dL    Glucose 94 70 - 99 mg/dL    Alkaline Phosphatase 54 40 - 129 U/L    AST 26 10 - 50 U/L    ALT 23 10 - 50 U/L    Protein Total 6.9 6.4 - 8.3 g/dL    Albumin 4.5 3.5 - 5.2 g/dL    Bilirubin Total 0.6 <=1.2 mg/dL    GFR Estimate >90 >60 mL/min/1.73m2   Lipid panel reflex to direct LDL Fasting   Result Value Ref Range    Cholesterol 215 (H) <200 mg/dL    Triglycerides 60 <150 mg/dL    Direct Measure HDL 85 >=40 mg/dL    LDL Cholesterol Calculated 118 (H) <=100 mg/dL    Non HDL Cholesterol 130 (H) <130 mg/dL    Narrative    Cholesterol  Desirable:  <200 mg/dL    Triglycerides  Normal:  Less than 150 mg/dL  Borderline High:  150-199 mg/dL  High:  200-499 mg/dL  Very High:  Greater than or equal to 500 mg/dL    Direct Measure HDL  Female:  Greater than or equal to 50 mg/dL   Male:  Greater than or equal to 40 mg/dL    LDL Cholesterol  Desirable:  <100mg/dL  Above Desirable:  100-129 mg/dL   Borderline High:  130-159 mg/dL   High:  160-189 mg/dL   Very High:  >= 190 mg/dL    Non HDL Cholesterol  Desirable:  130 mg/dL  Above Desirable:  130-159 mg/dL  Borderline High:  160-189 mg/dL  High:  190-219 mg/dL  Very High:  Greater than or equal to 220 mg/dL       ASSESSMENT/PLAN:   (Z00.00) Routine general medical examination at a health care facility  (primary encounter diagnosis)  Comment: is healthy overall , does regular physical exercise and also eats a healthy diet   Plan: REVIEW OF HEALTH MAINTENANCE PROTOCOL ORDERS,         Lipid panel  reflex to direct LDL Fasting, PSA,         screen        Healthy diet and exercises on a regular basis     (I48.91) Atrial fibrillation with rapid ventricular response (H)  Comment: is in sinus rhythm now , on metoprolol , sees cardiology annually , doing well   Plan: Comprehensive metabolic panel (BMP + Alb, Alk         Phos, ALT, AST, Total. Bili, TP)        Will check labs today     (Z83.3) Family history of diabetes mellitus  Comment: will screen   Plan: Hemoglobin A1c        As above       Patient has been advised of split billing requirements and indicates understanding: Yes      COUNSELING:   Reviewed preventive health counseling, as reflected in patient instructions       Regular exercise       Healthy diet/nutrition       Vision screening       Hearing screening        He reports that he has never smoked. He has never used smokeless tobacco.        Felicia Vásquez MD  [facility]

## 2023-03-27 ENCOUNTER — MYC MEDICAL ADVICE (OUTPATIENT)
Dept: FAMILY MEDICINE | Facility: CLINIC | Age: 63
End: 2023-03-27
Payer: COMMERCIAL

## 2023-05-11 ENCOUNTER — OFFICE VISIT (OUTPATIENT)
Dept: CARDIOLOGY | Facility: CLINIC | Age: 63
End: 2023-05-11
Attending: PHYSICIAN ASSISTANT
Payer: COMMERCIAL

## 2023-05-11 VITALS
BODY MASS INDEX: 23.8 KG/M2 | HEART RATE: 84 BPM | WEIGHT: 191.4 LBS | OXYGEN SATURATION: 97 % | HEIGHT: 75 IN | SYSTOLIC BLOOD PRESSURE: 126 MMHG | DIASTOLIC BLOOD PRESSURE: 72 MMHG

## 2023-05-11 DIAGNOSIS — I48.91 ATRIAL FIBRILLATION WITH RAPID VENTRICULAR RESPONSE (H): ICD-10-CM

## 2023-05-11 PROCEDURE — 99214 OFFICE O/P EST MOD 30 MIN: CPT | Performed by: PHYSICIAN ASSISTANT

## 2023-05-11 RX ORDER — METOPROLOL SUCCINATE 50 MG/1
50 TABLET, EXTENDED RELEASE ORAL EVERY EVENING
Qty: 90 TABLET | Refills: 3 | Status: SHIPPED | OUTPATIENT
Start: 2023-05-11 | End: 2024-05-07

## 2023-05-11 NOTE — PROGRESS NOTES
"  Cardiology Clinic Progress Note    Service Date: May 11, 2023    Primary Cardiologist: Dr. Nain Seo      Reason for Visit: afib f/u     HPI:   Gabe Moon is a delightful 63-year-old gentleman who is otherwise healthy when he was admitted 5/22 with palpitations and weakness.  He was found to be in A-fib with RVR and cardioversion was attempted x2 but he did not convert.  He was started on medications and converted spontaneously.  His echocardiogram has been normal and he underwent a stress echocardiogram that showed just a brief run of SVT at peak exercise as well as occasional PVCs and he went 10 minutes and 22 seconds for total of 13 minutes.  We had discussed starting flecainide versus continuing metoprolol at that point he elected to continue metoprolol.    He comes in today for routine follow-up he continues to feel well.  He has had episodes where he has had just a palpitation here and there that last less than a minute and resolves.  This feels like thumping in his chest.  He otherwise denies syncope, presyncope, chest pain, orthopnea, or PND.  He does note that his cholesterol is higher this year than it has been previously in spite of eating similarly or perhaps better and he is wondering if the metoprolol could be contributing.    Social History:  Very active at baseline.  He has decreased his alcohol significantly and decrease that his exercise intensity significantly.  He works out basically daily.  He has a brother who also has A-fib.  Does not smoke.    Physical Exam:  /72   Pulse 84   Ht 1.892 m (6' 2.5\")   Wt 86.8 kg (191 lb 6.4 oz)   SpO2 97%   BMI 24.25 kg/m     Wt Readings from Last 5 Encounters:   05/11/23 86.8 kg (191 lb 6.4 oz)   03/21/23 86.6 kg (191 lb)   11/03/22 88.8 kg (195 lb 12.8 oz)   07/18/22 88 kg (194 lb)   06/26/22 88.2 kg (194 lb 8 oz)    Well-developed well-nourished gentleman in no acute distress.  Normocephalic atraumatic.  Heart is regular rate rhythm " without murmur rub or gallop.  Lungs are clear without wheezes rales or rhonchi.  Extremities without peripheral edema skin is warm and dry.    Assessment and Plan:  1.  Paroxysmal atrial fibrillation clinically in sinus rhythm today with one documented episode 5/22.  At this point he is continuing on metoprolol and he is maintaining sinus rhythm.  He feels well and has modified his risk factors.  Given he is doing so well we are not pursuing antiarrhythmic.  We discussed if A-fib should recur and he is not particularly symptomatic he certainly can call clinic and we can get him in and consider cardioversion versus other options.      2.  Dyslipidemia with elevated LDL this year 118 in spite of similar exercise patterns and improve dietary patterns.  There is a tiny amount of data suggesting that metoprolol may increase LDL slightly in a very small percentage of people.  We agreed that if lipids are still high next year we could consider switch to diltiazem if need be.    This patient is overall doing well.  He will follow-up with Dr. Nain Seo in 1 year, sooner with concerns.    Merry Yen PA-C  9:03 AM 5/11/2023   [facility] Cardiology     Orders this visit:  Orders Placed This Encounter   Procedures     Follow-Up with Cardiology     Orders Placed This Encounter   Medications     metoprolol succinate ER (TOPROL XL) 50 MG 24 hr tablet     Sig: Take 1 tablet (50 mg) by mouth every evening     Dispense:  90 tablet     Refill:  3     Medications Discontinued During This Encounter   Medication Reason     metoprolol succinate ER (TOPROL XL) 50 MG 24 hr tablet Reorder (No AVS / No eCancel)     Encounter Diagnosis   Name Primary?     Atrial fibrillation with rapid ventricular response (H)        Current Medications:  Current Outpatient Medications   Medication Sig Dispense Refill     Aspirin 81 MG CAPS        metoprolol succinate ER (TOPROL XL) 50 MG 24 hr tablet Take 1 tablet (50 mg) by mouth every evening 90  tablet 3     multivitamin, therapeutic with minerals (MULTI-VITAMIN) TABS tablet Take 1 tablet by mouth daily 100 tablet 0       Allergies:  No Known Allergies    Past Medical, Surgical and Family History:  Past Medical History:   Diagnosis Date     Atrial fibrillation with rapid ventricular response (H) 2022     Blood in semen      Inguinal hernia of right side with gangrene      Low back pain      Prostate infection      STD (sexually transmitted disease)      Varicose vein of leg      Past Surgical History:   Procedure Laterality Date     ABDOMEN SURGERY  may 1991    testicular varicose vein repair     COLONOSCOPY  May 2013    MRI Virtual Colonoscopy     COLONOSCOPY N/A 2021    Procedure: COLONOSCOPY;  Surgeon: Gage Mckee MD;  Location:  GI     HERNIA REPAIR      inguinal     LIGATE VEIN      '93 scrotum, '14 left leg     SOFT TISSUE SURGERY  2002    Lumbar spine issues, facet joint sundrome     Family History   Problem Relation Age of Onset     Breast Cancer Mother      Alzheimer Disease Mother      Depression Mother      Mental Illness Mother         Bi-Polar, Dementia, passed away age 84     Arrhythmia Mother         fast HR, continued on meds, no blood thinners     Coronary Artery Disease Mother      Atrial fibrillation Mother      Diabetes Father         Type II     Hypertension Father         managed with Medication     Prostate Cancer Father         diagnosis at age 77, now 88     Osteoarthritis Father         hip replacement (overwt)     Coronary Artery Disease Father         Congestive Heart Failure, diagnonsed      Obesity Father      Osteoarthritis Paternal Grandfather         hip replacements     Prostate Cancer Brother 55     Arrhythmia Brother 60        ER visit, meds x 10 days; reported as afib     Atrial fibrillation Brother      Prostate Cancer Brother         diagnosis 2021, treatment pending     Colon Cancer Maternal Cousin 53         of colon cancer at  58        Review of Systems:  Skin:  not assessed     Eyes:  not assessed    ENT:  not assessed    Respiratory:  Negative    Cardiovascular:  Negative    Gastroenterology: not assessed    Genitourinary:  not assessed    Musculoskeletal:  not assessed    Neurologic:  not assessed    Psychiatric:  not assessed    Heme/Lymph/Imm:  not assessed    Endocrine:  not assessed       Recent Lab Results:  LIPID RESULTS:  Lab Results   Component Value Date    CHOL 215 (H) 03/21/2023    CHOL 199 02/19/2021    HDL 85 03/21/2023    HDL 93 02/19/2021     (H) 03/21/2023    LDL 96 02/19/2021    TRIG 60 03/21/2023    TRIG 49 02/19/2021     LIVER ENZYME RESULTS:  Lab Results   Component Value Date    AST 26 03/21/2023    ALT 23 03/21/2023     CBC RESULTS:  Lab Results   Component Value Date    WBC 5.7 06/26/2022    RBC 4.64 06/26/2022    HGB 15.1 06/26/2022    HCT 43.7 06/26/2022    MCV 94 06/26/2022    MCH 32.5 06/26/2022    MCHC 34.6 06/26/2022    RDW 12.4 06/26/2022     (L) 06/26/2022     BMP RESULTS:  Lab Results   Component Value Date     03/21/2023     02/19/2021    POTASSIUM 4.6 03/21/2023    POTASSIUM 3.5 06/26/2022    POTASSIUM 4.0 02/19/2021    CHLORIDE 104 03/21/2023    CHLORIDE 107 06/26/2022    CHLORIDE 105 02/19/2021    CO2 27 03/21/2023    CO2 30 06/26/2022    CO2 27 02/19/2021    ANIONGAP 8 03/21/2023    ANIONGAP 4 06/26/2022    ANIONGAP 7 02/19/2021    GLC 94 03/21/2023     (H) 06/26/2022    GLC 85 02/19/2021    BUN 18.9 03/21/2023    BUN 16 06/26/2022    BUN 23 02/19/2021    CR 0.88 03/21/2023    CR 0.82 02/19/2021    GFRESTIMATED >90 03/21/2023    GFRESTIMATED >90 02/19/2021    GFRESTBLACK >90 02/19/2021    TIMOTEO 9.5 03/21/2023    TIMOTEO 9.7 02/19/2021      A1C RESULTS:  Lab Results   Component Value Date    A1C 5.4 03/21/2023     INR RESULTS:  No results found for: INR    CC  Merry Yen PA-C  [id number]  [place] [id number]

## 2023-05-11 NOTE — PATIENT INSTRUCTIONS
Thank you for visiting with me today.    We discussed: I'm glad you're doing well.      Medication changes:  continue current medications.      Follow up: with Dr. Nain Seo in 1 year.        Please call my nurse, Laila, at [phone] with any questions or concerns.    Scheduling phone number: [phone]  Reminder: Please bring in all current medications, over the counter supplements and vitamin bottles to your next appointment.

## 2023-05-11 NOTE — LETTER
"5/11/2023    Beatriz Edmondson MD  [address]  [facility] [address]    RE: Gabe Moon       Dear Colleague,     I had the pleasure of seeing Gabe Moon in the [facility].    Cardiology Clinic Progress Note    Service Date: May 11, 2023    Primary Cardiologist: Dr. Nain Seo      Reason for Visit: afib f/u     HPI:   Gabe Moon is a delightful 63-year-old gentleman who is otherwise healthy when he was admitted 5/22 with palpitations and weakness.  He was found to be in A-fib with RVR and cardioversion was attempted x2 but he did not convert.  He was started on medications and converted spontaneously.  His echocardiogram has been normal and he underwent a stress echocardiogram that showed just a brief run of SVT at peak exercise as well as occasional PVCs and he went 10 minutes and 22 seconds for total of 13 minutes.  We had discussed starting flecainide versus continuing metoprolol at that point he elected to continue metoprolol.    He comes in today for routine follow-up he continues to feel well.  He has had episodes where he has had just a palpitation here and there that last less than a minute and resolves.  This feels like thumping in his chest.  He otherwise denies syncope, presyncope, chest pain, orthopnea, or PND.  He does note that his cholesterol is higher this year than it has been previously in spite of eating similarly or perhaps better and he is wondering if the metoprolol could be contributing.    Social History:  Very active at baseline.  He has decreased his alcohol significantly and decrease that his exercise intensity significantly.  He works out basically daily.  He has a brother who also has A-fib.  Does not smoke.    Physical Exam:  /72   Pulse 84   Ht 1.892 m (6' 2.5\")   Wt 86.8 kg (191 lb 6.4 oz)   SpO2 97%   BMI 24.25 kg/m     Wt Readings from Last 5 Encounters:   05/11/23 86.8 kg (191 lb 6.4 oz)   03/21/23 86.6 kg " (191 lb)   11/03/22 88.8 kg (195 lb 12.8 oz)   07/18/22 88 kg (194 lb)   06/26/22 88.2 kg (194 lb 8 oz)    Well-developed well-nourished gentleman in no acute distress.  Normocephalic atraumatic.  Heart is regular rate rhythm without murmur rub or gallop.  Lungs are clear without wheezes rales or rhonchi.  Extremities without peripheral edema skin is warm and dry.    Assessment and Plan:  1.  Paroxysmal atrial fibrillation clinically in sinus rhythm today with one documented episode 5/22.  At this point he is continuing on metoprolol and he is maintaining sinus rhythm.  He feels well and has modified his risk factors.  Given he is doing so well we are not pursuing antiarrhythmic.  We discussed if A-fib should recur and he is not particularly symptomatic he certainly can call clinic and we can get him in and consider cardioversion versus other options.      2.  Dyslipidemia with elevated LDL this year 118 in spite of similar exercise patterns and improve dietary patterns.  There is a tiny amount of data suggesting that metoprolol may increase LDL slightly in a very small percentage of people.  We agreed that if lipids are still high next year we could consider switch to diltiazem if need be.    This patient is overall doing well.  He will follow-up with Dr. Nain Seo in 1 year, sooner with concerns.    Merry Yen PA-C  9:03 AM 5/11/2023   [facility]     Orders this visit:  Orders Placed This Encounter   Procedures    Follow-Up with Cardiology     Orders Placed This Encounter   Medications    metoprolol succinate ER (TOPROL XL) 50 MG 24 hr tablet     Sig: Take 1 tablet (50 mg) by mouth every evening     Dispense:  90 tablet     Refill:  3     Medications Discontinued During This Encounter   Medication Reason    metoprolol succinate ER (TOPROL XL) 50 MG 24 hr tablet Reorder (No AVS / No eCancel)     Encounter Diagnosis   Name Primary?    Atrial fibrillation with rapid ventricular response (H)         Current Medications:  Current Outpatient Medications   Medication Sig Dispense Refill    Aspirin 81 MG CAPS       metoprolol succinate ER (TOPROL XL) 50 MG 24 hr tablet Take 1 tablet (50 mg) by mouth every evening 90 tablet 3    multivitamin, therapeutic with minerals (MULTI-VITAMIN) TABS tablet Take 1 tablet by mouth daily 100 tablet 0       Allergies:  No Known Allergies    Past Medical, Surgical and Family History:  Past Medical History:   Diagnosis Date    Atrial fibrillation with rapid ventricular response (H) 6/25/2022    Blood in semen     Inguinal hernia of right side with gangrene 2003    Low back pain     Prostate infection     STD (sexually transmitted disease)     Varicose vein of leg      Past Surgical History:   Procedure Laterality Date    ABDOMEN SURGERY  may 1991    testicular varicose vein repair    COLONOSCOPY  May 2013    MRI Virtual Colonoscopy    COLONOSCOPY N/A 4/1/2021    Procedure: COLONOSCOPY;  Surgeon: Gage Mckee MD;  Location:  GI    HERNIA REPAIR  2003    inguinal    LIGATE VEIN      '93 scrotum, '14 left leg    SOFT TISSUE SURGERY  Jan 2002    Lumbar spine issues, facet joint sundrome     Family History   Problem Relation Age of Onset    Breast Cancer Mother     Alzheimer Disease Mother     Depression Mother     Mental Illness Mother         Bi-Polar, Dementia, passed away age 84    Arrhythmia Mother         fast HR, continued on meds, no blood thinners    Coronary Artery Disease Mother     Atrial fibrillation Mother     Diabetes Father         Type II    Hypertension Father         managed with Medication    Prostate Cancer Father         diagnosis at age 77, now 88    Osteoarthritis Father         hip replacement (overwt)    Coronary Artery Disease Father         Congestive Heart Failure, diagnonsed 2019    Obesity Father     Osteoarthritis Paternal Grandfather         hip replacements    Prostate Cancer Brother 55    Arrhythmia Brother 60        ER visit, meds x 10 days;  reported as afib    Atrial fibrillation Brother     Prostate Cancer Brother         diagnosis 2021, treatment pending    Colon Cancer Maternal Cousin 53         of colon cancer at 58        Review of Systems:  Skin:  not assessed     Eyes:  not assessed    ENT:  not assessed    Respiratory:  Negative    Cardiovascular:  Negative    Gastroenterology: not assessed    Genitourinary:  not assessed    Musculoskeletal:  not assessed    Neurologic:  not assessed    Psychiatric:  not assessed    Heme/Lymph/Imm:  not assessed    Endocrine:  not assessed       Recent Lab Results:  LIPID RESULTS:  Lab Results   Component Value Date    CHOL 215 (H) 2023    CHOL 199 2021    HDL 85 2023    HDL 93 2021     (H) 2023    LDL 96 2021    TRIG 60 2023    TRIG 49 2021     LIVER ENZYME RESULTS:  Lab Results   Component Value Date    AST 26 2023    ALT 23 2023     CBC RESULTS:  Lab Results   Component Value Date    WBC 5.7 2022    RBC 4.64 2022    HGB 15.1 2022    HCT 43.7 2022    MCV 94 2022    MCH 32.5 2022    MCHC 34.6 2022    RDW 12.4 2022     (L) 2022     BMP RESULTS:  Lab Results   Component Value Date     2023     2021    POTASSIUM 4.6 2023    POTASSIUM 3.5 2022    POTASSIUM 4.0 2021    CHLORIDE 104 2023    CHLORIDE 107 2022    CHLORIDE 105 2021    CO2 27 2023    CO2 30 2022    CO2 27 2021    ANIONGAP 8 2023    ANIONGAP 4 2022    ANIONGAP 7 2021    GLC 94 2023     (H) 2022    GLC 85 2021    BUN 18.9 2023    BUN 16 2022    BUN 23 2021    CR 0.88 2023    CR 0.82 2021    GFRESTIMATED >90 2023    GFRESTIMATED >90 2021    GFRESTBLACK >90 2021    TIMOTEO 9.5 2023    TIMOTEO 9.7 2021      A1C RESULTS:  Lab Results   Component Value Date     A1C 5.4 03/21/2023     INR RESULTS:  No results found for: INR    CC  Merry Yen PA-C  [address]  [place] [id number]        Thank you for allowing me to participate in the care of your patient.      Sincerely,     Merry Yen PA-C     [facility]

## 2023-09-20 ENCOUNTER — OFFICE VISIT (OUTPATIENT)
Dept: FAMILY MEDICINE | Facility: CLINIC | Age: 63
End: 2023-09-20
Payer: COMMERCIAL

## 2023-09-20 DIAGNOSIS — Z12.83 ENCOUNTER FOR SCREENING FOR MALIGNANT NEOPLASM OF SKIN: ICD-10-CM

## 2023-09-20 DIAGNOSIS — L81.4 LENTIGINES: ICD-10-CM

## 2023-09-20 DIAGNOSIS — L82.1 SEBORRHEIC KERATOSES: ICD-10-CM

## 2023-09-20 DIAGNOSIS — D22.9 MULTIPLE BENIGN NEVI: Primary | ICD-10-CM

## 2023-09-20 DIAGNOSIS — L82.0 INFLAMED SEBORRHEIC KERATOSIS: ICD-10-CM

## 2023-09-20 DIAGNOSIS — D18.01 CHERRY ANGIOMA: ICD-10-CM

## 2023-09-20 PROCEDURE — 99213 OFFICE O/P EST LOW 20 MIN: CPT | Mod: 25 | Performed by: NURSE PRACTITIONER

## 2023-09-20 PROCEDURE — 17110 DESTRUCTION B9 LES UP TO 14: CPT | Performed by: NURSE PRACTITIONER

## 2023-09-20 ASSESSMENT — PAIN SCALES - GENERAL: PAINLEVEL: NO PAIN (0)

## 2023-09-20 NOTE — PATIENT INSTRUCTIONS
Patient Education       Proper skin care from [facility]:    -Eliminate harsh soaps as they strip the natural oils from the skin, often resulting in dry itchy skin ( i.e. Dial, Zest, [place] Spring)  -Use mild soaps such as Cetaphil or Dove Sensitive Skin in the shower. You do not need to use soap on arms, legs, and trunk every time you shower unless visibly soiled.   -Avoid hot or cold showers.  -After showering, lightly dry off and apply moisturizing within 2-3 minutes. This will help trap moisture in the skin.   -Aggressive use of a moisturizer at least 1-2 times a day to the entire body (including -Vanicream, Cetaphil, Aquaphor or Cerave) and moisturize hands after every washing.  -We recommend using moisturizers that come in a tub that needs to be scooped out, not a pump. This has more of an oil base. It will hold moisture in your skin much better than a water base moisturizer. The above recommended are non-pore clogging.      Wear a sunscreen with at least SPF 30 on your face, ears, neck and V of the chest daily. Wear sunscreen on other areas of the body if those areas are exposed to the sun throughout the day. Sunscreens can contain physical and/or chemical blockers. Physical blockers are less likely to clog pores, these include zinc oxide and titanium dioxide. Reapply every two hour and after swimming.     Sunscreen examples: https://www.ewg.org/sunscreen/    UV radiation  UVA radiation remains constant throughout the day and throughout the year. It is a longer wavelength than UVB and therefore penetrates deeper into the skin leading to immediate and delayed tanning, photoaging, and skin cancer. 70-80% of UVA and UVB radiation occurs between the hours of 10am-2pm.  UVB radiation  UVB radiation causes the most harmful effects and is more significant during the summer months. However, snow and ice can reflect UVB radiation leading to skin damage during the winter months as well. UVB radiation is  responsible for tanning, burning, inflammation, delayed erythema (pinkness), pigmentation (brown spots), and skin cancer.     I recommend self monthly full body exams and yearly full body exams with a dermatology provider. If you develop a new or changing lesion please follow up for examination. Most skin cancers are pink and scaly or pink and pearly. However, we do see blue/brown/black skin cancers.  Consider the ABCDEs of melanoma when giving yourself your monthly full body exam ( don't forget the groin, buttocks, feet, toes, etc). A-asymmetry, B-borders, C-color, D-diameter, E-elevation or evolving. If you see any of these changes please follow up in clinic. If you cannot see your back I recommend purchasing a hand held mirror to use with a larger wall mirror.       Checking for Skin Cancer  You can find cancer early by checking your skin each month. There are 3 kinds of skin cancer. They are melanoma, basal cell carcinoma, and squamous cell carcinoma. Doing monthly skin checks is the best way to find new marks or skin changes. Follow the instructions below for checking your skin.   The ABCDEs of checking moles for melanoma   Check your moles or growths for signs of melanoma using ABCDE:   Asymmetry: the sides of the mole or growth don t match  Border: the edges are ragged, notched, or blurred  Color: the color within the mole or growth varies  Diameter: the mole or growth is larger than 6 mm (size of a pencil eraser)  Evolving: the size, shape, or color of the mole or growth is changing (evolving is not shown in the images below)    Checking for other types of skin cancer  Basal cell carcinoma or squamous cell carcinoma have symptoms such as:     A spot or mole that looks different from all other marks on your skin  Changes in how an area feels, such as itching, tenderness, or pain  Changes in the skin's surface, such as oozing, bleeding, or scaliness  A sore that does not heal  New swelling or redness beyond  the border of a mole    Who s at risk?  Anyone can get skin cancer. But you are at greater risk if you have:   Fair skin, light-colored hair, or light-colored eyes  Many moles or abnormal moles on your skin  A history of sunburns from sunlight or tanning beds  A family history of skin cancer  A history of exposure to radiation or chemicals  A weakened immune system  If you have had skin cancer in the past, you are at risk for recurring skin cancer.   How to check your skin  Do your monthly skin checkups in front of a full-length mirror. Check all parts of your body, including your:   Head (ears, face, neck, and scalp)  Torso (front, back, and sides)  Arms (tops, undersides, upper, and lower armpits)  Hands (palms, backs, and fingers, including under the nails)  Buttocks and genitals  Legs (front, back, and sides)  Feet (tops, soles, toes, including under the nails, and between toes)  If you have a lot of moles, take digital photos of them each month. Make sure to take photos both up close and from a distance. These can help you see if any moles change over time.   Most skin changes are not cancer. But if you see any changes in your skin, call your doctor right away. Only he or she can diagnose a problem. If you have skin cancer, seeing your doctor can be the first step toward getting the treatment that could save your life.   [employer] last reviewed this educational content on 4/1/2019 2000-2020 [employer]. [address] [place] [address], [place] [address]. All rights reserved. This information is not intended as a substitute for professional medical care. Always follow your healthcare professional's instructions.       When should I call my doctor?  If you are worsening or not improving, please, contact us or seek urgent care as noted below.     Who should I call with questions (adults)?  [facility] (adult and pediatric): [phone]  [facility]  [place] (adult): [phone]  [facility] ([place], [place], [place] and [place]) [phone]  For urgent needs outside of business hours call the [facility] at [phone] and ask for the dermatology resident on call to be paged  If this is a medical emergency and you are unable to reach an ER, Call 911      If you need a prescription refill, please contact your pharmacy. Refills are approved or denied by our Physicians during normal business hours, Monday through Fridays  Per office policy, refills will not be granted if you have not been seen within the past year (or sooner depending on your child's condition)

## 2023-09-20 NOTE — LETTER
"    9/20/2023         RE: Gabe Moon  [address] [address]        Dear Colleague,    Thank you for referring your patient, Gabe Moon, to the [facility]. Please see a copy of my visit note below.    [facility] Dermatology Note  Encounter Date: Sep 20, 2023  Office Visit     Reviewed patients past medical history and pertinent chart review prior to patients visit today.     Dermatology Problem List:  ISK    Patient denies personal history of skin cancer or dysplastic nevi.    Brother recently had \"precancer\" lesions excised, unknown diagnoses put patient says not melanoma    ____________________________________________    Assessment & Plan:     # Irritated and inflamed Seborrheic Keratosis. Discussed treatment options with patient including no treatment, cryotherapy, and shave removal. Patient prefers cryotherapy today due to irritation and itching. After verbal consent and discussion of risks and benefits including but no limited to dyspigmentation/scar, blister, and pain, 14 was(were) treated with 1-2mm freeze border for 2 cycles with liquid nitrogen. Post cryotherapy instructions were provided.       # Benign skin findings including: seborrheic keratoses, cherry angioma, lentigines and benign nevi.   - No further intervention required. Patient to report changes.   - Patient reassured of the benign nature of these lesions.    #Signs and Symptoms of non-melanoma skin cancer and ABCDEs of melanoma reviewed with patient. Patient encouraged to perform monthly self skin exams and educated on how to perform them. UV precautions reviewed with patient. Patient was asked about new or changing moles/lesions on body.     #Reviewed Sunscreen: Apply 20 minutes prior to going outdoors and reapply every two hours, when wet or sweating. We recommend using an SPF 30 or higher, and to use one that is water resistant.       Follow-up:  1-2 years " for follow up full body skin exam, prn for new or changing lesions or new concerns    Rand Krishnamurthy CNP  Dermatology     ____________________________________________    CC: Skin Check ([facility])    HPI:  Mr. Gabe Moon is a(n) 63 year old male who presents today as a return patient for a full body skin cancer screening. Patient has concerns today about irritated and itchy spots on his scalp, legs, right abdomen and back.     Patient is otherwise feeling well, without additional skin concerns.     Physical Exam:  Vitals: There were no vitals taken for this visit.  SKIN: Total skin excluding the genitalia areas was performed. The exam included the head/face, neck, both arms, chest, back, abdomen, both legs, digits, mons pubis, buttock and nails.   -several 1-2mm red dome shaped symmetric papules scattered on the trunk  -multiple tan/brown flat round macules and raised papules scattered throughout trunk, extremities and head. No worrisome features for malignancy noted on examination.  -scattered tan, homogenous macules scattered on sun exposed areas of trunk, extremities and face.   -scattered waxy, stuck on tan/brown papules and patches on the trunk, extremities, face and head    - No other lesions of concern on areas examined.     Medications:  Current Outpatient Medications   Medication     Aspirin 81 MG CAPS     metoprolol succinate ER (TOPROL XL) 50 MG 24 hr tablet     multivitamin, therapeutic with minerals (MULTI-VITAMIN) TABS tablet     No current facility-administered medications for this visit.      Past Medical History:   Patient Active Problem List   Diagnosis     Family history of prostate cancer     Atrial fibrillation with rapid ventricular response (H)     Past Medical History:   Diagnosis Date     Atrial fibrillation with rapid ventricular response (H) 6/25/2022     Blood in semen      Inguinal hernia of right side with gangrene 2003     Low back pain      Prostate infection      STD (sexually  transmitted disease)      Varicose vein of leg        CC Referred Self, MD  No address on file on close of this encounter.      Again, thank you for allowing me to participate in the care of your patient.        Sincerely,        MARY Núñez CNP

## 2023-09-20 NOTE — PROGRESS NOTES
"[facility] Dermatology Note  Encounter Date: Sep 20, 2023  Office Visit     Reviewed patients past medical history and pertinent chart review prior to patients visit today.     Dermatology Problem List:  ISK    Patient denies personal history of skin cancer or dysplastic nevi.    Brother recently had \"precancer\" lesions excised, unknown diagnoses put patient says not melanoma    ____________________________________________    Assessment & Plan:     # Irritated and inflamed Seborrheic Keratosis. Discussed treatment options with patient including no treatment, cryotherapy, and shave removal. Patient prefers cryotherapy today due to irritation and itching. After verbal consent and discussion of risks and benefits including but no limited to dyspigmentation/scar, blister, and pain, 14 was(were) treated with 1-2mm freeze border for 2 cycles with liquid nitrogen. Post cryotherapy instructions were provided.       # Benign skin findings including: seborrheic keratoses, cherry angioma, lentigines and benign nevi.   - No further intervention required. Patient to report changes.   - Patient reassured of the benign nature of these lesions.    #Signs and Symptoms of non-melanoma skin cancer and ABCDEs of melanoma reviewed with patient. Patient encouraged to perform monthly self skin exams and educated on how to perform them. UV precautions reviewed with patient. Patient was asked about new or changing moles/lesions on body.     #Reviewed Sunscreen: Apply 20 minutes prior to going outdoors and reapply every two hours, when wet or sweating. We recommend using an SPF 30 or higher, and to use one that is water resistant.       Follow-up:  1-2 years for follow up full body skin exam, prn for new or changing lesions or new concerns    Rand Krishnamurthy CNP  Dermatology     ____________________________________________    CC: Skin Check ([facility])    HPI:  Mr. Gabe Moon is a(n) 63 year old male who presents today as a " return patient for a full body skin cancer screening. Patient has concerns today about irritated and itchy spots on his scalp, legs, right abdomen and back.     Patient is otherwise feeling well, without additional skin concerns.     Physical Exam:  Vitals: There were no vitals taken for this visit.  SKIN: Total skin excluding the genitalia areas was performed. The exam included the head/face, neck, both arms, chest, back, abdomen, both legs, digits, mons pubis, buttock and nails.   -several 1-2mm red dome shaped symmetric papules scattered on the trunk  -multiple tan/brown flat round macules and raised papules scattered throughout trunk, extremities and head. No worrisome features for malignancy noted on examination.  -scattered tan, homogenous macules scattered on sun exposed areas of trunk, extremities and face.   -scattered waxy, stuck on tan/brown papules and patches on the trunk, extremities, face and head    - No other lesions of concern on areas examined.     Medications:  Current Outpatient Medications   Medication    Aspirin 81 MG CAPS    metoprolol succinate ER (TOPROL XL) 50 MG 24 hr tablet    multivitamin, therapeutic with minerals (MULTI-VITAMIN) TABS tablet     No current facility-administered medications for this visit.      Past Medical History:   Patient Active Problem List   Diagnosis    Family history of prostate cancer    Atrial fibrillation with rapid ventricular response (H)     Past Medical History:   Diagnosis Date    Atrial fibrillation with rapid ventricular response (H) 6/25/2022    Blood in semen     Inguinal hernia of right side with gangrene 2003    Low back pain     Prostate infection     STD (sexually transmitted disease)     Varicose vein of leg        CC Referred Self, MD  No address on file on close of this encounter.

## 2023-10-12 ENCOUNTER — MYC MEDICAL ADVICE (OUTPATIENT)
Dept: FAMILY MEDICINE | Facility: CLINIC | Age: 63
End: 2023-10-12
Payer: COMMERCIAL

## 2023-10-12 DIAGNOSIS — U07.1 INFECTION DUE TO 2019 NOVEL CORONAVIRUS: Primary | ICD-10-CM

## 2023-10-13 NOTE — TELEPHONE ENCOUNTER
RN COVID TREATMENT VISIT  10/13/23      The patient has been triaged and does not require a higher level of care.    Gabe Moon  63 year old  Current weight? 190 lbs    Has the patient been seen by a primary care provider at an [facility] or [facility] within the past two years? Yes.   Have you been in close proximity to/do you have a known exposure to a person with a confirmed case of influenza? No.     General treatment eligibility:  Date of positive COVID test (PCR or at home)?  10/12/2023    Are you or have you been hospitalized for this COVID-19 infection? No.   Have you received monoclonal antibodies or antiviral treatment for COVID-19 since this positive test? No.   Do you have any of the following conditions that place you at risk of being very sick from COVID-19?   - Age 50 years or older  Yes, patient has at least one high risk condition as noted above.     Current COVID symptoms:   - fever or chills  - cough  - fatigue  - muscle or body aches  - headache  - sore throat  - congestion or runny nose  Yes. Patient has at least one symptom as selected.     How many days since symptoms started? 5 days or less. Established patient, 12 years or older weighing at least 88.2 lbs, who has symptoms that started in the past 5 days, has not been hospitalized nor received treatment already, and is at risk for being very sick from COVID-19.     Treatment eligibility by RN:  Are you currently pregnant or nursing? No  Do you have a clinically significant hypersensitivity to nirmatrelvir or ritonavir, or toxic epidermal necrolysis (TEN) or Samayoa-Franki Syndrome? No  Do you have a history of hepatitis, any hepatic impairment on the Problem List (such as Child-Moody Class C, cirrhosis, fatty liver disease, alcoholic liver disease), or was the last liver lab (hepatic panel, ALT, AST, ALK Phos, bilirubin) elevated in the past 6 months? No  Do you have any history of severe renal impairment  (eGFR < 30mL/min)? No    Is patient eligible to continue? Yes, patient meets all eligibility requirements for the RN COVID treatment (as denoted by all no responses above).     Current Outpatient Medications   Medication Sig Dispense Refill    Aspirin 81 MG CAPS       metoprolol succinate ER (TOPROL XL) 50 MG 24 hr tablet Take 1 tablet (50 mg) by mouth every evening 90 tablet 3    multivitamin, therapeutic with minerals (MULTI-VITAMIN) TABS tablet Take 1 tablet by mouth daily 100 tablet 0       Medications from List 1 of the standing order (on medications that exclude the use of Paxlovid) that patient is taking: NONE. Is patient taking [place] Wort? No  Is patient taking [place] Wort or any meds from List 1? No.   Medications from List 2 of the standing order (on meds that provider needs to adjust) that patient is taking: NONE. Is patient on any of the meds from List 2? No.   Medications from List 3 of standing order (on meds that a RN needs to adjust) that patient is taking: NONE. Is patient on any meds from List 3? No.     Paxlovid has an approximate 90% reduction in hospitalization. Paxlovid can possibly cause altered sense of taste, diarrhea (loose, watery stools), high blood pressure, muscle aches.     Would patient like a Paxlovid prescription?   Yes.   Lab Results   Component Value Date    GFRESTIMATED >90 03/21/2023       Was last eGFR reduced? No, eGFR 60 or greater/ No Result on record. Patient can receive the normal renal function dose. Paxlovid Rx sent to [facility] and [facility]      Temporary change to home medications: None    All medication adjustments (holds, etc) were discussed with the patient and patient was asked to repeat back (teachback) their med adjustment.  Did patient understand med adjustment? No medication adjustments needed.         Reviewed the following instructions with the patient:    Paxlovid (nimatrelvir and ritonavir)    How it  works  Two medicines (nirmatrelvir and ritonavir) are taken together. They stop the virus from growing. Less amount of virus is easier for your body to fight.    How to take  Medicine comes in a daily container with both medicine tablets. Take by mouth twice daily (once in the morning, once at night) for 5 days.  The number of tablets to take varies by patient.  Don't chew or break capsules. Swallow whole.    When to take  Take as soon as possible after positive COVID-19 test result, and within 5 days of your first symptoms.    Possible side effects  Can cause altered sense of taste, diarrhea (loose, watery stools), high blood pressure, muscle aches.    Alka Blake RN

## 2023-10-13 NOTE — TELEPHONE ENCOUNTER
Triage,   Patient called.   States the [facility] in [place] is out of stock.   Needs new pharmacy.   New pharmacy T'd up.   Thanks!  [place]

## 2023-12-04 ENCOUNTER — OFFICE VISIT (OUTPATIENT)
Dept: FAMILY MEDICINE | Facility: CLINIC | Age: 63
End: 2023-12-04
Payer: COMMERCIAL

## 2023-12-04 DIAGNOSIS — L82.0 SEBORRHEIC KERATOSES, INFLAMED: Primary | ICD-10-CM

## 2023-12-04 PROBLEM — L82.1 SEBORRHEIC KERATOSES: Status: ACTIVE | Noted: 2023-12-04

## 2023-12-04 PROCEDURE — 17110 DESTRUCTION B9 LES UP TO 14: CPT | Performed by: PHYSICIAN ASSISTANT

## 2023-12-04 ASSESSMENT — PAIN SCALES - GENERAL: PAINLEVEL: NO PAIN (0)

## 2023-12-04 NOTE — PATIENT INSTRUCTIONS
Patient Education       Proper skin care from [facility]:    -Eliminate harsh soaps as they strip the natural oils from the skin, often resulting in dry itchy skin ( i.e. Dial, Zest, [place])  -Use mild soaps such as Cetaphil or Dove Sensitive Skin in the shower. You do not need to use soap on arms, legs, and trunk every time you shower unless visibly soiled.   -Avoid hot or cold showers.  -After showering, lightly dry off and apply moisturizing within 2-3 minutes. This will help trap moisture in the skin.   -Aggressive use of a moisturizer at least 1-2 times a day to the entire body (including -Vanicream, Cetaphil, Aquaphor or Cerave) and moisturize hands after every washing.  -We recommend using moisturizers that come in a tub that needs to be scooped out, not a pump. This has more of an oil base. It will hold moisture in your skin much better than a water base moisturizer. The above recommended are non-pore clogging.      Wear a sunscreen with at least SPF 30 on your face, ears, neck and V of the chest daily. Wear sunscreen on other areas of the body if those areas are exposed to the sun throughout the day. Sunscreens can contain physical and/or chemical blockers. Physical blockers are less likely to clog pores, these include zinc oxide and titanium dioxide. Reapply every two hour and after swimming.     Sunscreen examples: https://www.ewg.org/sunscreen/    UV radiation  UVA radiation remains constant throughout the day and throughout the year. It is a longer wavelength than UVB and therefore penetrates deeper into the skin leading to immediate and delayed tanning, photoaging, and skin cancer. 70-80% of UVA and UVB radiation occurs between the hours of 10am-2pm.  UVB radiation  UVB radiation causes the most harmful effects and is more significant during the summer months. However, snow and ice can reflect UVB radiation leading to skin damage during the winter months as well. UVB radiation is  responsible for tanning, burning, inflammation, delayed erythema (pinkness), pigmentation (brown spots), and skin cancer.     I recommend self monthly full body exams and yearly full body exams with a dermatology provider. If you develop a new or changing lesion please follow up for examination. Most skin cancers are pink and scaly or pink and pearly. However, we do see blue/brown/black skin cancers.  Consider the ABCDEs of melanoma when giving yourself your monthly full body exam ( don't forget the groin, buttocks, feet, toes, etc). A-asymmetry, B-borders, C-color, D-diameter, E-elevation or evolving. If you see any of these changes please follow up in clinic. If you cannot see your back I recommend purchasing a hand held mirror to use with a larger wall mirror.       Checking for Skin Cancer  You can find cancer early by checking your skin each month. There are 3 kinds of skin cancer. They are melanoma, basal cell carcinoma, and squamous cell carcinoma. Doing monthly skin checks is the best way to find new marks or skin changes. Follow the instructions below for checking your skin.   The ABCDEs of checking moles for melanoma   Check your moles or growths for signs of melanoma using ABCDE:   Asymmetry: the sides of the mole or growth don t match  Border: the edges are ragged, notched, or blurred  Color: the color within the mole or growth varies  Diameter: the mole or growth is larger than 6 mm (size of a pencil eraser)  Evolving: the size, shape, or color of the mole or growth is changing (evolving is not shown in the images below)    Checking for other types of skin cancer  Basal cell carcinoma or squamous cell carcinoma have symptoms such as:     A spot or mole that looks different from all other marks on your skin  Changes in how an area feels, such as itching, tenderness, or pain  Changes in the skin's surface, such as oozing, bleeding, or scaliness  A sore that does not heal  New swelling or redness beyond  the border of a mole    Who s at risk?  Anyone can get skin cancer. But you are at greater risk if you have:   Fair skin, light-colored hair, or light-colored eyes  Many moles or abnormal moles on your skin  A history of sunburns from sunlight or tanning beds  A family history of skin cancer  A history of exposure to radiation or chemicals  A weakened immune system  If you have had skin cancer in the past, you are at risk for recurring skin cancer.   How to check your skin  Do your monthly skin checkups in front of a full-length mirror. Check all parts of your body, including your:   Head (ears, face, neck, and scalp)  Torso (front, back, and sides)  Arms (tops, undersides, upper, and lower armpits)  Hands (palms, backs, and fingers, including under the nails)  Buttocks and genitals  Legs (front, back, and sides)  Feet (tops, soles, toes, including under the nails, and between toes)  If you have a lot of moles, take digital photos of them each month. Make sure to take photos both up close and from a distance. These can help you see if any moles change over time.   Most skin changes are not cancer. But if you see any changes in your skin, call your doctor right away. Only he or she can diagnose a problem. If you have skin cancer, seeing your doctor can be the first step toward getting the treatment that could save your life.   [employer] last reviewed this educational content on 4/1/2019 2000-2020 [employer]. [address] [place] [address], [place] [address]. All rights reserved. This information is not intended as a substitute for professional medical care. Always follow your healthcare professional's instructions.       When should I call my doctor?  If you are worsening or not improving, please, contact us or seek urgent care as noted below.     Who should I call with questions (adults)?  [facility] (adult and pediatric): [phone]  [facility]  [place] (adult): [phone]  [facility] ([place], [place], [place] and [place]) [phone]  For urgent needs outside of business hours call the [facility] at [phone] and ask for the dermatology resident on call to be paged  If this is a medical emergency and you are unable to reach an ER, Call 911      If you need a prescription refill, please contact your pharmacy. Refills are approved or denied by our Physicians during normal business hours, Monday through Fridays  Per office policy, refills will not be granted if you have not been seen within the past year (or sooner depending on your child's condition)       Cryotherapy    What is it?  Use of a very cold liquid, such as liquid nitrogen, to freeze and destroy abnormal skin cells that need to be removed    What should I expect?  Tenderness and redness  A small blister that might grow and fill with dark purple blood. There may be crusting.  More than one treatment may be needed if the lesions do not go away.    How do I care for the treated area?  Gently wash the area with your hands when bathing.  Use a thin layer of Vaseline to help with healing. You may use a Band-Aid.   The area should heal within 7-10 days and may leave behind a pink or lighter color.   Do not use an antibiotic or Neosporin ointment.   You may take acetaminophen (Tylenol) for pain.     Call your Doctor if you have:  Severe pain  Signs of infection (warmth, redness, cloudy yellow drainage, and or a bad smell)  Questions or concerns    Who should I call with questions?      [facility]: [phone]      [facility]: [phone]      For urgent needs outside of business hours call the [facility] at [phone]        and ask for the dermatology resident on call

## 2023-12-04 NOTE — PROGRESS NOTES
"[facility] Dermatology Note  Encounter Date: Dec 4, 2023  Office Visit      Dermatology Problem List:  Last FBSC performed on 9/20/23    ISK: Right Ear. S/p Cryo 12/4/23     FMHx: Brother recently had \"precancer\" lesions excised, unknown diagnoses put patient says not melanoma  ____________________________________________    Assessment & Plan:  # Seborrheic keratosis, inflamed. Behind Right ear.   - Cryotherapy performed today, see procedure note below.    Procedures Performed:   - Cryotherapy procedure note, location(s): See above. After verbal consent and discussion of risks and benefits including, but not limited to, dyspigmentation/scar, blister, and pain, 1 lesion(s) was(were) treated with 1-2 mm freeze border for 1-2 cycles with liquid nitrogen. Post cryotherapy instructions were provided.     Follow-up: prn for new or changing lesions    Staff and scribe     Scribe Disclosure:   I, SHILOH SO, am serving as a scribe; to document services personally performed by Jennifer Pablo PA-C -based on data collection and the provider's statements to me.     Provider Disclosure:  I agree with above History, Review of Systems, Physical exam and Plan.  I have reviewed the content of the documentation and have edited it as needed. I have personally performed the services documented here and the documentation accurately represents those services and the decisions I have made.      Electronically signed by:    All risks, benefits and alternatives were discussed with patient.  Patient is in agreement and understands the assessment and plan.  All questions were answered.    Jennifer Pablo PA-C, MPAS  [facility] [place]: Phone: [phone], Fax: [phone]  [facility]: Phone: [phone],  Fax: [phone]  [facility]: Phone: [phone], Fax: " [phone]  ____________________________________________    CC: Derm Problem (C/O spot behind right ear and on scalp)      Reviewed patients past medical history and pertinent chart review prior to patient's visit today.     HPI:  Mr. Gabe Moon is a 63 year old male who presents today as a return patient for spot check due to a lesion of concern. Was last seen by Kimani Krishnamurthy.    Today patient reported a spot of concern on his right ear and on his scalp.    Patient is otherwise feeling well, without additional concerns.    Labs:  N/A    Physical Exam:  Vitals: There were no vitals taken for this visit.  SKIN: Focused examination of scalp and right ear was performed.   - There is a waxy stuck on tan to brown papule behind his right ear.   - No other lesions of concern on areas examined.     Medications:  Current Outpatient Medications   Medication    Aspirin 81 MG CAPS    metoprolol succinate ER (TOPROL XL) 50 MG 24 hr tablet    multivitamin, therapeutic with minerals (MULTI-VITAMIN) TABS tablet    nirmatrelvir and ritonavir (PAXLOVID) 300 mg/100 mg therapy pack     No current facility-administered medications for this visit.      Past Medical/Surgical History:   Patient Active Problem List   Diagnosis    Family history of prostate cancer    Atrial fibrillation with rapid ventricular response (H)     Past Medical History:   Diagnosis Date    Atrial fibrillation with rapid ventricular response (H) 6/25/2022    Blood in semen     Inguinal hernia of right side with gangrene 2003    Low back pain     Prostate infection     STD (sexually transmitted disease)     Varicose vein of leg

## 2023-12-04 NOTE — LETTER
"    12/4/2023         RE: Gabe Moon  [address] [address]        Dear Colleague,    Thank you for referring your patient, Gabe Moon, to the [facility]. Please see a copy of my visit note below.    [facility] Dermatology Note  Encounter Date: Dec 4, 2023  Office Visit      Dermatology Problem List:  Last FBSC performed on 9/20/23    ISK: Right Ear. S/p Cryo 12/4/23     FMHx: Brother recently had \"precancer\" lesions excised, unknown diagnoses put patient says not melanoma  ____________________________________________    Assessment & Plan:  # Seborrheic keratosis, inflamed. Behind Right ear.   - Cryotherapy performed today, see procedure note below.    Procedures Performed:   - Cryotherapy procedure note, location(s): See above. After verbal consent and discussion of risks and benefits including, but not limited to, dyspigmentation/scar, blister, and pain, 1 lesion(s) was(were) treated with 1-2 mm freeze border for 1-2 cycles with liquid nitrogen. Post cryotherapy instructions were provided.     Follow-up: prn for new or changing lesions    Staff and scribe     Scribe Disclosure:   I, SHILOH SO, am serving as a scribe; to document services personally performed by Jennifer Pablo PA-C -based on data collection and the provider's statements to me.     Provider Disclosure:  I agree with above History, Review of Systems, Physical exam and Plan.  I have reviewed the content of the documentation and have edited it as needed. I have personally performed the services documented here and the documentation accurately represents those services and the decisions I have made.      Electronically signed by:    All risks, benefits and alternatives were discussed with patient.  Patient is in agreement and understands the assessment and plan.  All questions were answered.    Jennifer Pablo PA-C, MPAS  [facility] " [facility]: Phone: [phone], Fax: [phone]  [facility]: Phone: [phone],  Fax: [phone]  [facility]: Phone: [phone], Fax: [phone]  ____________________________________________    CC: Derm Problem (C/O spot behind right ear and on scalp)      Reviewed patients past medical history and pertinent chart review prior to patient's visit today.     HPI:  Mr. Gabe Moon is a 63 year old male who presents today as a return patient for spot check due to a lesion of concern. Was last seen by Kimani Krishnamurthy.    Today patient reported a spot of concern on his right ear and on his scalp.    Patient is otherwise feeling well, without additional concerns.    Labs:  N/A    Physical Exam:  Vitals: There were no vitals taken for this visit.  SKIN: Focused examination of scalp and right ear was performed.   - There is a waxy stuck on tan to brown papule behind his right ear.   - No other lesions of concern on areas examined.     Medications:  Current Outpatient Medications   Medication     Aspirin 81 MG CAPS     metoprolol succinate ER (TOPROL XL) 50 MG 24 hr tablet     multivitamin, therapeutic with minerals (MULTI-VITAMIN) TABS tablet     nirmatrelvir and ritonavir (PAXLOVID) 300 mg/100 mg therapy pack     No current facility-administered medications for this visit.      Past Medical/Surgical History:   Patient Active Problem List   Diagnosis     Family history of prostate cancer     Atrial fibrillation with rapid ventricular response (H)     Past Medical History:   Diagnosis Date     Atrial fibrillation with rapid ventricular response (H) 6/25/2022     Blood in semen      Inguinal hernia of right side with gangrene 2003     Low back pain      Prostate infection      STD (sexually transmitted disease)      Varicose vein of leg                         Again, thank you for allowing me to participate in the care of your  patient.        Sincerely,        Jennifer Pablo PA-C

## 2024-03-06 ENCOUNTER — PATIENT OUTREACH (OUTPATIENT)
Dept: CARE COORDINATION | Facility: CLINIC | Age: 64
End: 2024-03-06
Payer: COMMERCIAL

## 2024-03-25 ENCOUNTER — TELEPHONE (OUTPATIENT)
Dept: CARDIOLOGY | Facility: CLINIC | Age: 64
End: 2024-03-25
Payer: COMMERCIAL

## 2024-03-25 NOTE — TELEPHONE ENCOUNTER
Health Call Center    Phone Message    May a detailed message be left on voicemail: yes     Reason for Call: Other: Patient states he had afib from 12-3 am last night and is looking for some follow up on this. No current symptoms, just really work out.  Please call the patient back this morning if possible to discuss.      Action Taken: Other: cardiology    Travel Screening: Not Applicable  Thank you!  [facility]

## 2024-03-25 NOTE — TELEPHONE ENCOUNTER
Pt called to update Merry Yen PA-C that he had an episode of atrial fibrillation last night from around midnight to 0200. Pt has an apple watch and it alerted him to an elevated HR while he was reading. He used the ECG function on his watch and it reported atrial fibrillation, HR 130s. Pt did not have any shortness of breath, but felt like his heart was racing. He took an extra metoprolol succinate 50 mg during the episode. Pt said his HR went into the 80s around 0200, and he converted back into NSR. He does not take anticoagulation, but confirmed he takes a baby aspirin daily. This is his first episode since he was first diagnosed with afib. I will route an update to Merry Yen PA-C and call pt back with any other recommendations she has at this time. Pt has a visit scheduled with  5/7/24. ESletteboe RN March 25, 2024, 11:12 AM

## 2024-03-25 NOTE — TELEPHONE ENCOUNTER
CHADS VASC score is 0, and episode was <24 hours.  I'm glad it resolved and he feels ok today.  At this time I recommend he continue on metoprolol and asa for now.  IF it reoccurs before he sees [facility] in May will need to consider anticoagulation and antiarrythmic v afib ablation at that time.    Merry Yen PA-C 3/25/2024 2:18 PM

## 2024-03-25 NOTE — TELEPHONE ENCOUNTER
I left message for pt with Merry Yen's response. Pt to call if he has any recurring episodes of afib prior to visit in May. Pt to continue taking metoprolol and aspirin at this time. Tigist RAHMAN March 25, 2024, 2:28 PM

## 2024-04-10 ENCOUNTER — TELEPHONE (OUTPATIENT)
Dept: CARDIOLOGY | Facility: CLINIC | Age: 64
End: 2024-04-10
Payer: COMMERCIAL

## 2024-04-10 DIAGNOSIS — E78.5 HYPERLIPIDEMIA WITH TARGET LDL LESS THAN 130: Primary | ICD-10-CM

## 2024-04-10 NOTE — TELEPHONE ENCOUNTER
I called pt and reviewed Merry Yen's response. Pt will send an ECG tracing if he has any more episodes. Pt said he would like to have a fasting lipid lab prior to his visit with Dr.Foster Seo. I will have our scheduling team call him to set up his lab. Tigist RAHMAN April 10, 2024, 3:54 PM

## 2024-04-10 NOTE — TELEPHONE ENCOUNTER
I called pt back. He would like Merry Yen PA-C to be updated that his HR went up to 187 bpm suddenly during a leisurely indoor cycling workout yesterday (his goal HR for the work out was 110 bpm). It stayed elevated for around 1.5 minutes and then came back down with rest. Pt felt like his breathing a was a little more labored during the period of elevated HR. Pt said his apple watch did not alert him to any afib. He also did an ECG at the time and it did not report afib. Pt has a 5/7 visit with Dr.Foster Seo. I told pt to call if he has any recurring episodes. Will send an FYI to Merry. Will also check with Merry to see if she wants pt to have a fasting lipid prior to visit in May. Pt had 5/11/23 visit with Merry Yen and she mentioned that pt may need to change from metoprolol to diltiazem if LDL still elevated this year. His last flp check was 3/2023. Tigist RAHMAN April 10, 2024, 10:40 AM

## 2024-04-10 NOTE — TELEPHONE ENCOUNTER
Health Call Center    Phone Message    May a detailed message be left on voicemail: yes     Reason for Call: Patient called requesting to speak with Merry. Patient states his heart sped up yesterday while working out and would like to know if this is cause for concern. Please call back to further discuss.    Action Taken: Message routed to:  Other: Cardiology    Travel Screening: Not Applicable    Thank you!  [facility]

## 2024-04-10 NOTE — TELEPHONE ENCOUNTER
Pls add on a fasting lipid panel before visit with [facility].   He could have had an SVT causing the elev HR that the apple watch didn't catch.  If that happens he should try and get an ECG at that time even if it means stopping his workout.  If he has an ecg send it to us, otherwise follow until visit with [facility].    Merry Yen PA-C 4/10/2024 3:09 PM    .

## 2024-04-15 ENCOUNTER — LAB (OUTPATIENT)
Dept: LAB | Facility: CLINIC | Age: 64
End: 2024-04-15
Payer: COMMERCIAL

## 2024-04-15 DIAGNOSIS — E78.5 HYPERLIPIDEMIA WITH TARGET LDL LESS THAN 130: ICD-10-CM

## 2024-04-15 PROCEDURE — 80061 LIPID PANEL: CPT

## 2024-04-15 PROCEDURE — 36415 COLL VENOUS BLD VENIPUNCTURE: CPT

## 2024-04-16 LAB
CHOLEST SERPL-MCNC: 185 MG/DL
FASTING STATUS PATIENT QL REPORTED: YES
HDLC SERPL-MCNC: 74 MG/DL
LDLC SERPL CALC-MCNC: 102 MG/DL
NONHDLC SERPL-MCNC: 111 MG/DL
TRIGL SERPL-MCNC: 43 MG/DL

## 2024-05-04 ENCOUNTER — HEALTH MAINTENANCE LETTER (OUTPATIENT)
Age: 64
End: 2024-05-04

## 2024-05-07 ENCOUNTER — OFFICE VISIT (OUTPATIENT)
Dept: CARDIOLOGY | Facility: CLINIC | Age: 64
End: 2024-05-07
Payer: COMMERCIAL

## 2024-05-07 VITALS
WEIGHT: 193 LBS | DIASTOLIC BLOOD PRESSURE: 70 MMHG | OXYGEN SATURATION: 99 % | SYSTOLIC BLOOD PRESSURE: 126 MMHG | HEART RATE: 82 BPM | HEIGHT: 75 IN | BODY MASS INDEX: 24 KG/M2

## 2024-05-07 DIAGNOSIS — I48.91 ATRIAL FIBRILLATION WITH RAPID VENTRICULAR RESPONSE (H): ICD-10-CM

## 2024-05-07 PROCEDURE — 99213 OFFICE O/P EST LOW 20 MIN: CPT | Performed by: INTERNAL MEDICINE

## 2024-05-07 RX ORDER — METOPROLOL SUCCINATE 50 MG/1
50 TABLET, EXTENDED RELEASE ORAL EVERY EVENING
Qty: 90 TABLET | Refills: 3 | Status: SHIPPED | OUTPATIENT
Start: 2024-05-07

## 2024-05-07 NOTE — PROGRESS NOTES
HPI and Plan:   It is my pleasure to see your patient Gabe Moon who is a pleasant 64-year-old patient who years ago developed an episode of atrial fibrillation.  2 electrical cardioversions failed to convert him back to sinus rhythm.  Then he spontaneously converted back to sinus rhythm himself.  He does take metoprolol succinate 50 mg/day.  He is [id number] score is 0.    With that background in mind the first episode of atrial fibrillation that he has had since that time occurred approximately a month ago when he was in [place] skiing with his friends.  He had significantly more alcohol in the evening time that he had had previously and also was somewhat tired.  He went back into atrial fibrillation which lasted about 2 to 3 hours and then he spontaneously converted back to sinus rhythm.  He had no further episodes of atrial fibrillation since that time.      His blood pressure was mildly raised when he came in but on recheck it came right back into the normal range when I rechecked the blood pressure at the end of our visit.    Impression:  1.  Paroxysmal symptomatic atrial fibrillation.  The patient is had 1 episode of atrial fibrillation in 1 year with a spontaneous conversion back to sinus rhythm.  Alcohol appears to be a significant stimulus to the development of atrial fibrillation though he drinks infrequently'.    Plan:  1.  At this particular time we will continue to observe.  He is episodes of atrial fibrillation are infrequent but if the episodes start to become more frequent and start interfering with his lifestyle they we will need to consider antiarrhythmic drugs or ablation procedure.  He is a very active man who exercises frequently and likes to ski and bike and run and so if this starts to interfere with his ability to do these things then we need to consider suppression of the atrial fibrillation.  I will have him continue the aspirin and the beta-blocker.    As always the patient is been  told to contact me with any questions or any concerns especially if he starts develop recurrent atrial fibrillation.    Emmanuel Pugh MD, FACC, FRCPI        Orders Placed This Encounter   Procedures    Lipid Profile    ALT    Follow-Up with Cardiology       Orders Placed This Encounter   Medications    metoprolol succinate ER (TOPROL XL) 50 MG 24 hr tablet     Sig: Take 1 tablet (50 mg) by mouth every evening     Dispense:  90 tablet     Refill:  3       Medications Discontinued During This Encounter   Medication Reason    nirmatrelvir and ritonavir (PAXLOVID) 300 mg/100 mg therapy pack Therapy completed (No AVS)    metoprolol succinate ER (TOPROL XL) 50 MG 24 hr tablet Reorder (No AVS)         Encounter Diagnosis   Name Primary?    Atrial fibrillation with rapid ventricular response (H)        CURRENT MEDICATIONS:  Current Outpatient Medications   Medication Sig Dispense Refill    Aspirin 81 MG CAPS       metoprolol succinate ER (TOPROL XL) 50 MG 24 hr tablet Take 1 tablet (50 mg) by mouth every evening 90 tablet 3    multivitamin, therapeutic with minerals (MULTI-VITAMIN) TABS tablet Take 1 tablet by mouth daily 100 tablet 0       ALLERGIES   No Known Allergies    PAST MEDICAL HISTORY:  Past Medical History:   Diagnosis Date    Atrial fibrillation with rapid ventricular response (H) 6/25/2022    Blood in semen     Inguinal hernia of right side with gangrene 2003    Low back pain     Prostate infection     STD (sexually transmitted disease)     Varicose vein of leg        PAST SURGICAL HISTORY:  Past Surgical History:   Procedure Laterality Date    ABDOMEN SURGERY  may 1991    testicular varicose vein repair    COLONOSCOPY  May 2013    MRI Virtual Colonoscopy    COLONOSCOPY N/A 4/1/2021    Procedure: COLONOSCOPY;  Surgeon: Gage Mckee MD;  Location: [facility]    HERNIA REPAIR  2003    inguinal    LIGATE VEIN      '93 scrotum, '14 left leg    SOFT TISSUE SURGERY  Jan 2002    Lumbar spine issues, facet joint         FAMILY HISTORY:  Family History   Problem Relation Age of Onset    Breast Cancer Mother     Alzheimer Disease Mother     Depression Mother     Mental Illness Mother         Bi-Polar, Dementia, passed away age 84    Arrhythmia Mother         fast HR, continued on meds, no blood thinners    Coronary Artery Disease Mother     Atrial fibrillation Mother     Diabetes Father         Type II    Hypertension Father         managed with Medication    Prostate Cancer Father         diagnosis at age 77, now 88    Osteoarthritis Father         hip replacement (overwt)    Coronary Artery Disease Father         Congestive Heart Failure, diagnonsed 2019    Obesity Father     Osteoarthritis Paternal Grandfather         hip replacements    Prostate Cancer Brother 55    Arrhythmia Brother 60        ER visit, meds x 10 days; reported as afib    Atrial fibrillation Brother     Prostate Cancer Brother         diagnosis 2021, treatment pending    Colon Cancer Maternal Cousin 53         of colon cancer at 58       SOCIAL HISTORY:  Social History     Socioeconomic History    Marital status:      Spouse name: None    Number of children: None    Years of education: None    Highest education level: None   Occupational History     Comment: office furniture sales, [employer]   Tobacco Use    Smoking status: Never    Smokeless tobacco: Never   Vaping Use    Vaping status: Never Used   Substance and Sexual Activity    Alcohol use: Yes     Comment: occ    Drug use: No     Comment: once daily    Sexual activity: Yes     Partners: Female     Birth control/protection: Condom, I.U.D.   Other Topics Concern    Parent/sibling w/ CABG, MI or angioplasty before 65F 55M? No       Review of Systems:  Skin:          Eyes:         ENT:         Respiratory:          Cardiovascular:         Gastroenterology:        Genitourinary:         Musculoskeletal:         Neurologic:         Psychiatric:         Heme/Lymph/Imm:        "  Endocrine:           Physical Exam:  Vitals: /70   Pulse 82   Ht 1.892 m (6' 2.5\")   Wt 87.5 kg (193 lb)   SpO2 99%   BMI 24.45 kg/m      Constitutional:  cooperative, alert and oriented, well developed, well nourished, in no acute distress thin      Skin:  warm and dry to the touch, no apparent skin lesions or masses noted          Head:  normocephalic        Eyes:  pupils equal and round, conjunctivae and lids unremarkable, sclera white, no xanthalasma, EOMS intact, no nystagmus        Lymph:No Cervical lymphadenopathy present     ENT:  no pallor or cyanosis, dentition good        Neck:  carotid pulses are full and equal bilaterally, JVP normal, no carotid bruit        Respiratory:  normal breath sounds, clear to auscultation, normal A-P diameter, normal symmetry, normal respiratory excursion, no use of accessory muscles         Cardiac: regular rhythm, normal S1/S2, no S3 or S4, apical impulse not displaced, no murmurs, gallops or rubs                pulses full and equal, no bruits auscultated                                        GI:  not assessed this visit        Extremities and Muscular Skeletal:  no deformities, clubbing, cyanosis, erythema observed;no edema              Neurological:  no gross motor deficits;affect appropriate        Psych:  Alert and Oriented x 3        CC  Merry Yen PA-C  [address]  [place] [id number]                "

## 2024-05-07 NOTE — LETTER
5/7/2024    Felicia Vásquez MD  [address]  [place] [address]    RE: Gabe Moon       Dear Colleague,     I had the pleasure of seeing Gabe Moon in the [facility].  HPI and Plan:   It is my pleasure to see your patient Gabe Moon who is a pleasant 64-year-old patient who years ago developed an episode of atrial fibrillation.  2 electrical cardioversions failed to convert him back to sinus rhythm.  Then he spontaneously converted back to sinus rhythm himself.  He does take metoprolol succinate 50 mg/day.  He is [id number] score is 0.    With that background in mind the first episode of atrial fibrillation that he has had since that time occurred approximately a month ago when he was in [place] skiing with his friends.  He had significantly more alcohol in the evening time that he had had previously and also was somewhat tired.  He went back into atrial fibrillation which lasted about 2 to 3 hours and then he spontaneously converted back to sinus rhythm.  He had no further episodes of atrial fibrillation since that time.      His blood pressure was mildly raised when he came in but on recheck it came right back into the normal range when I rechecked the blood pressure at the end of our visit.    Impression:  1.  Paroxysmal symptomatic atrial fibrillation.  The patient is had 1 episode of atrial fibrillation in 1 year with a spontaneous conversion back to sinus rhythm.  Alcohol appears to be a significant stimulus to the development of atrial fibrillation though he drinks infrequently'.    Plan:  1.  At this particular time we will continue to observe.  He is episodes of atrial fibrillation are infrequent but if the episodes start to become more frequent and start interfering with his lifestyle they we will need to consider antiarrhythmic drugs or ablation procedure.  He is a very active man who exercises frequently and likes to ski and bike and run and so if  this starts to interfere with his ability to do these things then we need to consider suppression of the atrial fibrillation.  I will have him continue the aspirin and the beta-blocker.    As always the patient is been told to contact me with any questions or any concerns especially if he starts develop recurrent atrial fibrillation.    Emmanuel Pugh MD, FACC, FRCPI        Orders Placed This Encounter   Procedures    Lipid Profile    ALT    Follow-Up with Cardiology       Orders Placed This Encounter   Medications    metoprolol succinate ER (TOPROL XL) 50 MG 24 hr tablet     Sig: Take 1 tablet (50 mg) by mouth every evening     Dispense:  90 tablet     Refill:  3       Medications Discontinued During This Encounter   Medication Reason    nirmatrelvir and ritonavir (PAXLOVID) 300 mg/100 mg therapy pack Therapy completed (No AVS)    metoprolol succinate ER (TOPROL XL) 50 MG 24 hr tablet Reorder (No AVS)         Encounter Diagnosis   Name Primary?    Atrial fibrillation with rapid ventricular response (H)        CURRENT MEDICATIONS:  Current Outpatient Medications   Medication Sig Dispense Refill    Aspirin 81 MG CAPS       metoprolol succinate ER (TOPROL XL) 50 MG 24 hr tablet Take 1 tablet (50 mg) by mouth every evening 90 tablet 3    multivitamin, therapeutic with minerals (MULTI-VITAMIN) TABS tablet Take 1 tablet by mouth daily 100 tablet 0       ALLERGIES   No Known Allergies    PAST MEDICAL HISTORY:  Past Medical History:   Diagnosis Date    Atrial fibrillation with rapid ventricular response (H) 6/25/2022    Blood in semen     Inguinal hernia of right side with gangrene 2003    Low back pain     Prostate infection     STD (sexually transmitted disease)     Varicose vein of leg        PAST SURGICAL HISTORY:  Past Surgical History:   Procedure Laterality Date    ABDOMEN SURGERY  may 1991    testicular varicose vein repair    COLONOSCOPY  May 2013    MRI Virtual Colonoscopy    COLONOSCOPY N/A 4/1/2021     Procedure: COLONOSCOPY;  Surgeon: Gage Mckee MD;  Location:  GI    HERNIA REPAIR      inguinal    LIGATE VEIN      '93 scrotum, '14 left leg    SOFT TISSUE SURGERY  2002    Lumbar spine issues, facet joint sundrome       FAMILY HISTORY:  Family History   Problem Relation Age of Onset    Breast Cancer Mother     Alzheimer Disease Mother     Depression Mother     Mental Illness Mother         Bi-Polar, Dementia, passed away age 84    Arrhythmia Mother         fast HR, continued on meds, no blood thinners    Coronary Artery Disease Mother     Atrial fibrillation Mother     Diabetes Father         Type II    Hypertension Father         managed with Medication    Prostate Cancer Father         diagnosis at age 77, now 88    Osteoarthritis Father         hip replacement (overwt)    Coronary Artery Disease Father         Congestive Heart Failure, diagnonsed     Obesity Father     Osteoarthritis Paternal Grandfather         hip replacements    Prostate Cancer Brother 55    Arrhythmia Brother 60        ER visit, meds x 10 days; reported as afib    Atrial fibrillation Brother     Prostate Cancer Brother         diagnosis 2021, treatment pending    Colon Cancer Maternal Cousin 53         of colon cancer at 58       SOCIAL HISTORY:  Social History     Socioeconomic History    Marital status:      Spouse name: None    Number of children: None    Years of education: None    Highest education level: None   Occupational History     Comment: office furniture sales, [employer]   Tobacco Use    Smoking status: Never    Smokeless tobacco: Never   Vaping Use    Vaping status: Never Used   Substance and Sexual Activity    Alcohol use: Yes     Comment: occ    Drug use: No     Comment: once daily    Sexual activity: Yes     Partners: Female     Birth control/protection: Condom, I.U.D.   Other Topics Concern    Parent/sibling w/ CABG, MI or angioplasty before 65F 55M? No       Review of Systems:  Skin:   "        Eyes:         ENT:         Respiratory:          Cardiovascular:         Gastroenterology:        Genitourinary:         Musculoskeletal:         Neurologic:         Psychiatric:         Heme/Lymph/Imm:         Endocrine:           Physical Exam:  Vitals: /70   Pulse 82   Ht 1.892 m (6' 2.5\")   Wt 87.5 kg (193 lb)   SpO2 99%   BMI 24.45 kg/m      Constitutional:  cooperative, alert and oriented, well developed, well nourished, in no acute distress thin      Skin:  warm and dry to the touch, no apparent skin lesions or masses noted          Head:  normocephalic        Eyes:  pupils equal and round, conjunctivae and lids unremarkable, sclera white, no xanthalasma, EOMS intact, no nystagmus        Lymph:No Cervical lymphadenopathy present     ENT:  no pallor or cyanosis, dentition good        Neck:  carotid pulses are full and equal bilaterally, JVP normal, no carotid bruit        Respiratory:  normal breath sounds, clear to auscultation, normal A-P diameter, normal symmetry, normal respiratory excursion, no use of accessory muscles         Cardiac: regular rhythm, normal S1/S2, no S3 or S4, apical impulse not displaced, no murmurs, gallops or rubs                pulses full and equal, no bruits auscultated                                        GI:  not assessed this visit        Extremities and Muscular Skeletal:  no deformities, clubbing, cyanosis, erythema observed;no edema              Neurological:  no gross motor deficits;affect appropriate        Psych:  Alert and Oriented x 3        CC  Merry Yen PA-C  [address]  [place] [address]    Thank you for allowing me to participate in the care of your patient.      Sincerely,     Emmanuel Pugh MD, MD     [facility]  "

## 2024-05-08 SDOH — HEALTH STABILITY: PHYSICAL HEALTH: ON AVERAGE, HOW MANY DAYS PER WEEK DO YOU ENGAGE IN MODERATE TO STRENUOUS EXERCISE (LIKE A BRISK WALK)?: 6 DAYS

## 2024-05-08 SDOH — HEALTH STABILITY: PHYSICAL HEALTH: ON AVERAGE, HOW MANY MINUTES DO YOU ENGAGE IN EXERCISE AT THIS LEVEL?: 40 MIN

## 2024-05-08 ASSESSMENT — SOCIAL DETERMINANTS OF HEALTH (SDOH): HOW OFTEN DO YOU GET TOGETHER WITH FRIENDS OR RELATIVES?: ONCE A WEEK

## 2024-05-14 ENCOUNTER — OFFICE VISIT (OUTPATIENT)
Dept: URGENT CARE | Facility: URGENT CARE | Age: 64
End: 2024-05-14
Payer: COMMERCIAL

## 2024-05-14 VITALS
TEMPERATURE: 96.7 F | DIASTOLIC BLOOD PRESSURE: 78 MMHG | SYSTOLIC BLOOD PRESSURE: 133 MMHG | OXYGEN SATURATION: 98 % | RESPIRATION RATE: 16 BRPM | HEART RATE: 74 BPM

## 2024-05-14 DIAGNOSIS — L72.3 SEBACEOUS CYST OF RIGHT AXILLA: ICD-10-CM

## 2024-05-14 DIAGNOSIS — L08.9 INFECTED CYST OF SKIN: Primary | ICD-10-CM

## 2024-05-14 DIAGNOSIS — L72.9 INFECTED CYST OF SKIN: Primary | ICD-10-CM

## 2024-05-14 LAB
BASOPHILS # BLD AUTO: 0 10E3/UL (ref 0–0.2)
BASOPHILS NFR BLD AUTO: 0 %
EOSINOPHIL # BLD AUTO: 0.1 10E3/UL (ref 0–0.7)
EOSINOPHIL NFR BLD AUTO: 2 %
ERYTHROCYTE [DISTWIDTH] IN BLOOD BY AUTOMATED COUNT: 12.4 % (ref 10–15)
HCT VFR BLD AUTO: 43.7 % (ref 40–53)
HGB BLD-MCNC: 14.5 G/DL (ref 13.3–17.7)
IMM GRANULOCYTES # BLD: 0 10E3/UL
IMM GRANULOCYTES NFR BLD: 0 %
LYMPHOCYTES # BLD AUTO: 1 10E3/UL (ref 0.8–5.3)
LYMPHOCYTES NFR BLD AUTO: 20 %
MCH RBC QN AUTO: 31.6 PG (ref 26.5–33)
MCHC RBC AUTO-ENTMCNC: 33.2 G/DL (ref 31.5–36.5)
MCV RBC AUTO: 95 FL (ref 78–100)
MONOCYTES # BLD AUTO: 0.3 10E3/UL (ref 0–1.3)
MONOCYTES NFR BLD AUTO: 6 %
NEUTROPHILS # BLD AUTO: 3.6 10E3/UL (ref 1.6–8.3)
NEUTROPHILS NFR BLD AUTO: 72 %
PLATELET # BLD AUTO: 117 10E3/UL (ref 150–450)
RBC # BLD AUTO: 4.59 10E6/UL (ref 4.4–5.9)
WBC # BLD AUTO: 5 10E3/UL (ref 4–11)

## 2024-05-14 PROCEDURE — 87076 CULTURE ANAEROBE IDENT EACH: CPT | Performed by: NURSE PRACTITIONER

## 2024-05-14 PROCEDURE — 87070 CULTURE OTHR SPECIMN AEROBIC: CPT | Performed by: NURSE PRACTITIONER

## 2024-05-14 PROCEDURE — 85025 COMPLETE CBC W/AUTO DIFF WBC: CPT | Performed by: NURSE PRACTITIONER

## 2024-05-14 PROCEDURE — 87205 SMEAR GRAM STAIN: CPT | Performed by: NURSE PRACTITIONER

## 2024-05-14 PROCEDURE — 36415 COLL VENOUS BLD VENIPUNCTURE: CPT | Performed by: NURSE PRACTITIONER

## 2024-05-14 PROCEDURE — 10060 I&D ABSCESS SIMPLE/SINGLE: CPT | Performed by: NURSE PRACTITIONER

## 2024-05-14 RX ORDER — DOXYCYCLINE HYCLATE 100 MG
TABLET ORAL
COMMUNITY
Start: 2024-05-12 | End: 2024-06-05

## 2024-05-14 NOTE — PATIENT INSTRUCTIONS
Continue oral antibiotic culture pending platelets are a little low otherwise normal complete blood count no indication of worsening infection.   Follow-up with your primary care provider as scheduled

## 2024-05-14 NOTE — RESULT ENCOUNTER NOTE
Results discussed with patient in clinic. States understanding of these results.    Rachel Martinez CNP

## 2024-05-14 NOTE — PROGRESS NOTES
Assessment & Plan      Diagnosis Comments   1. Infected cyst of skin  CBC with platelets and differential, Skin Aerobic Bacterial Culture Routine With Gram Stain, DRAIN SKIN ABSCESS SIMPLE/SINGLE       2. Sebaceous cyst of right axilla  CBC with platelets and differential, Skin Aerobic Bacterial Culture Routine With Gram Stain, DRAIN SKIN ABSCESS SIMPLE/SINGLE         CBC indicative of thrombocytopenia patient has had this in the past he will follow-up with his primary care provider in regards to this.  Otherwise no indication for worsening infection we will continue doxycycline as directed wound culture was sent  Patient will monitor area closely we discussed red flag symptoms that would warrant emergent or urgent evaluation.  Patient does have an appointment with his primary care provider tomorrow.    Rachel Martinez, MARY [facility] G[facility]MAN Bernal is a 64 year old male who presents to clinic today for the following health issues:  Chief Complaint   Patient presents with    Mass     Lump near right axilla since 3 days ago getting worse, redness, pain, using warm compress, taking Doxycycline twice daily- had a E-visit on 3/12/2024 with other health group      HPI    Patient presents to clinic complaining of cyst near right axilla area noted about 3 days ago he contacted his primary care provider and started doxycycline has been taking this twice daily he is on his third day.  States swelling has gotten worse with tenderness.  Denies fever.  He has been using warm compresses at home.      Review of Systems  Constitutional, HEENT, cardiovascular, pulmonary, gi and gu systems are negative, except as otherwise noted.      Objective    /78 (BP Location: Left arm, Patient Position: Sitting, Cuff Size: Adult Large)   Pulse 74   Temp (!) 96.7  F (35.9  C) (Tympanic)   Resp 16   SpO2 98%   Physical Exam   GENERAL: alert and no distress  NECK: no adenopathy, no asymmetry,  masses, or scars  RESP: lungs clear to auscultation - no rales, rhonchi or wheezes  CV: regular rate and rhythm, normal S1 S2, no S3 or S4, no murmur, click or rub, no peripheral edema  MS: no gross musculoskeletal defects noted, no edema  SKIN: Cyst noted right axillary area with erythema, warmth, tenderness and fluctuance.  Area was cleansed using iodine and 0.5 mL lidocaine with epi injected a 11 scalpel was used to domi the cyst which was drained of purulent drainage a wound culture was obtained and sent to lab.  Patient tolerated this well.  Bleeding was controlled with compression area was cleaned and a dressing was applied.    Results for orders placed or performed in visit on 05/14/24   CBC with platelets and differential     Status: Abnormal   Result Value Ref Range    WBC Count 5.0 4.0 - 11.0 10e3/uL    RBC Count 4.59 4.40 - 5.90 10e6/uL    Hemoglobin 14.5 13.3 - 17.7 g/dL    Hematocrit 43.7 40.0 - 53.0 %    MCV 95 78 - 100 fL    MCH 31.6 26.5 - 33.0 pg    MCHC 33.2 31.5 - 36.5 g/dL    RDW 12.4 10.0 - 15.0 %    Platelet Count 117 (L) 150 - 450 10e3/uL    % Neutrophils 72 %    % Lymphocytes 20 %    % Monocytes 6 %    % Eosinophils 2 %    % Basophils 0 %    % Immature Granulocytes 0 %    Absolute Neutrophils 3.6 1.6 - 8.3 10e3/uL    Absolute Lymphocytes 1.0 0.8 - 5.3 10e3/uL    Absolute Monocytes 0.3 0.0 - 1.3 10e3/uL    Absolute Eosinophils 0.1 0.0 - 0.7 10e3/uL    Absolute Basophils 0.0 0.0 - 0.2 10e3/uL    Absolute Immature Granulocytes 0.0 <=0.4 10e3/uL   CBC with platelets and differential     Status: Abnormal    Narrative    The following orders were created for panel order CBC with platelets and differential.  Procedure                               Abnormality         Status                     ---------                               -----------         ------                     CBC with platelets and d...[[id number]]  Abnormal            Final result                 Please view results for these  tests on the individual orders.

## 2024-05-15 ENCOUNTER — PATIENT OUTREACH (OUTPATIENT)
Dept: ONCOLOGY | Facility: CLINIC | Age: 64
End: 2024-05-15

## 2024-05-15 ENCOUNTER — OFFICE VISIT (OUTPATIENT)
Dept: FAMILY MEDICINE | Facility: CLINIC | Age: 64
End: 2024-05-15
Payer: COMMERCIAL

## 2024-05-15 VITALS
DIASTOLIC BLOOD PRESSURE: 80 MMHG | OXYGEN SATURATION: 99 % | BODY MASS INDEX: 23.41 KG/M2 | RESPIRATION RATE: 18 BRPM | TEMPERATURE: 97.1 F | HEART RATE: 63 BPM | WEIGHT: 188.3 LBS | SYSTOLIC BLOOD PRESSURE: 138 MMHG | HEIGHT: 75 IN

## 2024-05-15 DIAGNOSIS — Z80.42 FAMILY HISTORY OF PROSTATE CANCER: ICD-10-CM

## 2024-05-15 DIAGNOSIS — Z00.00 ENCOUNTER FOR ROUTINE ADULT HEALTH EXAMINATION WITHOUT ABNORMAL FINDINGS: Primary | ICD-10-CM

## 2024-05-15 DIAGNOSIS — D69.6 THROMBOCYTOPENIA (H): ICD-10-CM

## 2024-05-15 DIAGNOSIS — I48.91 ATRIAL FIBRILLATION WITH RAPID VENTRICULAR RESPONSE (H): ICD-10-CM

## 2024-05-15 DIAGNOSIS — L08.9 INFECTED SEBACEOUS CYST: ICD-10-CM

## 2024-05-15 DIAGNOSIS — L72.3 INFECTED SEBACEOUS CYST: ICD-10-CM

## 2024-05-15 DIAGNOSIS — Z83.3 FAMILY HISTORY OF DIABETES MELLITUS: ICD-10-CM

## 2024-05-15 LAB
ALBUMIN SERPL BCG-MCNC: 4.6 G/DL (ref 3.5–5.2)
ALP SERPL-CCNC: 64 U/L (ref 40–150)
ALT SERPL W P-5'-P-CCNC: 27 U/L (ref 0–70)
ANION GAP SERPL CALCULATED.3IONS-SCNC: 11 MMOL/L (ref 7–15)
AST SERPL W P-5'-P-CCNC: 27 U/L (ref 0–45)
BILIRUB SERPL-MCNC: 0.8 MG/DL
BUN SERPL-MCNC: 15.1 MG/DL (ref 8–23)
CALCIUM SERPL-MCNC: 9.6 MG/DL (ref 8.8–10.2)
CHLORIDE SERPL-SCNC: 101 MMOL/L (ref 98–107)
CREAT SERPL-MCNC: 0.87 MG/DL (ref 0.67–1.17)
DEPRECATED HCO3 PLAS-SCNC: 28 MMOL/L (ref 22–29)
EGFRCR SERPLBLD CKD-EPI 2021: >90 ML/MIN/1.73M2
ERYTHROCYTE [DISTWIDTH] IN BLOOD BY AUTOMATED COUNT: 12.4 % (ref 10–15)
GLUCOSE SERPL-MCNC: 97 MG/DL (ref 70–99)
HBA1C MFR BLD: 5.2 % (ref 0–5.6)
HCT VFR BLD AUTO: 48.3 % (ref 40–53)
HGB BLD-MCNC: 15.9 G/DL (ref 13.3–17.7)
MCH RBC QN AUTO: 31.7 PG (ref 26.5–33)
MCHC RBC AUTO-ENTMCNC: 32.9 G/DL (ref 31.5–36.5)
MCV RBC AUTO: 96 FL (ref 78–100)
PLATELET # BLD AUTO: 140 10E3/UL (ref 150–450)
POTASSIUM SERPL-SCNC: 3.7 MMOL/L (ref 3.4–5.3)
PROT SERPL-MCNC: 7.1 G/DL (ref 6.4–8.3)
PSA SERPL DL<=0.01 NG/ML-MCNC: 3.06 NG/ML (ref 0–4.5)
RBC # BLD AUTO: 5.02 10E6/UL (ref 4.4–5.9)
SODIUM SERPL-SCNC: 140 MMOL/L (ref 135–145)
WBC # BLD AUTO: 3.8 10E3/UL (ref 4–11)

## 2024-05-15 PROCEDURE — 36415 COLL VENOUS BLD VENIPUNCTURE: CPT | Performed by: FAMILY MEDICINE

## 2024-05-15 PROCEDURE — 90480 ADMN SARSCOV2 VAC 1/ONLY CMP: CPT | Performed by: FAMILY MEDICINE

## 2024-05-15 PROCEDURE — 99213 OFFICE O/P EST LOW 20 MIN: CPT | Mod: 24 | Performed by: FAMILY MEDICINE

## 2024-05-15 PROCEDURE — 99396 PREV VISIT EST AGE 40-64: CPT | Mod: 24 | Performed by: FAMILY MEDICINE

## 2024-05-15 PROCEDURE — 83036 HEMOGLOBIN GLYCOSYLATED A1C: CPT | Performed by: FAMILY MEDICINE

## 2024-05-15 PROCEDURE — 85027 COMPLETE CBC AUTOMATED: CPT | Performed by: FAMILY MEDICINE

## 2024-05-15 PROCEDURE — 80053 COMPREHEN METABOLIC PANEL: CPT | Performed by: FAMILY MEDICINE

## 2024-05-15 PROCEDURE — 91320 SARSCV2 VAC 30MCG TRS-SUC IM: CPT | Performed by: FAMILY MEDICINE

## 2024-05-15 PROCEDURE — G0103 PSA SCREENING: HCPCS | Performed by: FAMILY MEDICINE

## 2024-05-15 ASSESSMENT — PAIN SCALES - GENERAL: PAINLEVEL: MILD PAIN (2)

## 2024-05-15 NOTE — PROGRESS NOTES
Hematology referral reviewed for [facility], see below.    Referral reason: referral received for chronically low platelets dating back to 2022, also with new low WBC    Clinical question entered by referring provider or through order transcription: thrombocytopenia     Referral received via: Internal referral by [facility]    Current abnormal labs: Available in Chart Review    Outreach: Detailed voicemail left regarding referral. and MyChart sent to patient    Plan: Triage instructions updated and sent to NPS for completion.

## 2024-05-15 NOTE — PROGRESS NOTES
Preventive Care Visit  [facility]  Felicia Vásquez MD, Family Medicine  May 15, 2024      Assessment & Plan     Encounter for routine adult health examination without abnormal findings  Overall healthy and eating healthy and exercises on a regular basis   Will check labs   - REVIEW OF HEALTH MAINTENANCE PROTOCOL ORDERS  - Comprehensive metabolic panel (BMP + Alb, Alk Phos, ALT, AST, Total. Bili, TP); Future  - COVID-19 12+ (2023-24) (PFIZER)  - Comprehensive metabolic panel (BMP + Alb, Alk Phos, ALT, AST, Total. Bili, TP)    Family history of prostate cancer  Will screen   - PSA, screen; Future  - PSA, screen    Family history of diabetes mellitus  Will check today   - Hemoglobin A1c; Future  - Comprehensive metabolic panel (BMP + Alb, Alk Phos, ALT, AST, Total. Bili, TP); Future  - Hemoglobin A1c  - Comprehensive metabolic panel (BMP + Alb, Alk Phos, ALT, AST, Total. Bili, TP)    Atrial fibrillation with rapid ventricular response (H)  Only when tired and when consuming alcohol, he has reduced the alcohol consumption significantly   Saw cardiology , currently is stable,  on metoprolol daily 50 mg     Thrombocytopenia (H24)  We discussed rechecking the platelets and if low , I have advised seeing ahematologist    Referral placed in his chart  - CBC with platelets; Future  - Adult Oncology/Hematology  Referral; Future  - CBC with platelets; Future  - CBC with platelets    Infected sebaceous cyst - was seen yesterday at  and the cyst was I and D, pus came out , he was started on doxycycline and pt feels better today already , seems that the area is healing well     Patient has been advised of split billing requirements and indicates understanding: Yes        Counseling  Appropriate preventive services were discussed with this patient, including applicable screening as appropriate for fall prevention, nutrition, physical activity, Tobacco-use cessation, weight loss and cognition.   Checklist reviewing preventive services available has been given to the patient.          Jenni Bernal is a 64 year old, presenting for the following:  Physical        5/15/2024     9:20 AM   Additional Questions   Roomed by Starla RAHMAN        Health Care Directive  Patient does not have a Health Care Directive or Living Will: Patient states has Advance Directive and will bring in a copy to clinic.    HPI            5/8/2024   General Health   How would you rate your overall physical health? Good   Feel stress (tense, anxious, or unable to sleep) Only a little   (!) STRESS CONCERN      5/8/2024   Nutrition   Three or more servings of calcium each day? Yes   Diet: Regular (no restrictions)    Low fat/cholesterol   How many servings of fruit and vegetables per day? 4 or more   How many sweetened beverages each day? 0-1         5/8/2024   Exercise   Days per week of moderate/strenous exercise 6 days   Average minutes spent exercising at this level 40 min         5/8/2024   Social Factors   Frequency of gathering with friends or relatives Once a week   Worry food won't last until get money to buy more No   Food not last or not have enough money for food? No   Do you have housing?  Yes   Are you worried about losing your housing? No   Lack of transportation? No   Unable to get utilities (heat,electricity)? No         5/15/2024   Fall Risk   Gait Speed Test (Document in seconds) 4   Gait Speed Test Interpretation Less than or equal to 5.00 seconds - PASS          5/8/2024   Dental   Dentist two times every year? Yes         5/8/2024   TB Screening   Were you born outside of the [place]? No         Today's PHQ-2 Score:       5/14/2024    11:53 AM   PHQ-2 ( 1999 Pfizer)   Q1: Little interest or pleasure in doing things 0   Q2: Feeling down, depressed or hopeless 0   PHQ-2 Score 0   Q1: Little interest or pleasure in doing things Not at all   Q2: Feeling down, depressed or hopeless Not at all   PHQ-2 Score 0            5/8/2024   Substance Use   Alcohol more than 3/day or more than 7/wk No   Do you use any other substances recreationally? No     Social History     Tobacco Use    Smoking status: Never    Smokeless tobacco: Never   Vaping Use    Vaping status: Never Used   Substance Use Topics    Alcohol use: Not Currently     Comment: reduced to 3 drinks per week or less    Drug use: No     Comment: once daily           5/8/2024   STI Screening   New sexual partner(s) since last STI/HIV test? No   Last PSA:   PSA   Date Value Ref Range Status   02/19/2021 1.90 0 - 4 ug/L Final     Comment:     Assay Method:  Chemiluminescence using Siemens Vista analyzer     Prostate Specific Antigen Screen   Date Value Ref Range Status   03/21/2023 3.10 0.00 - 4.50 ng/mL Final   04/18/2022 2.66 0.00 - 4.00 ug/L Final     ASCVD Risk   The 10-year ASCVD risk score (Rosanna GAYLE, et al., 2019) is: 10.5%    Values used to calculate the score:      Age: 64 years      Sex: Male      Is Non- : No      Diabetic: No      Tobacco smoker: No      Systolic Blood Pressure: 150 mmHg      Is BP treated: No      HDL Cholesterol: 74 mg/dL      Total Cholesterol: 185 mg/dL           Reviewed and updated as needed this visit by Provider                    Past Medical History:   Diagnosis Date    Atrial fibrillation with rapid ventricular response (H) 6/25/2022    Blood in semen     Inguinal hernia of right side with gangrene 2003    Low back pain     Prostate infection     STD (sexually transmitted disease)     Varicose vein of leg      Past Surgical History:   Procedure Laterality Date    ABDOMEN SURGERY  may 1991    testicular varicose vein repair    COLONOSCOPY  May 2013    MRI Virtual Colonoscopy    COLONOSCOPY N/A 4/1/2021    Procedure: COLONOSCOPY;  Surgeon: Gage Mckee MD;  Location:  GI    HERNIA REPAIR  2003    inguinal    LIGATE VEIN      '93 scrotum, '14 left leg    SOFT TISSUE SURGERY  Jan 2002    Lumbar spine issues,  facet joint sundrome     Lab work is in process  Labs reviewed in EPIC  BP Readings from Last 3 Encounters:   05/15/24 138/80   05/14/24 133/78   05/07/24 126/70    Wt Readings from Last 3 Encounters:   05/15/24 85.4 kg (188 lb 4.8 oz)   05/07/24 87.5 kg (193 lb)   05/11/23 86.8 kg (191 lb 6.4 oz)                  Patient Active Problem List   Diagnosis    Family history of prostate cancer    Atrial fibrillation with rapid ventricular response (H)    Seborrheic keratoses    Family history of diabetes mellitus     Past Surgical History:   Procedure Laterality Date    ABDOMEN SURGERY  may 1991    testicular varicose vein repair    COLONOSCOPY  May 2013    MRI Virtual Colonoscopy    COLONOSCOPY N/A 4/1/2021    Procedure: COLONOSCOPY;  Surgeon: Gage Mckee MD;  Location: [facility]    HERNIA REPAIR  2003    inguinal    LIGATE VEIN      '93 scrotum, '14 left leg    SOFT TISSUE SURGERY  Jan 2002    Lumbar spine issues, facet joint sundrome       Social History     Tobacco Use    Smoking status: Never    Smokeless tobacco: Never   Substance Use Topics    Alcohol use: Not Currently     Comment: reduced to 3 drinks per week or less     Family History   Problem Relation Age of Onset    Breast Cancer Mother     Alzheimer Disease Mother     Depression Mother     Mental Illness Mother         Bi-Polar, Dementia, passed away age 84    Arrhythmia Mother         fast HR, continued on meds, no blood thinners    Coronary Artery Disease Mother     Atrial fibrillation Mother     Diabetes Father         Type II    Hypertension Father         managed with Medication    Prostate Cancer Father         diagnosis at age 77, now 94    Osteoarthritis Father         hip replacement (overwt)    Coronary Artery Disease Father         Congestive Heart Failure, diagnonsed 2019    Obesity Father     Osteoarthritis Paternal Grandfather         hip replacements    Prostate Cancer Brother 55    Arrhythmia Brother 60        ER visit, meds x 10 days;  "reported as afib    Atrial fibrillation Brother     Prostate Cancer Brother         diagnosis 2021, treatment successful    Colon Cancer Maternal Cousin 53         of colon cancer at 58         Current Outpatient Medications   Medication Sig Dispense Refill    Aspirin 81 MG CAPS       doxycycline hyclate (VIBRA-TABS) 100 MG tablet TAKE 1 TABLET BY MOUTH TWICE DAILY WITH FOOD FOR 7 DAYS      metoprolol succinate ER (TOPROL XL) 50 MG 24 hr tablet Take 1 tablet (50 mg) by mouth every evening 90 tablet 3    multivitamin, therapeutic with minerals (MULTI-VITAMIN) TABS tablet Take 1 tablet by mouth daily 100 tablet 0     No Known Allergies  Recent Labs   Lab Test 05/15/24  1004 04/15/24  1025 23  1153 22  0631 22  1351 22  0928 22  0928 21  0936   A1C 5.2  --  5.4  --   --   --   --   --    LDL  --  102* 118*  --   --   --  85 96   HDL  --  74 85  --   --   --  81 93   TRIG  --  43 60  --   --   --  58 49   ALT 27  --  23  --   --   --  31  --    CR 0.87  --  0.88   < > 0.92   < > 0.81 0.82   GFRESTIMATED >90  --  >90   < > >90   < > >90 >90   GFRESTBLACK  --   --   --   --   --   --   --  >90   POTASSIUM 3.7  --  4.6   < > 3.7   < > 4.1 4.0   TSH  --   --   --   --  1.59  --   --   --     < > = values in this interval not displayed.          Review of Systems  Constitutional, HEENT, cardiovascular, pulmonary, GI, , musculoskeletal, neuro, skin, endocrine and psych systems are negative, except as otherwise noted.     Objective    Exam  BP (!) 150/85   Pulse 63   Temp 97.1  F (36.2  C) (Temporal)   Resp 18   Ht 1.905 m (6' 3\")   Wt 85.4 kg (188 lb 4.8 oz)   SpO2 99%   BMI 23.54 kg/m     Estimated body mass index is 23.54 kg/m  as calculated from the following:    Height as of this encounter: 1.905 m (6' 3\").    Weight as of this encounter: 85.4 kg (188 lb 4.8 oz).    Physical Exam  GENERAL: alert and no distress  EYES: Eyes grossly normal to inspection, PERRL and " conjunctivae and sclerae normal  HENT: ear canals and TM's normal, nose and mouth without ulcers or lesions  NECK: no adenopathy, no asymmetry, masses, or scars  RESP: lungs clear to auscultation - no rales, rhonchi or wheezes  CV: regular rate and rhythm, normal S1 S2, no S3 or S4, no murmur, click or rub, no peripheral edema  ABDOMEN: soft, nontender, no hepatosplenomegaly, no masses and bowel sounds normal  MS: no gross musculoskeletal defects noted, no edema  SKIN: no suspicious lesions or rashes  NEURO: Normal strength and tone, mentation intact and speech normal  PSYCH: mentation appears normal, affect normal/bright        Signed Electronically by: Felicia Vásquez MD

## 2024-05-18 LAB
BACTERIA FLD CULT: ABNORMAL
GRAM STAIN RESULT: ABNORMAL

## 2024-05-24 ENCOUNTER — MYC MEDICAL ADVICE (OUTPATIENT)
Dept: FAMILY MEDICINE | Facility: CLINIC | Age: 64
End: 2024-05-24

## 2024-05-24 ENCOUNTER — LAB (OUTPATIENT)
Dept: LAB | Facility: CLINIC | Age: 64
End: 2024-05-24
Payer: COMMERCIAL

## 2024-05-24 DIAGNOSIS — D69.6 THROMBOCYTOPENIA (H): ICD-10-CM

## 2024-05-24 LAB
ERYTHROCYTE [DISTWIDTH] IN BLOOD BY AUTOMATED COUNT: 12.2 % (ref 10–15)
HCT VFR BLD AUTO: 47.1 % (ref 40–53)
HGB BLD-MCNC: 15.8 G/DL (ref 13.3–17.7)
MCH RBC QN AUTO: 31.7 PG (ref 26.5–33)
MCHC RBC AUTO-ENTMCNC: 33.5 G/DL (ref 31.5–36.5)
MCV RBC AUTO: 94 FL (ref 78–100)
PLATELET # BLD AUTO: 140 10E3/UL (ref 150–450)
RBC # BLD AUTO: 4.99 10E6/UL (ref 4.4–5.9)
WBC # BLD AUTO: 4.7 10E3/UL (ref 4–11)

## 2024-05-24 PROCEDURE — 36415 COLL VENOUS BLD VENIPUNCTURE: CPT

## 2024-05-24 PROCEDURE — 85027 COMPLETE CBC AUTOMATED: CPT

## 2024-06-05 ENCOUNTER — VIRTUAL VISIT (OUTPATIENT)
Dept: FAMILY MEDICINE | Facility: CLINIC | Age: 64
End: 2024-06-05
Payer: COMMERCIAL

## 2024-06-05 ENCOUNTER — NURSE TRIAGE (OUTPATIENT)
Dept: FAMILY MEDICINE | Facility: CLINIC | Age: 64
End: 2024-06-05

## 2024-06-05 DIAGNOSIS — U07.1 INFECTION DUE TO 2019 NOVEL CORONAVIRUS: Primary | ICD-10-CM

## 2024-06-05 PROCEDURE — 99213 OFFICE O/P EST LOW 20 MIN: CPT | Mod: 95 | Performed by: FAMILY MEDICINE

## 2024-06-05 NOTE — PROGRESS NOTES
Gabe is a 64 year old who is being evaluated via a billable video visit.    How would you like to obtain your AVS? MyChart  If the video visit is dropped, the invitation should be resent by: Text to cell phone: [phone]  Will anyone else be joining your video visit? No      Assessment & Plan     (U07.1) Infection due to 2019 novel coronavirus  (primary encounter diagnosis)  Comment: past the 5 day window and is actually feeling better  Discussed supportive cares for congestion and energy  Advised to let us know if symptoms worsen  Plan:                 Subjective   Gabe is a 64 year old, presenting for the following health issues:  No chief complaint on file.        6/5/2024    11:55 AM   Additional Questions   Roomed by [facility]       COVID-19 Symptom Review  How many days ago did these symptoms start? 5/31/24  Was traveling  No current illness noted  Little dizzy today not coughing much    Are any of the following symptoms significant for you?  New or worsening difficulty breathing? No  Worsening cough? No  Fever or chills? No  Headache: YES - now better  Sore throat: YES gone now  Chest pain: No  Diarrhea: No  Body aches? Noye sin the past    Fatigued still present  Has taken paxlovid in the past - felt this helped some  Sleeping ok  Cough is dry  Lots of sinus congestion  Has used tylenol sinus  What treatments has patient tried? Acetaminophen   Does patient live in a nursing home, group home, or shelter? No  Does patient have a way to get food/medications during quarantined? Yes, I have a friend or family member who can help me.                Review of Systems  Constitutional, HEENT, cardiovascular, pulmonary, gi and gu systems are negative, except as otherwise noted.      Objective           Vitals:  No vitals were obtained today due to virtual visit.    Physical Exam   GENERAL: alert and no distress  EYES: Eyes grossly normal to inspection.  No discharge or erythema, or obvious  scleral/conjunctival abnormalities.  RESP: No audible wheeze, cough, or visible cyanosis.    SKIN: Visible skin clear. No significant rash, abnormal pigmentation or lesions.  NEURO: Cranial nerves grossly intact.  Mentation and speech appropriate for age.  PSYCH: Appropriate affect, tone, and pace of words    Had a positive e covid test      Video-Visit Details  Joined the call at 6/5/2024, 1:53:39 pm.  Left the call at 6/5/2024, 2:01:39 pm.  You were on the call for 7 minutes 59 seconds  Type of service:  Video Visit   Originating Location (pt. Location): Home    Distant Location (provider location):  On-site  Platform used for Video Visit: Claudine  Signed Electronically by: Liset Armenta MD

## 2024-06-05 NOTE — TELEPHONE ENCOUNTER
Patient calling with positive home covid test  Requesting Paxlovid Rx  Scheduled for VV to discuss further  Wandy TROY RN      Reason for Disposition   COVID-19 diagnosed by positive lab test (e.g., PCR, rapid self-test kit) and mild symptoms (e.g., cough, fever, others) and no complications or SOB    Additional Information   Negative: SEVERE difficulty breathing (e.g., struggling for each breath, speaks in single words)   Negative: Difficult to awaken or acting confused (e.g., disoriented, slurred speech)   Negative: Bluish (or gray) lips or face now   Negative: Shock suspected (e.g., cold/pale/clammy skin, too weak to stand, low BP, rapid pulse)   Negative: Sounds like a life-threatening emergency to the triager   Negative: SEVERE or constant chest pain or pressure  (Exception: Mild central chest pain, present only when coughing.)   Negative: MODERATE difficulty breathing (e.g., speaks in phrases, SOB even at rest, pulse 100-120)   Negative: Headache and stiff neck (can't touch chin to chest)   Negative: Oxygen level (e.g., pulse oximetry) 90% or lower   Negative: Chest pain or pressure  (Exception: MILD central chest pain, present only when coughing.)   Negative: Drinking very little and dehydration suspected (e.g., no urine > 12 hours, very dry mouth, very lightheaded)   Negative: Patient sounds very sick or weak to the triager   Negative: MILD difficulty breathing (e.g., minimal/no SOB at rest, SOB with walking, pulse <100)   Negative: Fever > 103 F (39.4 C)   Negative: Fever > 101 F (38.3 C) and over 60 years of age   Negative: Fever > 100.0 F (37.8 C) and bedridden (e.g., CVA, chronic illness, recovering from surgery)   Negative: HIGH RISK patient (e.g., weak immune system, age > 64 years, obesity with BMI of 30 or higher, pregnant, chronic lung disease or other chronic medical condition) and COVID symptoms (e.g., cough, fever)  (Exceptions: Already seen by doctor or NP/PA and no new or worsening  symptoms.)   Negative: HIGH RISK patient and influenza is widespread in the community and ONE OR MORE respiratory symptoms: cough, sore throat, runny or stuffy nose   Negative: HIGH RISK patient and influenza exposure within the last 7 days and ONE OR MORE respiratory symptoms: cough, sore throat, runny or stuffy nose   Negative: Oxygen level (e.g., pulse oximetry) 91 to 94%   Negative: COVID-19 infection suspected by caller or triager and mild symptoms (cough, fever, or others) and negative COVID-19 rapid test   Negative: Fever present > 3 days (72 hours)   Negative: Fever returns after gone for over 24 hours and symptoms worse or not improved   Negative: Continuous (nonstop) coughing interferes with work or school and no improvement using cough treatment per Care Advice   Negative: Cough present > 3 weeks   Negative: COVID-19 diagnosed by positive lab test (e.g., PCR, rapid self-test kit) and NO symptoms (e.g., cough, fever, others)    Protocols used: Coronavirus (COVID-19) Diagnosed or [id number]

## 2024-07-12 NOTE — TELEPHONE ENCOUNTER
RECORDS STATUS - ALL OTHER DIAGNOSIS      RECORDS RECEIVED FROM: [facility]   NOTES STATUS DETAILS   OFFICE NOTE from referring provider Epic 05/15/24: Dr. Felicia Vásquez   MEDICATION LIST [facility]    LABS     PATHOLOGY REPORTS     ANYTHING RELATED TO DIAGNOSIS Epic Most recent 05/24/24

## 2024-07-25 ENCOUNTER — LAB (OUTPATIENT)
Dept: LAB | Facility: CLINIC | Age: 64
End: 2024-07-25
Attending: INTERNAL MEDICINE
Payer: COMMERCIAL

## 2024-07-25 ENCOUNTER — ONCOLOGY VISIT (OUTPATIENT)
Dept: ONCOLOGY | Facility: CLINIC | Age: 64
End: 2024-07-25
Attending: INTERNAL MEDICINE
Payer: COMMERCIAL

## 2024-07-25 ENCOUNTER — PRE VISIT (OUTPATIENT)
Dept: ONCOLOGY | Facility: CLINIC | Age: 64
End: 2024-07-25
Payer: COMMERCIAL

## 2024-07-25 VITALS
OXYGEN SATURATION: 98 % | SYSTOLIC BLOOD PRESSURE: 137 MMHG | HEART RATE: 72 BPM | HEIGHT: 75 IN | RESPIRATION RATE: 16 BRPM | BODY MASS INDEX: 23.75 KG/M2 | TEMPERATURE: 97.8 F | WEIGHT: 191 LBS | DIASTOLIC BLOOD PRESSURE: 79 MMHG

## 2024-07-25 DIAGNOSIS — D69.6 THROMBOCYTOPENIA (H): ICD-10-CM

## 2024-07-25 LAB
BASOPHILS # BLD AUTO: 0 10E3/UL (ref 0–0.2)
BASOPHILS NFR BLD AUTO: 1 %
EOSINOPHIL # BLD AUTO: 0.1 10E3/UL (ref 0–0.7)
EOSINOPHIL NFR BLD AUTO: 3 %
ERYTHROCYTE [DISTWIDTH] IN BLOOD BY AUTOMATED COUNT: 12.6 % (ref 10–15)
HBV CORE AB SERPL QL IA: NONREACTIVE
HCT VFR BLD AUTO: 43.5 % (ref 40–53)
HCV AB SERPL QL IA: NONREACTIVE
HGB BLD-MCNC: 15.2 G/DL (ref 13.3–17.7)
HIV 1+2 AB+HIV1 P24 AG SERPL QL IA: NONREACTIVE
IMM GRANULOCYTES # BLD: 0 10E3/UL
IMM GRANULOCYTES NFR BLD: 0 %
LYMPHOCYTES # BLD AUTO: 1.1 10E3/UL (ref 0.8–5.3)
LYMPHOCYTES NFR BLD AUTO: 30 %
MCH RBC QN AUTO: 32.8 PG (ref 26.5–33)
MCHC RBC AUTO-ENTMCNC: 34.9 G/DL (ref 31.5–36.5)
MCV RBC AUTO: 94 FL (ref 78–100)
MONOCYTES # BLD AUTO: 0.3 10E3/UL (ref 0–1.3)
MONOCYTES NFR BLD AUTO: 9 %
NEUTROPHILS # BLD AUTO: 2 10E3/UL (ref 1.6–8.3)
NEUTROPHILS NFR BLD AUTO: 57 %
NRBC # BLD AUTO: 0 10E3/UL
NRBC BLD AUTO-RTO: 0 /100
PLATELET # BLD AUTO: 115 10E3/UL (ref 150–450)
RBC # BLD AUTO: 4.64 10E6/UL (ref 4.4–5.9)
RETICS # AUTO: 0.04 10E6/UL (ref 0.03–0.1)
RETICS/RBC NFR AUTO: 1 % (ref 0.5–2)
VIT B12 SERPL-MCNC: 559 PG/ML (ref 232–1245)
WBC # BLD AUTO: 3.6 10E3/UL (ref 4–11)

## 2024-07-25 PROCEDURE — 86704 HEP B CORE ANTIBODY TOTAL: CPT

## 2024-07-25 PROCEDURE — 86803 HEPATITIS C AB TEST: CPT

## 2024-07-25 PROCEDURE — 36415 COLL VENOUS BLD VENIPUNCTURE: CPT

## 2024-07-25 PROCEDURE — 85048 AUTOMATED LEUKOCYTE COUNT: CPT

## 2024-07-25 PROCEDURE — 85045 AUTOMATED RETICULOCYTE COUNT: CPT

## 2024-07-25 PROCEDURE — 99207 BLOOD MORPHOLOGY PATHOLOGIST REVIEW: CPT | Performed by: STUDENT IN AN ORGANIZED HEALTH CARE EDUCATION/TRAINING PROGRAM

## 2024-07-25 PROCEDURE — 99213 OFFICE O/P EST LOW 20 MIN: CPT | Performed by: INTERNAL MEDICINE

## 2024-07-25 PROCEDURE — 99204 OFFICE O/P NEW MOD 45 MIN: CPT | Performed by: INTERNAL MEDICINE

## 2024-07-25 PROCEDURE — 82607 VITAMIN B-12: CPT

## 2024-07-25 PROCEDURE — 87389 HIV-1 AG W/HIV-1&-2 AB AG IA: CPT

## 2024-07-25 ASSESSMENT — PAIN SCALES - GENERAL: PAINLEVEL: NO PAIN (0)

## 2024-07-25 NOTE — PROGRESS NOTES
"Hematology Clinic consult note    Reasons for Consult: -Thrombocytopenia    History of Present Illness: Mr. Moon is a 64-year-old man referred for thrombocytopenia.  He has been noted to have mildly low platelets since 2022, ranging from 117-143 K.  Normal hemoglobin and on 1 occasion a slightly low total white count (no differential was done).  No documented neutropenia.  With his most recent CBC, he had an infected sebaceous cyst on the day the blood was drawn, treated with doxycycline.    He reports that he had a mild case of COVID about a month ago, mainly sinus congestion, completely recovered.  He overall feels well and exercises 4-5 times a week with weights and bicycling.  He has no bleeding symptoms-no epistaxis, melena, hematochezia, hematuria, bruising.    Past Medical History:  - Atrial fibrillation with rapid ventricular response 6/25/2022  - Status post hernia repair- 20 years ago  - History of varicose veins, status post ligation  - Low back pain  - History of STD  -History of infected sebaceous cyst 5/14/2024  -COVID infection success 5/24    Medications:  - Aspirin 81 mg daily  -Metoprolol XL 50 mg daily  - Multivitamin  -Magnesium supplement  - Protein powder supplement-vegetable protein, \"natures bounty\".   He is not take any herbal supplements or other over-the-counter medications.    Family History: mother-Alzheimer, bipolar, coronary artery disease., father-diabetes, hypertension, prostate cancer, coronary artery disease.  Siblings-brother with prostate cancer age 55, atrial fibrillation,     Social History: smoking-never,  alcohol-he was drinking 5-7 drinks a week, but stopped when he started having problems with atrial fibrillation.  Has had no alcohol for about 4 months.  He is working, sales.    Review of systems:  As in HPI.  He is being monitored annually for prostate cancer given his father and brother of prostate cancer.  He also sees a dermatologist annually.  He has bilateral hip " "pain, left greater than right, and will be having hip MRI.  Intermittent low back pain.  He has occasional the rest of the > 10 point review of systems was negative.      PHYSICAL EXAMINATION:  /79 (BP Location: Right arm, Patient Position: Sitting, Cuff Size: Adult Regular)   Pulse 72   Temp 97.8  F (36.6  C) (Oral)   Resp 16   Ht 1.9 m (6' 2.8\")   Wt 86.6 kg (191 lb)   SpO2 98%   BMI 24.00 kg/m      General appearance:  Patient is 64 year old man in no acute distress.     HEENT:  No pallor, icterus, or mucositis.    Lymph nodes:  No cervical, supraclavicular, axillary, or inguinal lymphadenopathy.   Lungs:  Clear to auscultation bilaterally.   Heart:  Regular rate and rhythm; no S3 S4 or murmer.     Abdomen:  Positive bowel sounds, soft and nontender, nondistended.  No hepatomegaly. No splenomegaly appreciated.    Extremities:  No joint swelling or tenderness.  No ankle edema.     Skin:  No rash, no petechiae or ecchymoses.      Labs:  Blood smear pending   Latest Reference Range & Units 05/14/24 14:20 05/15/24 10:04 05/24/24 13:53 07/25/24 08:35   WBC 4.0 - 11.0 10e3/uL 5.0 3.8 (L) 4.7 3.6 (L)   Hemoglobin 13.3 - 17.7 g/dL 14.5 15.9 15.8 15.2   Hematocrit 40.0 - 53.0 % 43.7 48.3 47.1 43.5   Platelet Count 150 - 450 10e3/uL 117 (L) 140 (L) 140 (L) 115 (L)   RBC Count 4.40 - 5.90 10e6/uL 4.59 5.02 4.99 4.64   MCV 78 - 100 fL 95 96 94 94   MCH 26.5 - 33.0 pg 31.6 31.7 31.7 32.8   MCHC 31.5 - 36.5 g/dL 33.2 32.9 33.5 34.9   RDW 10.0 - 15.0 % 12.4 12.4 12.2 12.6   % Neutrophils % 72   57   % Lymphocytes % 20   30   % Monocytes % 6   9   % Eosinophils % 2   3   % Basophils % 0   1   Absolute Basophils 0.0 - 0.2 10e3/uL 0.0   0.0   Absolute Eosinophils 0.0 - 0.7 10e3/uL 0.1   0.1   Absolute Immature Granulocytes <=0.4 10e3/uL 0.0   0.0   Absolute Lymphocytes 0.8 - 5.3 10e3/uL 1.0   1.1   Absolute Monocytes 0.0 - 1.3 10e3/uL 0.3   0.3   % Immature Granulocytes % 0   0   Absolute Neutrophils 1.6 - 8.3 10e3/uL " 3.6   2.0   Absolute NRBCs 10e3/uL    0.0   NRBCs per 100 WBC <1 /100    0   % Retic 0.5 - 2.0 %    1.0   Absolute Retic 0.025 - 0.095 10e6/uL    0.045   (L): Data is abnormally low   Latest Reference Range & Units 07/25/24 08:35   Vitamin B12 232 - 1,245 pg/mL 559      Latest Reference Range & Units 06/25/22 13:51   TSH 0.40 - 4.00 mU/L 1.59      Latest Reference Range & Units 07/25/24 08:35   Hepatitis B Core Paty Nonreactive  Nonreactive   Hepatitis C Antibody Nonreactive  Nonreactive   HIV Antigen Antibody Combo Nonreactive  Nonreactive     Assessment and Recommendation: -    # Isolated mild thrombocytopenia-this has been present for several years, waxing and waning, but never <100k.  He does not have B12 deficiency or hypothyroidism.  Does not have hepatitis B, hepatitis C, or HIV infection to account for this.  It could be a pseudothrombocytopenia, with his platelets clumping in the test tube, however no comment was made about this on the CBC.  He may have a mild autoimmune thrombocytopenia.  Alcohol can suppress platelet count and blood counts in general, but his description of his drinking was not that heavy and he is completely quit, so unlikely to be the cause.  His labs do not give any indication of liver dysfunction on repeated testing.  He is unlikely to have a serious primary bone marrow disorder, since there is not a constant downward trend in the platelet count and the hemoglobin and white cell subsets are normal.  On a couple of occasions his total white count has been slightly low, but the subsets at to normal, so not concerned about this.    Before we had today's results back, I discussed with him that we might not find an answer for the mildly low platelet count.  He may just have a lower set point then the usual reference range.  As long as his platelet count is greater than 100K, he is really not any risk for bleeding and could have major surgery.  If the platelets drop down to less than 50 K  then I start worrying about bleeding symptoms, especially because he is on aspirin for atrial fibrillation.    I will be in touch with him regarding results, and unless there are concerning findings on the blood smear, he will not need routine follow-up in hematology clinic or routine blood counts.  Check blood counts only if there is illness indicating a need for a complete blood count.    Time: I spent a total of 50 minutes on the day of the visit. Please see the note for further information on patient assessment and treatment.     Cindy Arambula MD  Hematology

## 2024-07-25 NOTE — NURSING NOTE
Chief Complaint   Patient presents with    Blood Draw    Labs Only     Labs drawn via  by RN.     Labs collected from venipuncture by RN.     Katiana Casillas RN

## 2024-07-25 NOTE — NURSING NOTE
"Oncology Rooming Note    July 25, 2024 6:59 AM   Gabe Moon is a 64 year old male who presents for:    Chief Complaint   Patient presents with    Oncology Clinic Visit     Thrombocytopenia      Initial Vitals: BP (!) 150/85   Pulse 72   Resp 16   Ht 1.9 m (6' 2.8\")   Wt 86.6 kg (191 lb)   SpO2 98%   BMI 24.00 kg/m   Estimated body mass index is 24 kg/m  as calculated from the following:    Height as of this encounter: 1.9 m (6' 2.8\").    Weight as of this encounter: 86.6 kg (191 lb). Body surface area is 2.14 meters squared.  No Pain (0) Comment: Data Unavailable   No LMP for male patient.  Allergies reviewed: Yes  Medications reviewed: Yes    Medications: Medication refills not needed today.  Pharmacy name entered into EPIC:    [facility] [id number] [place] [facility] [address] AT [id number] - [place], [place] - [address] [facility] [address] [id number] [place], [place] - [address] [facility] [address]  [id number] [place], [place] - [address] [facility] [address] [address]    Frailty Screening:   Is the patient here for a new oncology consult visit in cancer care? 1. Yes. Over the past month, have you experienced difficulty or required a caregiver to assist with:   1. Balance, walking or general mobility (including any falls)? NO  2. Completion of self-care tasks such as bathing, dressing, toileting, grooming/hygiene?  NO  3. Concentration or memory that affects your daily life?  NO       Clinical concerns:         Lillian Cannon CMA            "

## 2024-07-25 NOTE — LETTER
"7/25/2024      Gabe Moon  [address] [facility] [id number]      Dear Colleague,    Thank you for referring your patient, Gabe Moon, to the [facility]. Please see a copy of my visit note below.    Hematology Clinic consult note    Reasons for Consult: -Thrombocytopenia    History of Present Illness: Mr. Moon is a 64-year-old man referred for thrombocytopenia.  He has been noted to have mildly low platelets since 2022, ranging from 117-143 K.  Normal hemoglobin and on 1 occasion a slightly low total white count (no differential was done).  No documented neutropenia.  With his most recent CBC, he had an infected sebaceous cyst on the day the blood was drawn, treated with doxycycline.    He reports that he had a mild case of COVID about a month ago, mainly sinus congestion, completely recovered.  He overall feels well and exercises 4-5 times a week with weights and bicycling.  He has no bleeding symptoms-no epistaxis, melena, hematochezia, hematuria, bruising.    Past Medical History:  - Atrial fibrillation with rapid ventricular response 6/25/2022  - Status post hernia repair- 20 years ago  - History of varicose veins, status post ligation  - Low back pain  - History of STD  -History of infected sebaceous cyst 5/14/2024  -COVID infection success 5/24    Medications:  - Aspirin 81 mg daily  -Metoprolol XL 50 mg daily  - Multivitamin  -Magnesium supplement  - Protein powder supplement-vegetable protein, \"natures bounty\".   He is not take any herbal supplements or other over-the-counter medications.    Family History: mother-Alzheimer, bipolar, coronary artery disease., father-diabetes, hypertension, prostate cancer, coronary artery disease.  Siblings-brother with prostate cancer age 55, atrial fibrillation,     Social History: smoking-never,  alcohol-he was drinking 5-7 drinks a week, but stopped when he started having problems with atrial fibrillation.  Has " "had no alcohol for about 4 months.  He is working, sales.    Review of systems:  As in HPI.  He is being monitored annually for prostate cancer given his father and brother of prostate cancer.  He also sees a dermatologist annually.  He has bilateral hip pain, left greater than right, and will be having hip MRI.  Intermittent low back pain.  He has occasional the rest of the > 10 point review of systems was negative.      PHYSICAL EXAMINATION:  /79 (BP Location: Right arm, Patient Position: Sitting, Cuff Size: Adult Regular)   Pulse 72   Temp 97.8  F (36.6  C) (Oral)   Resp 16   Ht 1.9 m (6' 2.8\")   Wt 86.6 kg (191 lb)   SpO2 98%   BMI 24.00 kg/m      General appearance:  Patient is 64 year old man in no acute distress.     HEENT:  No pallor, icterus, or mucositis.    Lymph nodes:  No cervical, supraclavicular, axillary, or inguinal lymphadenopathy.   Lungs:  Clear to auscultation bilaterally.   Heart:  Regular rate and rhythm; no S3 S4 or murmer.     Abdomen:  Positive bowel sounds, soft and nontender, nondistended.  No hepatomegaly. No splenomegaly appreciated.    Extremities:  No joint swelling or tenderness.  No ankle edema.     Skin:  No rash, no petechiae or ecchymoses.      Labs:  Blood smear pending   Latest Reference Range & Units 05/14/24 14:20 05/15/24 10:04 05/24/24 13:53 07/25/24 08:35   WBC 4.0 - 11.0 10e3/uL 5.0 3.8 (L) 4.7 3.6 (L)   Hemoglobin 13.3 - 17.7 g/dL 14.5 15.9 15.8 15.2   Hematocrit 40.0 - 53.0 % 43.7 48.3 47.1 43.5   Platelet Count 150 - 450 10e3/uL 117 (L) 140 (L) 140 (L) 115 (L)   RBC Count 4.40 - 5.90 10e6/uL 4.59 5.02 4.99 4.64   MCV 78 - 100 fL 95 96 94 94   MCH 26.5 - 33.0 pg 31.6 31.7 31.7 32.8   MCHC 31.5 - 36.5 g/dL 33.2 32.9 33.5 34.9   RDW 10.0 - 15.0 % 12.4 12.4 12.2 12.6   % Neutrophils % 72   57   % Lymphocytes % 20   30   % Monocytes % 6   9   % Eosinophils % 2   3   % Basophils % 0   1   Absolute Basophils 0.0 - 0.2 10e3/uL 0.0   0.0   Absolute Eosinophils 0.0 " - 0.7 10e3/uL 0.1   0.1   Absolute Immature Granulocytes <=0.4 10e3/uL 0.0   0.0   Absolute Lymphocytes 0.8 - 5.3 10e3/uL 1.0   1.1   Absolute Monocytes 0.0 - 1.3 10e3/uL 0.3   0.3   % Immature Granulocytes % 0   0   Absolute Neutrophils 1.6 - 8.3 10e3/uL 3.6   2.0   Absolute NRBCs 10e3/uL    0.0   NRBCs per 100 WBC <1 /100    0   % Retic 0.5 - 2.0 %    1.0   Absolute Retic 0.025 - 0.095 10e6/uL    0.045   (L): Data is abnormally low   Latest Reference Range & Units 07/25/24 08:35   Vitamin B12 232 - 1,245 pg/mL 559      Latest Reference Range & Units 06/25/22 13:51   TSH 0.40 - 4.00 mU/L 1.59      Latest Reference Range & Units 07/25/24 08:35   Hepatitis B [place] Nonreactive  Nonreactive   Hepatitis C Antibody Nonreactive  Nonreactive   HIV Antigen Antibody Combo Nonreactive  Nonreactive     Assessment and Recommendation: -    # Isolated mild thrombocytopenia-this has been present for several years, waxing and waning, but never <100k.  He does not have B12 deficiency or hypothyroidism.  Does not have hepatitis B, hepatitis C, or HIV infection to account for this.  It could be a pseudothrombocytopenia, with his platelets clumping in the test tube, however no comment was made about this on the CBC.  He may have a mild autoimmune thrombocytopenia.  Alcohol can suppress platelet count and blood counts in general, but his description of his drinking was not that heavy and he is completely quit, so unlikely to be the cause.  His labs do not give any indication of liver dysfunction on repeated testing.  He is unlikely to have a serious primary bone marrow disorder, since there is not a constant downward trend in the platelet count and the hemoglobin and white cell subsets are normal.  On a couple of occasions his total white count has been slightly low, but the subsets at to normal, so not concerned about this.    Before we had today's results back, I discussed with him that we might not find an answer for the mildly  low platelet count.  He may just have a lower set point then the usual reference range.  As long as his platelet count is greater than 100K, he is really not any risk for bleeding and could have major surgery.  If the platelets drop down to less than 50 K then I start worrying about bleeding symptoms, especially because he is on aspirin for atrial fibrillation.    I will be in touch with him regarding results, and unless there are concerning findings on the blood smear, he will not need routine follow-up in hematology clinic or routine blood counts.  Check blood counts only if there is illness indicating a need for a complete blood count.    Time: I spent a total of 50 minutes on the day of the visit. Please see the note for further information on patient assessment and treatment.     Cindy Arambula MD  Hematology      Again, thank you for allowing me to participate in the care of your patient.        Sincerely,        Cindy Arambula MD

## 2024-07-26 LAB
PATH REPORT.COMMENTS IMP SPEC: NORMAL
PATH REPORT.COMMENTS IMP SPEC: NORMAL
PATH REPORT.FINAL DX SPEC: NORMAL
PATH REPORT.MICROSCOPIC SPEC OTHER STN: NORMAL
PATH REPORT.MICROSCOPIC SPEC OTHER STN: NORMAL
PATH REPORT.RELEVANT HX SPEC: NORMAL

## 2024-07-29 NOTE — RESULT ENCOUNTER NOTE
Hello -    Here are my comments about the recent results: The platelets are still mildly low, but no increased risk of bleeding as long as they are >100 x 10e3/uL.  Good news that the blood smear looks normal, and all the other tests are normal.  It may be that you just have a slightly lower set point for platelets than the average person.  I am not concerned about this, and you do not need routine follow-up in hematology clinic and do not need routine blood count checks.    Regards,   Cindy Arambula MD

## 2025-01-09 ENCOUNTER — OFFICE VISIT (OUTPATIENT)
Dept: DERMATOLOGY | Facility: CLINIC | Age: 65
End: 2025-01-09
Payer: COMMERCIAL

## 2025-01-09 DIAGNOSIS — L82.1 SK (SEBORRHEIC KERATOSIS): ICD-10-CM

## 2025-01-09 DIAGNOSIS — L81.4 LENTIGINES: ICD-10-CM

## 2025-01-09 DIAGNOSIS — D18.01 CHERRY ANGIOMA: ICD-10-CM

## 2025-01-09 DIAGNOSIS — D22.9 MULTIPLE BENIGN NEVI: Primary | ICD-10-CM

## 2025-01-09 DIAGNOSIS — L82.0 INFLAMED SEBORRHEIC KERATOSIS: ICD-10-CM

## 2025-01-09 RX ORDER — COVID-19 ANTIGEN TEST
440 KIT MISCELLANEOUS DAILY PRN
COMMUNITY
Start: 2024-10-01

## 2025-01-09 NOTE — PATIENT INSTRUCTIONS
Proper skin care from [facility]:    -Eliminate harsh soaps as they strip the natural oils from the skin, often resulting in dry itchy skin ( i.e. Dial, Zest, Cymraes Spring)  -Use mild soaps such as Cetaphil or Dove Sensitive Skin in the shower. You do not need to use soap on arms, legs, and trunk every time you shower unless visibly soiled.   -Avoid hot or cold showers.  -After showering, lightly dry off and apply moisturizing within 2-3 minutes. This will help trap moisture in the skin.   -Aggressive use of a moisturizer at least 1-2 times a day to the entire body (including -Vanicream, Cetaphil, Aquaphor or Cerave) and moisturize hands after every washing.  -We recommend using moisturizers that come in a tub that needs to be scooped out, not a pump. This has more of an oil base. It will hold moisture in your skin much better than a water base moisturizer. The above recommended are non-pore clogging.      Wear a sunscreen with at least SPF 30 on your face, ears, neck and V of the chest daily. Wear sunscreen on other areas of the body if those areas are exposed to the sun throughout the day. Sunscreens can contain physical and/or chemical blockers. Physical blockers are less likely to clog pores, these include zinc oxide and titanium dioxide. Reapply every two hour and after swimming.     Sunscreen examples: https://www.ewg.org/sunscreen/    UV radiation  UVA radiation remains constant throughout the day and throughout the year. It is a longer wavelength than UVB and therefore penetrates deeper into the skin leading to immediate and delayed tanning, photoaging, and skin cancer. 70-80% of UVA and UVB radiation occurs between the hours of 10am-2pm.  UVB radiation  UVB radiation causes the most harmful effects and is more significant during the summer months. However, snow and ice can reflect UVB radiation leading to skin damage during the winter months as well. UVB radiation is responsible for tanning,  burning, inflammation, delayed erythema (pinkness), pigmentation (brown spots), and skin cancer.     I recommend self monthly full body exams and yearly full body exams with a dermatology provider. If you develop a new or changing lesion please follow up for examination. Most skin cancers are pink and scaly or pink and pearly. However, we do see blue/brown/black skin cancers.  Consider the ABCDEs of melanoma when giving yourself your monthly full body exam ( don't forget the groin, buttocks, feet, toes, etc). A-asymmetry, B-borders, C-color, D-diameter, E-elevation or evolving. If you see any of these changes please follow up in clinic. If you cannot see your back I recommend purchasing a hand held mirror to use with a larger wall mirror.       Checking for Skin Cancer  You can find cancer early by checking your skin each month. There are 3 kinds of skin cancer. They are melanoma, basal cell carcinoma, and squamous cell carcinoma. Doing monthly skin checks is the best way to find new marks or skin changes. Follow the instructions below for checking your skin.   The ABCDEs of checking moles for melanoma   Check your moles or growths for signs of melanoma using ABCDE:   Asymmetry: the sides of the mole or growth don t match  Border: the edges are ragged, notched, or blurred  Color: the color within the mole or growth varies  Diameter: the mole or growth is larger than 6 mm (size of a pencil eraser)  Evolving: the size, shape, or color of the mole or growth is changing (evolving is not shown in the images below)    Checking for other types of skin cancer  Basal cell carcinoma or squamous cell carcinoma have symptoms such as:     A spot or mole that looks different from all other marks on your skin  Changes in how an area feels, such as itching, tenderness, or pain  Changes in the skin's surface, such as oozing, bleeding, or scaliness  A sore that does not heal  New swelling or redness beyond the border of a  mole    Who s at risk?  Anyone can get skin cancer. But you are at greater risk if you have:   Fair skin, light-colored hair, or light-colored eyes  Many moles or abnormal moles on your skin  A history of sunburns from sunlight or tanning beds  A family history of skin cancer  A history of exposure to radiation or chemicals  A weakened immune system  If you have had skin cancer in the past, you are at risk for recurring skin cancer.   How to check your skin  Do your monthly skin checkups in front of a full-length mirror. Check all parts of your body, including your:   Head (ears, face, neck, and scalp)  Torso (front, back, and sides)  Arms (tops, undersides, upper, and lower armpits)  Hands (palms, backs, and fingers, including under the nails)  Buttocks and genitals  Legs (front, back, and sides)  Feet (tops, soles, toes, including under the nails, and between toes)  If you have a lot of moles, take digital photos of them each month. Make sure to take photos both up close and from a distance. These can help you see if any moles change over time.   Most skin changes are not cancer. But if you see any changes in your skin, call your doctor right away. Only he or she can diagnose a problem. If you have skin cancer, seeing your doctor can be the first step toward getting the treatment that could save your life.   [employer] last reviewed this educational content on 4/1/2019 2000-2020 [employer] [address] [place] [address], [place] [address]. All rights reserved. This information is not intended as a substitute for professional medical care. Always follow your healthcare professional's instructions.       When should I call my doctor?  If you are worsening or not improving, please, contact us or seek urgent care as noted below.     Who should I call with questions (adults)?    [facility] [phone]  For urgent needs outside of business hours call the [facility] at  [phone] and ask for the dermatology resident on call to be paged  If this is a medical emergency and you are unable to reach an ER, Call 911      If you need a prescription refill, please contact your pharmacy. Refills are approved or denied by our Physicians during normal business hours, Monday through Fridays    Cryotherapy    What is it?  Use of a very cold liquid, such as liquid nitrogen, to freeze and destroy abnormal skin cells that need to be removed    What should I expect?  Tenderness and redness  A small blister that might grow and fill with dark purple blood. There may be crusting.  More than one treatment may be needed if the lesions do not go away.    How do I care for the treated area?  Gently wash the area with your hands when bathing.  Use a thin layer of Vaseline to help with healing. You may use a Band-Aid.   The area should heal within 7-10 days and may leave behind a pink or lighter color.   Do not use an antibiotic or Neosporin ointment.   You may take acetaminophen (Tylenol) for pain.     Call your Doctor if you have:  Severe pain  Signs of infection (warmth, redness, cloudy yellow drainage, and or a bad smell)  Questions or concerns    Who should I call with questions?      [facility]: [phone]      [facility]: [phone]      For urgent needs outside of business hours call the [facility] at [phone]        and ask for the dermatology resident on call    Per office policy, refills will not be granted if you have not been seen within the past year (or sooner depending on your child's condition)

## 2025-01-09 NOTE — PROGRESS NOTES
"[facility] Dermatology Note  Encounter Date: Jan 9, 2025  Office Visit      Dermatology Problem List:  FBSC performed on 1/9/25    iSK S/p cryo     FMHx: Brother recently had \"precancer\" lesions excised, unknown diagnoses put patient says not melanoma  ____________________________________________    Assessment & Plan:    # iSK x 10   - Cryotherapy performed today, see procedure note below.  - Recommended exfoliated often if they become itchy,     # Benign findings: multiple benign nevi, lentigines, cherry angiomas, SKs  - edu on benign etiology  - Signs and Symptoms of non-melanoma skin cancer and ABCDEs of melanoma reviewed with patient. Patient encouraged to perform monthly self skin exams and educated on how to perform them. UV precautions reviewed with patient. Patient was asked about new or changing moles/lesions on body.   - Sunscreen: Apply 20 minutes prior to going outdoors and reapply every two hours, when wet or sweating. We recommend using an SPF 30 or higher, and to use one that is water resistant.     - RTC for changes     Procedures Performed:   CRYOTHERAPY PROCEDURE NOTE: 10 lesions in the above locations were treated with liquid nitrogen utilizing a 5-10sec thaw time. Patient was advised that the treated areas will become red, swollen, may develop a blister and then should crust and peal off in the next 1-2 weeks. Post-procedure instructions were provided.    Follow-up: prn for new or changing lesions    Staff and scribe     Scribe Disclosure:   I, SHILOH SO, am serving as a scribe; to document services personally performed by Jennifer Pablo PA-C -based on data collection and the provider's statements to me.     Provider Disclosure:  I agree with above History, Review of Systems, Physical exam and Plan.  I have reviewed the content of the documentation and have edited it as needed. I have personally performed the services documented here and the documentation accurately " "represents those services and the decisions I have made.      Electronically signed by:    All risks, benefits and alternatives were discussed with patient.  Patient is in agreement and understands the assessment and plan.  All questions were answered.    Jennifer Pablo PA-C, MPAS  [facility]: Phone: [phone], Fax: [phone]  [facility]: Phone: [phone],  Fax: [phone]  [facility]: Phone: [phone], Fax: [phone]  ____________________________________________    CC: Skin Check (FBSC/Concerns scalp lesion/Age spots on arms/Revisit right thigh)      Reviewed patients past medical history and pertinent chart review prior to patient's visit today.     HPI:  Mr. Gabe Moon is a 64 year old male who presents today as a return patient for FBSE. Last seen on 12/4/24 for  a spot check where an iSK was found behind his R ear and treated with cryotherapy.     Today patient reported that he has a spot of concern on his scalp.     He also noted a few \"age\" spots on his arms.     Patient is otherwise feeling well, without additional concerns.    Labs:  N/A    Physical Exam:  Vitals: There were no vitals taken for this visit.  SKIN: Total skin excluding the undergarment areas was performed. The exam included the head/face, neck, both arms, chest, back, abdomen, both legs, digits and/or nails.   - Fletcher's skin type II, has <100 nevi  - There are dome shaped bright red papules on the trunk.   - Multiple regular brown pigmented macules and papules are identified on the trunk and extremities.   - Scattered brown macules on sun exposed areas.  - There are waxy stuck on tan to brown papules on the trunk  There is a tan to brown waxy stuck on papule with surrounding erythema on the:  x 1 R eyebrow, x 3 R tricep, x 1 L forearm, x 2 R scalp, x 1 mid frontal scalp, x  1 R posterior auricular, x 1 " R thigh   - No other lesions of concern on areas examined.     Medications:  Current Outpatient Medications   Medication Sig Dispense Refill    Aspirin 81 MG CAPS       metoprolol succinate ER (TOPROL XL) 50 MG 24 hr tablet Take 1 tablet (50 mg) by mouth every evening 90 tablet 3    multivitamin, therapeutic with minerals (MULTI-VITAMIN) TABS tablet Take 1 tablet by mouth daily 100 tablet 0    naproxen sodium (ALEVE) 220 MG capsule Take 440 mg by mouth daily as needed (hip pain).       No current facility-administered medications for this visit.      Past Medical/Surgical History:   Patient Active Problem List   Diagnosis    Family history of prostate cancer    Atrial fibrillation with rapid ventricular response (H)    Seborrheic keratoses    Family history of diabetes mellitus     Past Medical History:   Diagnosis Date    Atrial fibrillation with rapid ventricular response (H) 6/25/2022    Blood in semen     Inguinal hernia of right side with gangrene 2003    Low back pain     Prostate infection     STD (sexually transmitted disease)     Varicose vein of leg

## 2025-01-09 NOTE — LETTER
"1/9/2025      Gabe Moon  [address] [address]      Dear Colleague,    Thank you for referring your patient, Gabe Moon, to the [facility] [place]. Please see a copy of my visit note below.    [facility] Dermatology Note  Encounter Date: Jan 9, 2025  Office Visit      Dermatology Problem List:  FBSC performed on 1/9/25    iSK S/p cryo     FMHx: Brother recently had \"precancer\" lesions excised, unknown diagnoses put patient says not melanoma  ____________________________________________    Assessment & Plan:    # iSK x 10   - Cryotherapy performed today, see procedure note below.  - Recommended exfoliated often if they become itchy,     # Benign findings: multiple benign nevi, lentigines, cherry angiomas, SKs  - edu on benign etiology  - Signs and Symptoms of non-melanoma skin cancer and ABCDEs of melanoma reviewed with patient. Patient encouraged to perform monthly self skin exams and educated on how to perform them. UV precautions reviewed with patient. Patient was asked about new or changing moles/lesions on body.   - Sunscreen: Apply 20 minutes prior to going outdoors and reapply every two hours, when wet or sweating. We recommend using an SPF 30 or higher, and to use one that is water resistant.     - RTC for changes     Procedures Performed:   CRYOTHERAPY PROCEDURE NOTE: 10 lesions in the above locations were treated with liquid nitrogen utilizing a 5-10sec thaw time. Patient was advised that the treated areas will become red, swollen, may develop a blister and then should crust and peal off in the next 1-2 weeks. Post-procedure instructions were provided.    Follow-up: prn for new or changing lesions    Staff and scribe     Scribe Disclosure:   I, SHILOH SO, am serving as a scribe; to document services personally performed by Jennifer Pablo PA-C -based on data collection and the provider's statements to me.     Provider " "Disclosure:  I agree with above History, Review of Systems, Physical exam and Plan.  I have reviewed the content of the documentation and have edited it as needed. I have personally performed the services documented here and the documentation accurately represents those services and the decisions I have made.      Electronically signed by:    All risks, benefits and alternatives were discussed with patient.  Patient is in agreement and understands the assessment and plan.  All questions were answered.    Jennifer Pablo PA-C, MPAS  [facility]: Phone: [phone], Fax: [phone]  [facility]: Phone: [phone],  Fax: [phone]  [facility]: Phone: [phone], Fax: [phone]  ____________________________________________    CC: Skin Check (FBSC/Concerns scalp lesion/Age spots on arms/Revisit right thigh)      Reviewed patients past medical history and pertinent chart review prior to patient's visit today.     HPI:  Mr. Gabe Moon is a 64 year old male who presents today as a return patient for FBSE. Last seen on 12/4/24 for  a spot check where an iSK was found behind his R ear and treated with cryotherapy.     Today patient reported that he has a spot of concern on his scalp.     He also noted a few \"age\" spots on his arms.     Patient is otherwise feeling well, without additional concerns.    Labs:  N/A    Physical Exam:  Vitals: There were no vitals taken for this visit.  SKIN: Total skin excluding the undergarment areas was performed. The exam included the head/face, neck, both arms, chest, back, abdomen, both legs, digits and/or nails.   - Fletcher's skin type II, has <100 nevi  - There are dome shaped bright red papules on the trunk.   - Multiple regular brown pigmented macules and papules are identified on the trunk and extremities.   - Scattered brown macules on sun exposed " areas.  - There are waxy stuck on tan to brown papules on the trunk  There is a tan to brown waxy stuck on papule with surrounding erythema on the:  x 1 R eyebrow, x 3 R tricep, x 1 L forearm, x 2 R scalp, x 1 mid frontal scalp, x  1 R posterior auricular, x 1 R thigh   - No other lesions of concern on areas examined.     Medications:  Current Outpatient Medications   Medication Sig Dispense Refill     Aspirin 81 MG CAPS        metoprolol succinate ER (TOPROL XL) 50 MG 24 hr tablet Take 1 tablet (50 mg) by mouth every evening 90 tablet 3     multivitamin, therapeutic with minerals (MULTI-VITAMIN) TABS tablet Take 1 tablet by mouth daily 100 tablet 0     naproxen sodium (ALEVE) 220 MG capsule Take 440 mg by mouth daily as needed (hip pain).       No current facility-administered medications for this visit.      Past Medical/Surgical History:   Patient Active Problem List   Diagnosis     Family history of prostate cancer     Atrial fibrillation with rapid ventricular response (H)     Seborrheic keratoses     Family history of diabetes mellitus     Past Medical History:   Diagnosis Date     Atrial fibrillation with rapid ventricular response (H) 6/25/2022     Blood in semen      Inguinal hernia of right side with gangrene 2003     Low back pain      Prostate infection      STD (sexually transmitted disease)      Varicose vein of leg                         Again, thank you for allowing me to participate in the care of your patient.        Sincerely,        Jennifer Pablo PA-C    Electronically signed

## 2025-04-10 ENCOUNTER — OFFICE VISIT (OUTPATIENT)
Dept: INTERNAL MEDICINE | Facility: CLINIC | Age: 65
End: 2025-04-10
Payer: COMMERCIAL

## 2025-04-10 VITALS
TEMPERATURE: 97.9 F | HEIGHT: 75 IN | WEIGHT: 189 LBS | SYSTOLIC BLOOD PRESSURE: 110 MMHG | HEART RATE: 65 BPM | BODY MASS INDEX: 23.5 KG/M2 | OXYGEN SATURATION: 98 % | DIASTOLIC BLOOD PRESSURE: 74 MMHG

## 2025-04-10 DIAGNOSIS — I48.0 PAROXYSMAL ATRIAL FIBRILLATION (H): ICD-10-CM

## 2025-04-10 DIAGNOSIS — Z12.5 SCREENING FOR PROSTATE CANCER: ICD-10-CM

## 2025-04-10 DIAGNOSIS — I48.91 ATRIAL FIBRILLATION WITH RAPID VENTRICULAR RESPONSE (H): ICD-10-CM

## 2025-04-10 DIAGNOSIS — D69.6 THROMBOCYTOPENIA: ICD-10-CM

## 2025-04-10 DIAGNOSIS — Z01.818 PREOP GENERAL PHYSICAL EXAM: Primary | ICD-10-CM

## 2025-04-10 DIAGNOSIS — M16.11 PRIMARY OSTEOARTHRITIS OF RIGHT HIP: ICD-10-CM

## 2025-04-10 PROBLEM — G58.9 PINCHED NERVE IN NECK: Status: ACTIVE | Noted: 2023-01-10

## 2025-04-10 PROBLEM — Z82.49 FAMILY HISTORY OF ATRIAL FIBRILLATION: Status: ACTIVE | Noted: 2022-07-05

## 2025-04-10 LAB
ALT SERPL W P-5'-P-CCNC: 24 U/L (ref 0–70)
ANION GAP SERPL CALCULATED.3IONS-SCNC: 9 MMOL/L (ref 7–15)
BUN SERPL-MCNC: 21.9 MG/DL (ref 8–23)
CALCIUM SERPL-MCNC: 9.4 MG/DL (ref 8.8–10.4)
CHLORIDE SERPL-SCNC: 104 MMOL/L (ref 98–107)
CHOLEST SERPL-MCNC: 178 MG/DL
CREAT SERPL-MCNC: 1.02 MG/DL (ref 0.67–1.17)
EGFRCR SERPLBLD CKD-EPI 2021: 82 ML/MIN/1.73M2
ERYTHROCYTE [DISTWIDTH] IN BLOOD BY AUTOMATED COUNT: 12.4 % (ref 10–15)
FASTING STATUS PATIENT QL REPORTED: YES
FASTING STATUS PATIENT QL REPORTED: YES
GLUCOSE SERPL-MCNC: 99 MG/DL (ref 70–99)
HCO3 SERPL-SCNC: 28 MMOL/L (ref 22–29)
HCT VFR BLD AUTO: 45.3 % (ref 40–53)
HDLC SERPL-MCNC: 67 MG/DL
HGB BLD-MCNC: 15.5 G/DL (ref 13.3–17.7)
LDLC SERPL CALC-MCNC: 101 MG/DL
MCH RBC QN AUTO: 32 PG (ref 26.5–33)
MCHC RBC AUTO-ENTMCNC: 34.2 G/DL (ref 31.5–36.5)
MCV RBC AUTO: 93 FL (ref 78–100)
NONHDLC SERPL-MCNC: 111 MG/DL
PLATELET # BLD AUTO: 123 10E3/UL (ref 150–450)
POTASSIUM SERPL-SCNC: 4.5 MMOL/L (ref 3.4–5.3)
PSA SERPL DL<=0.01 NG/ML-MCNC: 3.59 NG/ML (ref 0–4.5)
RBC # BLD AUTO: 4.85 10E6/UL (ref 4.4–5.9)
SODIUM SERPL-SCNC: 141 MMOL/L (ref 135–145)
TRIGL SERPL-MCNC: 52 MG/DL
WBC # BLD AUTO: 5.3 10E3/UL (ref 4–11)

## 2025-04-10 ASSESSMENT — PAIN SCALES - GENERAL: PAINLEVEL_OUTOF10: NO PAIN (0)

## 2025-04-10 NOTE — PROGRESS NOTES
Preoperative Evaluation  [address] [address]  Phone: [phone]  Primary Provider: Felicia Vásquez MD  Pre-op Performing Provider: Franko Seo MD    Apr 10, 2025             4/10/2025   Surgical Information   What procedure is being done? Total hip right replacement   Facility or Hospital where procedure/surgery will be performed:  [facility]   Who is doing the procedure / surgery? Fernando Cotton   Date of surgery / procedure: May 8   Time of surgery / procedure: Afternoon   Where do you plan to recover after surgery? at home with family     Fax number for surgical facility: [phone]    Assessment & Plan     The proposed surgical procedure is considered INTERMEDIATE risk.    1. Preop general physical exam    2. Primary osteoarthritis of right hip    3. Thrombocytopenia    4. Paroxysmal atrial fibrillation (H)    5. Atrial fibrillation with rapid ventricular response (H)    6. Screening for prostate cancer                Risks and Recommendations  The patient has the following additional risks and recommendations for perioperative complications:    Cardiovascular:  --only 2 brief episodes of atrial fibrillation in past 4 years.    --No active cardiac symptoms with vigorous physical activity.     Hematological:  --history of mild thrombocytopenia, without any abnormal bleeding or bruising.   --Current platelet count from today is safe for surgery as planned.      Antiplatelet or Anticoagulation Medication Instructions   - aspirin: Discontinue aspirin 7 days prior to procedure to reduce bleeding risk. It should be resumed postoperatively.     No ASA or NSAIDs within 7 days of surgery.  No fish oil or Vitamin E within 7 days of surgery.       Additional Medication Instructions   - Beta Blockers (atenolol, metoprolol, propranolol) : Continue taking on the day of surgery.    Recommendation  Approval given to proceed  with proposed procedure, without further diagnostic evaluation.    If this surgery is postponed beyond 30 days from today, no need for another pre-op for this procedure. I can write an addendum dated within the 30 days window to clear him for the surgery.             Franko Seo M.D.  Dept. of Internal Medicine  [facility]             Jenni Bernal is a 65 year old, presenting for the following:  Pre-Op Exam        HPI: end stage degenerative joint disease right hip          4/5/2025   Pre-Op Questionnaire   Have you ever had a heart attack or stroke? No   Have you ever had surgery on your heart or blood vessels, such as a stent placement, a coronary artery bypass, or surgery on an artery in your head, neck, heart, or legs? No   Do you have chest pain with activity? No   Do you have a history of heart failure? No   Do you currently have a cold, bronchitis or symptoms of other infection? No   Do you have a cough, shortness of breath, or wheezing? No   Do you or anyone in your family have previous history of blood clots? No   Do you or does anyone in your family have a serious bleeding problem such as prolonged bleeding following surgeries or cuts? No   Have you ever had problems with anemia or been told to take iron pills? No   Have you had any abnormal blood loss such as black, tarry or bloody stools? No   Have you ever had a blood transfusion? No   Are you willing to have a blood transfusion if it is medically needed before, during, or after your surgery? Yes   Have you or any of your relatives ever had problems with anesthesia? No   Do you have sleep apnea, excessive snoring or daytime drowsiness? No   Do you have any artifical heart valves or other implanted medical devices like a pacemaker, defibrillator, or continuous glucose monitor? No   Do you have artificial joints? No   Are you allergic to latex? No     Health Care Directive  Patient does not have a Health Care  Directive:     Preoperative Review of    reviewed - no record of controlled substances prescribed.        *  No recent infectious illnesses.    *  No recent cardiac or pulmonary issues or symptoms.    *  No problems performing vigorous physical activity, no changes in exercise tolerance.    *  No personal or family history of anesthesia complications.    *  No personal or family history of bleeding or clotting disorders.       history of 2 brief episodes of atrial fibrillation since 2022.  None for past 2 years.       Patient Active Problem List    Diagnosis Date Noted    Multiple benign nevi 01/09/2025     Priority: Medium    Cherry angioma 01/09/2025     Priority: Medium    Lentigines 01/09/2025     Priority: Medium    SK (seborrheic keratosis) 01/09/2025     Priority: Medium    Family history of diabetes mellitus 05/15/2024     Priority: Medium    Seborrheic keratoses 12/04/2023     Priority: Medium    Pinched nerve in neck 01/10/2023     Priority: Medium     numbness and tingling in left arm and hand coming from neck. referred to spine specialist. PT has been effective      Family history of atrial fibrillation 07/05/2022     Priority: Medium    Paroxysmal atrial fibrillation (H) 06/25/2022     Priority: Medium     2 brief isolated episodes of atrial fibrillation (6/22 & 3/24)      Family history of prostate cancer 04/15/2022     Priority: Medium     Father dx 75  Brother age 55        Past Medical History:   Diagnosis Date    Atrial fibrillation with rapid ventricular response (H) 6/25/2022    Blood in semen     Inguinal hernia of right side with gangrene 2003    Low back pain     Prostate infection     STD (sexually transmitted disease)     Varicose vein of leg      Past Surgical History:   Procedure Laterality Date    ABDOMEN SURGERY  05/1991    testicular varicose vein repair    COLONOSCOPY  April 2021    MRI Virtual Colonoscopy    COLONOSCOPY N/A 04/01/2021    Procedure: COLONOSCOPY;  Surgeon: Rafi  Gage MITCHELL MD;  Location:  GI    HERNIA REPAIR      inguinal    LIGATE VEIN      '93 scrotum, '14 left leg    SOFT TISSUE SURGERY  2002    Lumbar spine issues, facet joint sundrome     Current Outpatient Medications   Medication Sig Dispense Refill    Aspirin 81 MG CAPS       metoprolol succinate ER (TOPROL XL) 50 MG 24 hr tablet Take 1 tablet (50 mg) by mouth every evening 90 tablet 3    multivitamin, therapeutic with minerals (MULTI-VITAMIN) TABS tablet Take 1 tablet by mouth daily 100 tablet 0    naproxen sodium (ALEVE) 220 MG capsule Take 440 mg by mouth daily as needed (hip pain).         No Known Allergies     Social History     Tobacco Use    Smoking status: Never     Passive exposure: Never    Smokeless tobacco: Never   Substance Use Topics    Alcohol use: Not Currently     Comment: reduced to 3 drinks per week or less     Family History   Problem Relation Age of Onset    Breast Cancer Mother     Alzheimer Disease Mother     Depression Mother     Mental Illness Mother         Bi-Polar, Dementia, passed away age 84    Arrhythmia Mother         fast HR, continued on meds, no blood thinners    Coronary Artery Disease Mother     Atrial fibrillation Mother     Diabetes Father         Type II    Hypertension Father         managed with Medication    Prostate Cancer Father         diagnosis at age 77, now 94    Osteoarthritis Father         hip replacement (overwt)    Coronary Artery Disease Father         Congestive Heart Failure, diagnonsed     Obesity Father     Prostate Cancer Brother 55    Arrhythmia Brother 60        ER visit, meds x 10 days; reported as afib    Atrial fibrillation Brother     Skin Cancer Brother     Prostate Cancer Brother         diagnosis 2021, treatment successful    Osteoarthritis Paternal Grandfather         hip replacements    Colon Cancer Maternal Cousin 53         of colon cancer at 58     History   Drug Use No     Comment: once daily             Review of  "Systems  Constitutional, neuro, ENT, endocrine, pulmonary, cardiac, gastrointestinal, genitourinary, musculoskeletal, integument and psychiatric systems are negative, except as otherwise noted.    Objective    /74   Pulse 65   Temp 97.9  F (36.6  C) (Temporal)   Ht 1.905 m (6' 3\")   Wt 85.7 kg (189 lb)   SpO2 98%   BMI 23.62 kg/m     Estimated body mass index is 23.62 kg/m  as calculated from the following:    Height as of this encounter: 1.905 m (6' 3\").    Weight as of this encounter: 85.7 kg (189 lb).  Physical Exam  Physical Exam  Vitals and nursing note reviewed.   Constitutional:       Comments: Moves normally, alert, no apparent distress  HENT:      Head: Normocephalic and atraumatic.      Nose: Nose normal.   Eyes:      Extraocular Movements: Extraocular movements intact.   Cardiovascular:      Rate and Rhythm: Normal rate and regular rhythm.      Heart sounds: Normal heart sounds.   Pulmonary:      Effort: Pulmonary effort is normal.      Breath sounds: Normal breath sounds.   Abdominal:      General: There is no distension.      Palpations: There is no mass.      Tenderness: No abdominal tenderness, no distention.     Bowel sounds: normal  Musculoskeletal:         General: decreaed range of motion in both hips due to degenerative joint disease    Skin:     General: Skin is warm and dry.   Neurological:      General: No focal deficit present.      Mental Status: Alert, responds to questions, Speech coherent  Psychiatric:         Mood and Affect: Mood normal.      Recent Labs   Lab Test 07/25/24  0835 05/24/24  1353 05/15/24  1004   HGB 15.2 15.8 15.9   * 140* 140*   NA  --   --  140   POTASSIUM  --   --  3.7   CR  --   --  0.87   A1C  --   --  5.2        Results for orders placed or performed in visit on 04/10/25   CBC with platelets     Status: Abnormal   Result Value Ref Range    WBC Count 5.3 4.0 - 11.0 10e3/uL    RBC Count 4.85 4.40 - 5.90 10e6/uL    Hemoglobin 15.5 13.3 - 17.7 g/dL "    Hematocrit 45.3 40.0 - 53.0 %    MCV 93 78 - 100 fL    MCH 32.0 26.5 - 33.0 pg    MCHC 34.2 31.5 - 36.5 g/dL    RDW 12.4 10.0 - 15.0 %    Platelet Count 123 (L) 150 - 450 10e3/uL          Diagnostics  Labs pending at this time.  Results will be reviewed when available.       EKG: sinus bradycardia, normal axis, normal intervals, no acute ST/T changes c/w ischemia, no LVH by voltage criteria, unchanged from previous tracings    Revised Cardiac Risk Index (RCRI)  The patient has the following serious cardiovascular risks for perioperative complications:   - No serious cardiac risks = 0 points     RCRI Interpretation: 0 points: Class I (very low risk - 0.4% complication rate)         Signed Electronically by: Franko Seo MD  A copy of this evaluation report is provided to the requesting physician.

## 2025-04-10 NOTE — PATIENT INSTRUCTIONS
"        PRE-OPERATIVE INSTRUCTIONS:     Contact your surgeon if there is any change in your health. This includes signs of new infection, such as cold or flu (such as a sore throat, runny nose, cough, rash or fever).  Elective surgeries may need to be postponed if there is significant illness present.    Pre-procedure Covid testing is no longer required unless specifically requested by the surgeon or surgical facility.      Stop aspirin of any kind for 7 days before procedure.   (even low dose daily aspirin).    No NSAIDs (Motrin, Advil, ibuprofen, Aleve, etc) within 5-7 days of surgery.    Stop any Fish Oil or multi vitamin (and specifically anything containing vitamin E) supplements for 7 days prior to surgery because these can affect platelet function.      Tylenol (acetaminophen) is OK to take if needed, this medication has no effect on platelet function.  Follow instructions on the bottle    Follow any preparation instructions as given by the surgeons.  Prepare your body as instructed by the surgery clinic.  If instructed, Take a shower or bath the night before surgery. Use any special soaps or cleaning instructions according to instructions from your surgeon.  If you do not have soap from your surgeon, use your regular soap. Do not shave or scrub the surgery site.  Wear clean pajamas and have clean sheets on your bed       ON THE MORNING OF SURGERY:     --No solid food after midnight     --Take Metoprolol as usual.      ON THE MORNING OF SURGERY:     --Take no Morning medications.      Resume all medications at the same doses after surgery (no \"make up\" doses needed), unless instructed otherwise by the medical staff.     Follow all specific postoperative instructions (including any specific medications) as given by the surgeons    Attend all follow up appointments with the surgeons (and/or therapists if applicable) as instructed.     Contact the surgeon's office for any specific questions about after-surgery " cares and follow up instructions.      You do not need to necessarily return to see anyone in the primary care clinic after your surgery unless specifically instructed to do so.      If this specific planned surgery gets re-scheduled to a date that falls outside the 30 day window from the date of this pre-op exam, you do not need to return to see us for another pre-op exam for this specific procedure.  Depending on the rescheduled date, we can probably still clear you for this surgery for this specific procedure with this exam performed today, but I will have to write and addendum from the pre-op exam from today dated within the 30 days of the surgery date.   Please contact me with any changes in the date, time, and place of surgery.         IF YOU REQUIRE NARCOTIC PAIN MEDICATION AFTER YOUR SURGERY:    --Take the narcotic pain medication exactly as prescribed, and use the absolute lowest dose needed for pain control.   The goal of pain medication is not complete pain relief, aim to just make it manageable.    --Beware of drowsiness, nausea, vomiting, when taking this medication.  Do not drive, or operate dangerous equipment after taking this.    --The main side effects from narcotic pain medication can also include intestinal side effects including nausea, vomiting, constipation, or diarrhea.    --If your pain is not able to be controlled with the pain medication supplied to you, contact the surgeons because uncontrolled pain can be a sign of possible surgical complications.    --In case of constipation from pain medications, take over the counter Miralax powder or stool softner Senokot.  If you have a history of constipation with narcotic pain medication, consider taking Miralax powder on any day that you take a pain tablet.

## 2025-04-15 ENCOUNTER — PATIENT OUTREACH (OUTPATIENT)
Dept: CARE COORDINATION | Facility: CLINIC | Age: 65
End: 2025-04-15
Payer: COMMERCIAL

## 2025-04-22 NOTE — PROGRESS NOTES
Cardiology Clinic Progress Note  Gabe Moon MRN# [id number]   YOB: 1960 Age: 65 year old   Primary Cardiologist: Dr. Pugh  Reason for visit: Annual follow up            Assessment and Plan:       1.  Paroxysmal atrial fibrillation, [id number] 0  -No episodes of recurrence since last year, Apple Watch notes a less than 2% burden    2.  Probable SVT  - Noted at peak exercise with 2022 exercise stress echocardiogram  - Occurring relatively rarely and able to be quickly terminated with Valsalva, short in duration    Plan:  Continue metoprolol 50 mg daily  Consider antiarrhythmic medication or ablation procedure if episodes of SVT or atrial fibrillation follow-up with Dr. Nain Seo in 1 year become more frequent and start interfering with his lifestyle  Recommend avoiding regular alcohol use as this seems to be a trigger, okay for an occasional alcoholic drink  Given [id number] score of 0, do not recommend anticoagulation    Follow up: Follow-up with Dr. Nain Seo in 1 year        History of Presenting Illness:    Gabe Moon is a very pleasant 65 year old male with a history of atrial fibrillation s/p unsuccessful DCCV x2 with later spontaneous conversion.     When he was seen by Dr. Nain Seo in May 2024 he had 1 episode of atrial fibrillation occurring over the previous year when he was skiing in [place] with friends in the month of March.  This occurred following increased alcohol use the evening prior.  He went back into atrial fibrillation lasting about 2 to 3 hours and then converted back to sinus rhythm.    Patient is here today for an annual follow-up of his atrial fibrillation.    Patient reports feeling good. He exercises regularly and feels well with this. He lifts weights 3-4 days a week and [place] 3 days a [place].  He is currently unable to or hike due to hip pain and is planning to have a hip replacement in 2 weeks.    About once a month, he  has an episode of rapid heart rate that goes up quickly into the 150s during his warm up phase of exercise. He feels tachycardic without dizziness, lightheadedness, presyncope or syncope. It terminates immediately with valsalva and is typically only present a few seconds.  He checks his Apple Watch each day and has not noted any episodes of atrial fibrillation.    He has stopped drinking alcohol over the last year, but wonders if it is okay for him to occasionally have 1 alcoholic drink, a beer with friends.  He also only drinks decaffeinated coffee and would like to occasionally have a cup of caffeinated coffee.    Patient denies chest pain or chest tightness. Denies dizziness, lightheadedness or other presyncopal symptoms. Denies tachycardia or palpitations.  Denies shortness of breath, orthopnea or PND.    Most recent labs from 4/10/2025 show sodium 141, potassium 4.5, BUN 21.9, creatinine 1.02, GFR 82, ALT 24, total cholesterol 178, HDL 67, , triglycerides 52, 15.5, platelets 123.     Blood pressure 120/75 and HR 59 in clinic today.        Recent Hospitalizations   None in 2020 4/2025          Social History      Social History     Socioeconomic History    Marital status:      Spouse name: Not on file    Number of children: Not on file    Years of education: Not on file    Highest education level: Not on file   Occupational History     Comment: office furniture sales, [employer]   Tobacco Use    Smoking status: Never     Passive exposure: Never    Smokeless tobacco: Never   Vaping Use    Vaping status: Never Used   Substance and Sexual Activity    Alcohol use: Not Currently     Comment: reduced to 3 drinks per week or less    Drug use: No     Comment: once daily    Sexual activity: Yes     Partners: Female     Birth control/protection: Condom   Other Topics Concern    Parent/sibling w/ CABG, MI or angioplasty before 65F 55M? No   Social History Narrative    Not on file     Social Drivers of  Health     Financial Resource Strain: Low Risk  (5/8/2024)    Financial Resource Strain     Within the past 12 months, have you or your family members you live with been unable to get utilities (heat, electricity) when it was really needed?: No   Food Insecurity: Low Risk  (5/8/2024)    Food Insecurity     Within the past 12 months, did you worry that your food would run out before you got money to buy more?: No     Within the past 12 months, did the food you bought just not last and you didn t have money to get more?: No   Transportation Needs: Low Risk  (5/8/2024)    Transportation Needs     Within the past 12 months, has lack of transportation kept you from medical appointments, getting your medicines, non-medical meetings or appointments, work, or from getting things that you need?: No   Physical Activity: Sufficiently Active (5/8/2024)    Exercise Vital Sign     Days of Exercise per Week: 6 days     Minutes of Exercise per Session: 40 min   Stress: No Stress Concern Present (5/8/2024)    [employer] - Occupational Stress Questionnaire     Feeling of Stress : Only a little   Social Connections: Unknown (5/8/2024)    Social Connection and Isolation Panel [NHANES]     Frequency of Communication with Friends and Family: Not on file     Frequency of Social Gatherings with Friends and Family: Once a week     Attends Mandaen Services: Not on file     Active Member of Clubs or Organizations: Not on file     Attends Club or Organization Meetings: Not on file     Marital Status: Not on file   Interpersonal Safety: Low Risk  (4/10/2025)    Interpersonal Safety     Do you feel physically and emotionally safe where you currently live?: Yes     Within the past 12 months, have you been hit, slapped, kicked or otherwise physically hurt by someone?: No     Within the past 12 months, have you been humiliated or emotionally abused in other ways by your partner or ex-partner?: No   Housing Stability:  "Low Risk  (5/8/2024)    Housing Stability     Do you have housing? : Yes     Are you worried about losing your housing?: No            Review of Systems:   Skin:  not assessed     Eyes:  not assessed    ENT:  not assessed    Respiratory:  Negative    Cardiovascular:  Negative    Gastroenterology: not assessed    Genitourinary:  not assessed    Musculoskeletal:  not assessed    Neurologic:  not assessed    Psychiatric:  not assessed    Heme/Lymph/Imm:  not assessed    Endocrine:  not assessed           Physical Exam:   Vitals: /75   Pulse 59   Ht 1.905 m (6' 3\")   Wt 87.5 kg (193 lb)   SpO2 98%   BMI 24.12 kg/m     Wt Readings from Last 4 Encounters:   04/24/25 87.5 kg (193 lb)   04/10/25 85.7 kg (189 lb)   07/25/24 86.6 kg (191 lb)   05/15/24 85.4 kg (188 lb 4.8 oz)     GEN: well nourished, in no acute distress.  HEENT:  Pupils equal, round. Sclerae nonicteric.   NECK: Supple, no masses appreciated. No JVD with patient supine. No carotid bruit  C/V:  Regular rate and rhythm, no murmur, rub or gallop.    RESP: Respirations are unlabored. Clear to auscultation bilaterally without wheezing, rales, or rhonchi.  GI: Abdomen soft, nontender.  EXTREM: No LE edema.  NEURO: Alert and oriented, cooperative.  SKIN: Warm and dry.        Data:     LIPID RESULTS:  Lab Results   Component Value Date    CHOL 178 04/10/2025    CHOL 199 02/19/2021    HDL 67 04/10/2025    HDL 93 02/19/2021     (H) 04/10/2025    LDL 96 02/19/2021    TRIG 52 04/10/2025    TRIG 49 02/19/2021     LIVER ENZYME RESULTS:  Lab Results   Component Value Date    AST 27 05/15/2024    ALT 24 04/10/2025     CBC RESULTS:  Lab Results   Component Value Date    WBC 5.3 04/10/2025    RBC 4.85 04/10/2025    HGB 15.5 04/10/2025    HCT 45.3 04/10/2025    MCV 93 04/10/2025    MCH 32.0 04/10/2025    MCHC 34.2 04/10/2025    RDW 12.4 04/10/2025     (L) 04/10/2025     BMP RESULTS:  Lab Results   Component Value Date     04/10/2025     " "02/19/2021    POTASSIUM 4.5 04/10/2025    POTASSIUM 3.5 06/26/2022    POTASSIUM 4.0 02/19/2021    CHLORIDE 104 04/10/2025    CHLORIDE 107 06/26/2022    CHLORIDE 105 02/19/2021    CO2 28 04/10/2025    CO2 30 06/26/2022    CO2 27 02/19/2021    ANIONGAP 9 04/10/2025    ANIONGAP 4 06/26/2022    ANIONGAP 7 02/19/2021    GLC 99 04/10/2025     (H) 06/26/2022    GLC 85 02/19/2021    BUN 21.9 04/10/2025    BUN 16 06/26/2022    BUN 23 02/19/2021    CR 1.02 04/10/2025    CR 0.82 02/19/2021    GFRESTIMATED 82 04/10/2025    GFRESTIMATED >90 02/19/2021    GFRESTBLACK >90 02/19/2021    TIMOTEO 9.4 04/10/2025    TIMOTEO 9.7 02/19/2021      A1C RESULTS:  Lab Results   Component Value Date    A1C 5.2 05/15/2024     INR RESULTS:  No results found for: \"INR\"         Medications     Current Outpatient Medications   Medication Sig Dispense Refill    Aspirin 81 MG CAPS       metoprolol succinate ER (TOPROL XL) 50 MG 24 hr tablet Take 1 tablet (50 mg) by mouth every evening 90 tablet 3    multivitamin, therapeutic with minerals (MULTI-VITAMIN) TABS tablet Take 1 tablet by mouth daily 100 tablet 0    naproxen sodium (ALEVE) 220 MG capsule Take 440 mg by mouth daily as needed (hip pain).            Past Medical History     Past Medical History:   Diagnosis Date    Atrial fibrillation with rapid ventricular response (H) 6/25/2022    Blood in semen     Inguinal hernia of right side with gangrene 2003    Low back pain     Prostate infection     STD (sexually transmitted disease)     Varicose vein of leg      Past Surgical History:   Procedure Laterality Date    ABDOMEN SURGERY  05/1991    testicular varicose vein repair    COLONOSCOPY  April 2021    MRI Virtual Colonoscopy    COLONOSCOPY N/A 04/01/2021    Procedure: COLONOSCOPY;  Surgeon: Gage Mckee MD;  Location:  GI    HERNIA REPAIR  2003    inguinal    LIGATE VEIN      '93 scrotum, '14 left leg    SOFT TISSUE SURGERY  01/2002    Lumbar spine issues, facet joint sundrome     Family " History   Problem Relation Age of Onset    Breast Cancer Mother     Alzheimer Disease Mother     Depression Mother     Mental Illness Mother         Bi-Polar, Dementia, passed away age 84    Arrhythmia Mother         fast HR, continued on meds, no blood thinners    Coronary Artery Disease Mother     Atrial fibrillation Mother     Diabetes Father         Type II    Hypertension Father         managed with Medication    Prostate Cancer Father         diagnosis at age 77, now 94    Osteoarthritis Father         hip replacement (overwt)    Coronary Artery Disease Father         Congestive Heart Failure, diagnonsed     Obesity Father     Prostate Cancer Brother 55    Arrhythmia Brother 60        ER visit, meds x 10 days; reported as afib    Atrial fibrillation Brother     Skin Cancer Brother     Prostate Cancer Brother         diagnosis 2021, treatment successful    Osteoarthritis Paternal Grandfather         hip replacements    Colon Cancer Maternal Cousin 53         of colon cancer at 58            Allergies   Patient has no known allergies.        Alka Wilson NP  [facility]

## 2025-04-24 ENCOUNTER — OFFICE VISIT (OUTPATIENT)
Dept: CARDIOLOGY | Facility: CLINIC | Age: 65
End: 2025-04-24
Payer: COMMERCIAL

## 2025-04-24 VITALS
HEART RATE: 59 BPM | SYSTOLIC BLOOD PRESSURE: 120 MMHG | DIASTOLIC BLOOD PRESSURE: 75 MMHG | WEIGHT: 193 LBS | BODY MASS INDEX: 24 KG/M2 | HEIGHT: 75 IN | OXYGEN SATURATION: 98 %

## 2025-04-24 DIAGNOSIS — I48.91 ATRIAL FIBRILLATION WITH RAPID VENTRICULAR RESPONSE (H): Primary | ICD-10-CM

## 2025-04-24 RX ORDER — METOPROLOL SUCCINATE 50 MG/1
50 TABLET, EXTENDED RELEASE ORAL EVERY EVENING
Qty: 90 TABLET | Refills: 3 | Status: SHIPPED | OUTPATIENT
Start: 2025-04-24

## 2025-04-24 NOTE — LETTER
4/24/2025    Felicia Vásquez MD  [address]  [place] [address]    RE: Gabe Moon       Dear Colleague,     I had the pleasure of seeing Gabe Moon in the [facility].    Cardiology Clinic Progress Note  Gabe Moon MRN# [id number]   YOB: 1960 Age: 65 year old   Primary Cardiologist: Dr. Pugh  Reason for visit: Annual follow up            Assessment and Plan:       1.  Paroxysmal atrial fibrillation, [id number] 0  -No episodes of recurrence since last year, Apple Watch notes a less than 2% burden    2.  Probable SVT  - Noted at peak exercise with 2022 exercise stress echocardiogram  - Occurring relatively rarely and able to be quickly terminated with Valsalva, short in duration    Plan:  Continue metoprolol 50 mg daily  Consider antiarrhythmic medication or ablation procedure if episodes of SVT or atrial fibrillation follow-up with Dr. Nain Seo in 1 year become more frequent and start interfering with his lifestyle  Recommend avoiding regular alcohol use as this seems to be a trigger, okay for an occasional alcoholic drink  Given [id number] score of 0, do not recommend anticoagulation    Follow up: Follow-up with Dr. Nain Seo in 1 year        History of Presenting Illness:    Gabe Moon is a very pleasant 65 year old male with a history of atrial fibrillation s/p unsuccessful DCCV x2 with later spontaneous conversion.     When he was seen by Dr. Nain Seo in May 2024 he had 1 episode of atrial fibrillation occurring over the previous year when he was [place] in [email or web address] with friends in the month of March.  This occurred following increased alcohol use the evening prior.  He went back into atrial fibrillation lasting about 2 to 3 hours and then converted back to sinus rhythm.    Patient is here today for an annual follow-up of his atrial fibrillation.    Patient reports feeling good. He exercises regularly and feels well  with this. He lifts weights 3-4 days a week and [place] 3 days a [place].  He is currently unable to or hike due to hip pain and is planning to have a hip replacement in 2 weeks.    About once a month, he has an episode of rapid heart rate that goes up quickly into the 150s during his warm up phase of exercise. He feels tachycardic without dizziness, lightheadedness, presyncope or syncope. It terminates immediately with valsalva and is typically only present a few seconds.  He checks his Apple Watch each day and has not noted any episodes of atrial fibrillation.    He has stopped drinking alcohol over the last year, but wonders if it is okay for him to occasionally have 1 alcoholic drink, a beer with friends.  He also only drinks decaffeinated coffee and would like to occasionally have a cup of caffeinated coffee.    Patient denies chest pain or chest tightness. Denies dizziness, lightheadedness or other presyncopal symptoms. Denies tachycardia or palpitations.  Denies shortness of breath, orthopnea or PND.    Most recent labs from 4/10/2025 show sodium 141, potassium 4.5, BUN 21.9, creatinine 1.02, GFR 82, ALT 24, total cholesterol 178, HDL 67, , triglycerides 52, 15.5, platelets 123.     Blood pressure 120/75 and HR 59 in clinic today.        Recent Hospitalizations   None in 2020 4/2025          Social History      Social History     Socioeconomic History     Marital status:      Spouse name: Not on file     Number of children: Not on file     Years of education: Not on file     Highest education level: Not on file   Occupational History     Comment: office furniture sales, [employer]   Tobacco Use     Smoking status: Never     Passive exposure: Never     Smokeless tobacco: Never   Vaping Use     Vaping status: Never Used   Substance and Sexual Activity     Alcohol use: Not Currently     Comment: reduced to 3 drinks per week or less     Drug use: No     Comment: once daily      Sexual activity: Yes     Partners: Female     Birth control/protection: Condom   Other Topics Concern     Parent/sibling w/ CABG, MI or angioplasty before 65F 55M? No   Social History Narrative     Not on file     Social Drivers of Health     Financial Resource Strain: Low Risk  (5/8/2024)    Financial Resource Strain      Within the past 12 months, have you or your family members you live with been unable to get utilities (heat, electricity) when it was really needed?: No   Food Insecurity: Low Risk  (5/8/2024)    Food Insecurity      Within the past 12 months, did you worry that your food would run out before you got money to buy more?: No      Within the past 12 months, did the food you bought just not last and you didn t have money to get more?: No   Transportation Needs: Low Risk  (5/8/2024)    Transportation Needs      Within the past 12 months, has lack of transportation kept you from medical appointments, getting your medicines, non-medical meetings or appointments, work, or from getting things that you need?: No   Physical Activity: Sufficiently Active (5/8/2024)    Exercise Vital Sign      Days of Exercise per Week: 6 days      Minutes of Exercise per Session: 40 min   Stress: No Stress Concern Present (5/8/2024)    [employer] - Occupational Stress Questionnaire      Feeling of Stress : Only a little   Social Connections: Unknown (5/8/2024)    Social Connection and Isolation Panel [NHANES]      Frequency of Communication with Friends and Family: Not on file      Frequency of Social Gatherings with Friends and Family: Once a week      Attends Holiness Services: Not on file      Active Member of Clubs or Organizations: Not on file      Attends Club or Organization Meetings: Not on file      Marital Status: Not on file   Interpersonal Safety: Low Risk  (4/10/2025)    Interpersonal Safety      Do you feel physically and emotionally safe where you currently live?: Yes      Within  "the past 12 months, have you been hit, slapped, kicked or otherwise physically hurt by someone?: No      Within the past 12 months, have you been humiliated or emotionally abused in other ways by your partner or ex-partner?: No   Housing Stability: Low Risk  (5/8/2024)    Housing Stability      Do you have housing? : Yes      Are you worried about losing your housing?: No            Review of Systems:   Skin:  not assessed     Eyes:  not assessed    ENT:  not assessed    Respiratory:  Negative    Cardiovascular:  Negative    Gastroenterology: not assessed    Genitourinary:  not assessed    Musculoskeletal:  not assessed    Neurologic:  not assessed    Psychiatric:  not assessed    Heme/Lymph/Imm:  not assessed    Endocrine:  not assessed           Physical Exam:   Vitals: /75   Pulse 59   Ht 1.905 m (6' 3\")   Wt 87.5 kg (193 lb)   SpO2 98%   BMI 24.12 kg/m     Wt Readings from Last 4 Encounters:   04/24/25 87.5 kg (193 lb)   04/10/25 85.7 kg (189 lb)   07/25/24 86.6 kg (191 lb)   05/15/24 85.4 kg (188 lb 4.8 oz)     GEN: well nourished, in no acute distress.  HEENT:  Pupils equal, round. Sclerae nonicteric.   NECK: Supple, no masses appreciated. No JVD with patient supine. No carotid bruit  C/V:  Regular rate and rhythm, no murmur, rub or gallop.    RESP: Respirations are unlabored. Clear to auscultation bilaterally without wheezing, rales, or rhonchi.  GI: Abdomen soft, nontender.  EXTREM: No LE edema.  NEURO: Alert and oriented, cooperative.  SKIN: Warm and dry.        Data:     LIPID RESULTS:  Lab Results   Component Value Date    CHOL 178 04/10/2025    CHOL 199 02/19/2021    HDL 67 04/10/2025    HDL 93 02/19/2021     (H) 04/10/2025    LDL 96 02/19/2021    TRIG 52 04/10/2025    TRIG 49 02/19/2021     LIVER ENZYME RESULTS:  Lab Results   Component Value Date    AST 27 05/15/2024    ALT 24 04/10/2025     CBC RESULTS:  Lab Results   Component Value Date    WBC 5.3 04/10/2025    RBC 4.85 04/10/2025 " "   HGB 15.5 04/10/2025    HCT 45.3 04/10/2025    MCV 93 04/10/2025    MCH 32.0 04/10/2025    MCHC 34.2 04/10/2025    RDW 12.4 04/10/2025     (L) 04/10/2025     BMP RESULTS:  Lab Results   Component Value Date     04/10/2025     02/19/2021    POTASSIUM 4.5 04/10/2025    POTASSIUM 3.5 06/26/2022    POTASSIUM 4.0 02/19/2021    CHLORIDE 104 04/10/2025    CHLORIDE 107 06/26/2022    CHLORIDE 105 02/19/2021    CO2 28 04/10/2025    CO2 30 06/26/2022    CO2 27 02/19/2021    ANIONGAP 9 04/10/2025    ANIONGAP 4 06/26/2022    ANIONGAP 7 02/19/2021    GLC 99 04/10/2025     (H) 06/26/2022    GLC 85 02/19/2021    BUN 21.9 04/10/2025    BUN 16 06/26/2022    BUN 23 02/19/2021    CR 1.02 04/10/2025    CR 0.82 02/19/2021    GFRESTIMATED 82 04/10/2025    GFRESTIMATED >90 02/19/2021    GFRESTBLACK >90 02/19/2021    TIMOTEO 9.4 04/10/2025    TIMOTEO 9.7 02/19/2021      A1C RESULTS:  Lab Results   Component Value Date    A1C 5.2 05/15/2024     INR RESULTS:  No results found for: \"INR\"         Medications     Current Outpatient Medications   Medication Sig Dispense Refill     Aspirin 81 MG CAPS        metoprolol succinate ER (TOPROL XL) 50 MG 24 hr tablet Take 1 tablet (50 mg) by mouth every evening 90 tablet 3     multivitamin, therapeutic with minerals (MULTI-VITAMIN) TABS tablet Take 1 tablet by mouth daily 100 tablet 0     naproxen sodium (ALEVE) 220 MG capsule Take 440 mg by mouth daily as needed (hip pain).            Past Medical History     Past Medical History:   Diagnosis Date     Atrial fibrillation with rapid ventricular response (H) 6/25/2022     Blood in semen      Inguinal hernia of right side with gangrene 2003     Low back pain      Prostate infection      STD (sexually transmitted disease)      Varicose vein of leg      Past Surgical History:   Procedure Laterality Date     ABDOMEN SURGERY  05/1991    testicular varicose vein repair     COLONOSCOPY  April 2021    MRI Virtual Colonoscopy     COLONOSCOPY " N/A 2021    Procedure: COLONOSCOPY;  Surgeon: Gage Mckee MD;  Location:  GI     HERNIA REPAIR      inguinal     LIGATE VEIN      '93 scrotum, '14 left leg     SOFT TISSUE SURGERY  2002    Lumbar spine issues, facet joint sundrome     Family History   Problem Relation Age of Onset     Breast Cancer Mother      Alzheimer Disease Mother      Depression Mother      Mental Illness Mother         Bi-Polar, Dementia, passed away age 84     Arrhythmia Mother         fast HR, continued on meds, no blood thinners     Coronary Artery Disease Mother      Atrial fibrillation Mother      Diabetes Father         Type II     Hypertension Father         managed with Medication     Prostate Cancer Father         diagnosis at age 77, now 94     Osteoarthritis Father         hip replacement (overwt)     Coronary Artery Disease Father         Congestive Heart Failure, diagnonsed      Obesity Father      Prostate Cancer Brother 55     Arrhythmia Brother 60        ER visit, meds x 10 days; reported as afib     Atrial fibrillation Brother      Skin Cancer Brother      Prostate Cancer Brother         diagnosis 2021, treatment successful     Osteoarthritis Paternal Grandfather         hip replacements     Colon Cancer Maternal Cousin 53         of colon cancer at 58            Allergies   Patient has no known allergies.        Alka Wilson NP  [facility]       Thank you for allowing me to participate in the care of your patient.      Sincerely,     Alka Wilson NP     [facility]  cc:   Emmanuel Pugh MD  [address] [address]

## 2025-04-29 ENCOUNTER — PATIENT OUTREACH (OUTPATIENT)
Dept: CARE COORDINATION | Facility: CLINIC | Age: 65
End: 2025-04-29
Payer: COMMERCIAL

## 2025-05-10 ENCOUNTER — HOSPITAL ENCOUNTER (EMERGENCY)
Facility: CLINIC | Age: 65
Discharge: OTHER INSTITUTION | End: 2025-05-10
Attending: EMERGENCY MEDICINE | Admitting: EMERGENCY MEDICINE
Payer: COMMERCIAL

## 2025-05-10 ENCOUNTER — NURSE TRIAGE (OUTPATIENT)
Dept: NURSING | Facility: CLINIC | Age: 65
End: 2025-05-10
Payer: COMMERCIAL

## 2025-05-10 ENCOUNTER — HOSPITAL ENCOUNTER (EMERGENCY)
Facility: CLINIC | Age: 65
Discharge: HOME | End: 2025-05-11
Attending: EMERGENCY MEDICINE | Admitting: EMERGENCY MEDICINE
Payer: COMMERCIAL

## 2025-05-10 VITALS
OXYGEN SATURATION: 100 % | HEIGHT: 75 IN | BODY MASS INDEX: 23.75 KG/M2 | TEMPERATURE: 97.7 F | WEIGHT: 191 LBS | DIASTOLIC BLOOD PRESSURE: 74 MMHG | RESPIRATION RATE: 16 BRPM | HEART RATE: 61 BPM | SYSTOLIC BLOOD PRESSURE: 161 MMHG

## 2025-05-10 VITALS
DIASTOLIC BLOOD PRESSURE: 86 MMHG | SYSTOLIC BLOOD PRESSURE: 130 MMHG | RESPIRATION RATE: 18 BRPM | TEMPERATURE: 98.3 F | HEART RATE: 67 BPM | OXYGEN SATURATION: 99 %

## 2025-05-10 DIAGNOSIS — H33.311 RETINAL TEAR OF RIGHT EYE: ICD-10-CM

## 2025-05-10 DIAGNOSIS — H54.61 VISION LOSS OF RIGHT EYE: ICD-10-CM

## 2025-05-10 PROCEDURE — 99282 EMERGENCY DEPT VISIT SF MDM: CPT | Performed by: EMERGENCY MEDICINE

## 2025-05-10 PROCEDURE — 99285 EMERGENCY DEPT VISIT HI MDM: CPT

## 2025-05-10 PROCEDURE — 99283 EMERGENCY DEPT VISIT LOW MDM: CPT | Performed by: EMERGENCY MEDICINE

## 2025-05-10 ASSESSMENT — VISUAL ACUITY
OS: 20/25;WITH CORRECTIVE LENSES
OD: LESS THAN 20/400;WITH CORRECTIVE LENSES

## 2025-05-10 ASSESSMENT — ACTIVITIES OF DAILY LIVING (ADL)
ADLS_ACUITY_SCORE: 41
ADLS_ACUITY_SCORE: 41

## 2025-05-10 ASSESSMENT — COLUMBIA-SUICIDE SEVERITY RATING SCALE - C-SSRS
2. HAVE YOU ACTUALLY HAD ANY THOUGHTS OF KILLING YOURSELF IN THE PAST MONTH?: NO
1. IN THE PAST MONTH, HAVE YOU WISHED YOU WERE DEAD OR WISHED YOU COULD GO TO SLEEP AND NOT WAKE UP?: NO
6. HAVE YOU EVER DONE ANYTHING, STARTED TO DO ANYTHING, OR PREPARED TO DO ANYTHING TO END YOUR LIFE?: NO

## 2025-05-11 PROCEDURE — 99282 EMERGENCY DEPT VISIT SF MDM: CPT | Mod: GC | Performed by: STUDENT IN AN ORGANIZED HEALTH CARE EDUCATION/TRAINING PROGRAM

## 2025-05-11 NOTE — ED TRIAGE NOTES
Patient coming in with eye issues.  On Monday he started having more floaters in the eye and saw the eye doctor who said it was healthy.  Today he noticed what seemed to be a curtain covering his vision on the right eye and looks like blurry vision and looks like he is underwater.  Vision is foggy but no double vision.  Denies pain in the eye.  Normally wears contacts but has not wore them for a few days.

## 2025-05-11 NOTE — ED PROVIDER NOTES
[facility] EMERGENCY DEPARTMENT ([facility])    5/10/25          History     Chief Complaint   Patient presents with    Eye Problem     Pt reports he was watching TV when he developed black squiggles in the right side of his right eye that moved across his eye. He then developed blurry vision on the right eye.  Pt stiven pain     HPI  Gabe Moon is a 65 year old male who with past medical history of atrial fibrillation presents to the ED due to eye problem.    Patient is a 65-year-old male who presented to the ER due to blurriness in the right eye that started at 7 PM tonight.  Patient recently had a right hip replacement.  Patient is on metoprolol and a daily aspirin.  Patient went to [facility] ER and there was sent here for concern for possible retinal detachment.  Patient notes normal vision in the left eye.  He normally does wear glasses.  Patient says currently he feels like he is looking through a dirty window in the right eye.  No other pain or symptoms.    This part of the medical record was transcribed by STANLEY ADAMES, Medical Scribe, from a dictation done by, Summer Kimball MD.      As per epic review patient presented to [facility] due to vision loss in his right eye. Informal bedside ultrasound concerning for potential retinal defect.  History and exam concerning for retinal tear/detachment.  Discussed with [facility] ophthalmology as ophthalmology is not available at [facility].  [place] [facility] [place].      Past Medical History  Past Medical History:   Diagnosis Date    Atrial fibrillation with rapid ventricular response (H) 6/25/2022    Blood in semen     Inguinal hernia of right side with gangrene 2003    Low back pain     Prostate infection     STD (sexually transmitted disease)     Varicose vein of leg      Past Surgical History:   Procedure Laterality Date    ABDOMEN SURGERY  05/1991    testicular varicose vein repair     COLONOSCOPY  2021    MRI Virtual Colonoscopy    COLONOSCOPY N/A 2021    Procedure: COLONOSCOPY;  Surgeon: Gage Mckee MD;  Location: [facility]    HERNIA REPAIR      inguinal    LIGATE VEIN      ' scrotum, '14 left leg    SOFT TISSUE SURGERY  2002    Lumbar spine issues, facet joint sundrome     Aspirin 81 MG CAPS  metoprolol succinate ER (TOPROL XL) 50 MG 24 hr tablet  multivitamin, therapeutic with minerals (MULTI-VITAMIN) TABS tablet  naproxen sodium (ALEVE) 220 MG capsule      No Known Allergies  Family History  Family History   Problem Relation Age of Onset    Breast Cancer Mother     Alzheimer Disease Mother     Depression Mother     Mental Illness Mother         Bi-Polar, Dementia, passed away age 84    Arrhythmia Mother         fast HR, continued on meds, no blood thinners    Coronary Artery Disease Mother     Atrial fibrillation Mother     Diabetes Father         Type II    Hypertension Father         managed with Medication    Prostate Cancer Father         diagnosis at age 77, now 94    Osteoarthritis Father         hip replacement (overwt)    Coronary Artery Disease Father         Congestive Heart Failure, diagnonsed     Obesity Father     Prostate Cancer Brother 55    Arrhythmia Brother 60        ER visit, meds x 10 days; reported as afib    Atrial fibrillation Brother     Skin Cancer Brother     Prostate Cancer Brother         diagnosis 2021, treatment successful    Osteoarthritis Paternal Grandfather         hip replacements    Colon Cancer Maternal Cousin 53         of colon cancer at 58     Social History   Social History     Tobacco Use    Smoking status: Never     Passive exposure: Never    Smokeless tobacco: Never   Vaping Use    Vaping status: Never Used   Substance Use Topics    Alcohol use: Not Currently     Comment: reduced to 3 drinks per week or less    Drug use: No     Comment: once daily      Past medical history, past surgical history, medications,  allergies, family history, and social history were reviewed with the patient. No additional pertinent items.   A complete review of systems was performed with pertinent positives and negatives noted in the HPI, and all other systems negative.    Physical Exam   BP: 130/86  Pulse: 67  Temp: 98.3  F (36.8  C)  Resp: 18  SpO2: 99 %  Physical Exam  Constitutional:       General: He is not in acute distress.     Appearance: He is not ill-appearing, toxic-appearing or diaphoretic.   Eyes:      Comments: No visual field cut in the right eye.  Able to have full vision but appears to be seen through her blurry window.  Pupils reactive and equal.   Neurological:      General: No focal deficit present.      Mental Status: He is oriented to person, place, and time.   Psychiatric:         Mood and Affect: Mood normal.         Behavior: Behavior normal.         Thought Content: Thought content normal.           ED Course, Procedures, & Data      Procedures         No results found for any visits on 05/10/25.  Medications - No data to display         No results found for any visits on 05/10/25.  Medications - No data to display  Labs Ordered and Resulted from Time of ED Arrival to Time of ED Departure - No data to display  No orders to display          Critical care was not performed.     Medical Decision Making  The patient's presentation was of moderate complexity (an acute illness with systemic symptoms).    The patient's evaluation involved:  discussion of management or test interpretation with another health professional (ophthomologist)    The patient's management necessitated moderate risk (comprehensive eye exam).    Assessment & Plan    Patient was seen by ophthalmology.  They evaluated him and found him have a retinal tear.  We are having him follow-up tomorrow in clinic for further treatment.  No other cute issues.  Patient stable for discharge from the ER [facility].  This part of the medical record was transcribed by  STANLEY ADAMES, Medical Scribe, from a dictation done by, Summer Kimball MD.      I have reviewed the nursing notes. I have reviewed the findings, diagnosis, plan and need for follow up with the patient.    New Prescriptions    No medications on file       Final diagnoses:   Retinal tear of right eye       Summer Kimball MD.  [facility] EMERGENCY DEPARTMENT  5/10/2025     Summer Kimball MD  05/11/25 9014

## 2025-05-11 NOTE — ED TRIAGE NOTES
Triage Assessment (Adult)       Row Name 05/10/25 1176          Triage Assessment    Airway WDL WDL        Respiratory WDL    Respiratory WDL WDL        Skin Circulation/Temperature WDL    Skin Circulation/Temperature WDL WDL        Cardiac WDL    Cardiac WDL WDL        Peripheral/Neurovascular WDL    Peripheral Neurovascular WDL WDL        Cognitive/Neuro/Behavioral WDL    Cognitive/Neuro/Behavioral WDL WDL                     
     Triage Assessment (Adult)       Row Name 05/10/25 1482          Triage Assessment    Airway WDL WDL        Respiratory WDL    Respiratory WDL WDL        Skin Circulation/Temperature WDL    Skin Circulation/Temperature WDL WDL        Cardiac WDL    Cardiac WDL WDL        Peripheral/Neurovascular WDL    Peripheral Neurovascular WDL WDL        Cognitive/Neuro/Behavioral WDL    Cognitive/Neuro/Behavioral WDL WDL                     
     Triage Assessment (Adult)       Row Name 05/10/25 8403          Triage Assessment    Airway WDL WDL        Respiratory WDL    Respiratory WDL WDL        Skin Circulation/Temperature WDL    Skin Circulation/Temperature WDL WDL        Cardiac WDL    Cardiac WDL WDL        Peripheral/Neurovascular WDL    Peripheral Neurovascular WDL WDL        Cognitive/Neuro/Behavioral WDL    Cognitive/Neuro/Behavioral WDL WDL                     
x1

## 2025-05-11 NOTE — CONFIDENTIAL NOTE
Nurse Triage SBAR    Is this a 2nd Level Triage? YES, LICENSED PRACTITIONER REVIEW IS REQUIRED    Situation: right eye vision chage    Severe floaters and foggy vision started at 7pm  No pain in eye or headache, no weakness/numbness    Background:     Assessment: second level triage    Protocol Recommended Disposition:   Second level triage    Recommendation: ED?     Paged to provider    [facility] Provider consult indicated.    Reason for page: vision change    Specialty Group number: [id number]   Specialty Group: FM-Family Medicine    Initial page sent to Dr. Edmondson by RN at 7:48 pm.     Basia Mclean RN        Provider, Dr. Edmondson, returning page to Nurse Advisors at 7:50 pm    Provider recommended plan of care: be evaluated in the ED.    Provider Recommendation Follow Up:   Reached patient/caregiver. Informed of provider's recommendations. Patient verbalized understanding and agrees with the plan. Patient will go to the [facility] ED.          Basia Mclean RN

## 2025-05-11 NOTE — CONSULTS
OPHTHALMOLOGY CONSULT NOTE  05/11/2025    Patient: Gabe Moon          ASSESSMENT/PLAN:     Gabe Moon is a 65 year old male who presented with     Vitreous hemorrhage, right eye  Horseshoe tear, right eye   Onset a few hours prior to presentation. BCVA 20/200. No APD. Examination showed vitreous hemorrhage in the right eye. Small horseshoe tear appreciated by scleral depression in far periphery around 1:00 to 2:00. Retina appeared attached by fundoscopy and ocular ultrasound.    Recommendations:  - Urgent laser retinopexy to prevent progression to retinal detachment (see procedure note below)   - Sleep with head of bed elevated   - Follow up in retina clinic in 1 week     Discussed with Dr. Naina Arevalo, vitreoretinal surgery fellow, and Dr. Danay Dorman, PGY-3 ophthalmology resident, who agreed with this assessment and plan.     Alok Nowak MD, PGY2  Ophthalmology Resident  [facility]    HISTORY OF PRESENTING ILLNESS:     Gabe Moon is a 65 year old male who presented on 5/11/25 with blurred vision and floaters.    - Vision changes developed this evening  - Began with numerous floaters, right eye  - Progressed to diffusely blurred vision  - Denies eye pain, diplopia, headache, jaw pain, jaw claudication, shoulder pain, fever, chills, weight loss/gain    10+ review of systems were otherwise negative except for that which has been stated above.      OCULAR/MEDICAL/SURGICAL HISTORIES:     Past Ocular History:   Last eye exam: within the last year   Previously diagnosed ocular conditions: myopia  Prior eye surgery/laser: none  Contact lens wear: occasional   Glasses: progressives    Pertinent Systemic Medications:   Current Outpatient Medications   Medication Instructions    Aspirin 81 MG CAPS No dose, route, or frequency recorded.    metoprolol succinate ER (TOPROL XL) 50 mg, Oral, EVERY EVENING    multivitamin, therapeutic with minerals (MULTI-VITAMIN) TABS tablet 1 tablet,  Oral, DAILY    naproxen sodium (ALEVE) 440 mg, DAILY PRN         Past Medical History:  Past Medical History:   Diagnosis Date    Atrial fibrillation with rapid ventricular response (H) 6/25/2022    Blood in semen     Inguinal hernia of right side with gangrene 2003    Low back pain     Prostate infection     STD (sexually transmitted disease)     Varicose vein of leg        Past Surgical History:   Past Surgical History:   Procedure Laterality Date    ABDOMEN SURGERY  05/1991    testicular varicose vein repair    COLONOSCOPY  April 2021    MRI Virtual Colonoscopy    COLONOSCOPY N/A 04/01/2021    Procedure: COLONOSCOPY;  Surgeon: Gage Mckee MD;  Location:  GI    HERNIA REPAIR  2003    inguinal    LIGATE VEIN      '93 scrotum, '14 left leg    SOFT TISSUE SURGERY  01/2002    Lumbar spine issues, facet joint sundrome       Family History:  Noncontributory     Social History:    Social History     Tobacco Use    Smoking status: Never     Passive exposure: Never    Smokeless tobacco: Never   Vaping Use    Vaping status: Never Used   Substance Use Topics    Alcohol use: Not Currently     Comment: reduced to 3 drinks per week or less    Drug use: No     Comment: once daily        EXAMINATION:     Base Eye Exam       Visual Acuity (Snellen - Linear)         Right Left    Near cc 20/400 ph 20/200 20/25+2              Tonometry (Tonopen)         Right Left    Pressure 15 14              Pupils         Pupils    Right PERRL    Left PERRL              Visual Fields         Left Right     Full     Restrictions  Partial outer inferior temporal deficiency              Extraocular Movement         Right Left     Full, Ortho Full, Ortho              Neuro/Psych       Oriented x3: Yes    Mood/Affect: Normal              Dilation       Both eyes: 1.0% Mydriacyl, 2.5% Ra Synephrine                   Slit Lamp and Fundus Exam       External Exam         Right Left    External Normal Normal              Slit Lamp Exam          Right Left    Lids/Lashes Normal Normal    Conjunctiva/Sclera White and quiet White and quiet    Cornea Clear Clear    Anterior Chamber Deep and quiet Deep and quiet    Iris Round and reactive Round and reactive    Lens Clear Clear              Fundus Exam         Right Left    Posterior Vitreous [place]'s sign, Hemorrhage densest SN, tracking IN Ceja ring, Posterior vitreous detachment    Disc Normal, poor view, halo Normal    Macula Normal, attached where visualized Normal    Vessels Normal Normal    Periphery Horseshoe tear around 1:00-2:00 in far periphery with two patches of retinal heme adjacent, vitreous heme blocking the view to the posterior edge of the tear Normal                    Labs/Studies/Imaging Performed  As discussed in assessment and plan.      Alok Nowak MD  Resident Physician, PGY2  Department of Ophthalmology      ADDENDUM    Successfully lasered the tear in clinic at slit lamp     Pre-operative diagnosis:  Hemorrhagic PVD    Post-operative diagnosis:   same.  Procedure performed:   Laser retinopexy   Anesthesia:     Topical proparacaine 0.5% drops  Complications:   None.    Description of the procedure:    Informed consent was obtained from the patient.  The patient was brought to the laser room where argon laser was applied to the reitna. Parameters:    -Lens: 20 D   -Spot size: indirect laser   -Power:  500 mW  -Duration: 0.08  ms  -Total spots: 190    The patient tolerated the procedure well.  There were no complications.   The patient  left the clinic in stable condition.    Pain evaluation showed a pain rating of 0 out of 10 by the patient.    Danay Dorman MD  PGY-3  Department of Ophthalmology

## 2025-05-11 NOTE — TELEPHONE ENCOUNTER
Reason for Disposition    [1] Blurred vision or visual changes AND [2] present now AND [3] sudden onset or new (e.g., minutes, hours, days)  (Exception: Seeing floaters / black specks OR previously diagnosed migraine headaches with same symptoms.)    Additional Information    Negative: Weakness of the face, arm or leg on one side of the body    Negative: Followed getting substance in the eye    Negative: Foreign body stuck in the eye    Negative: Followed an eye injury    Negative: Followed sun lamp or sun exposure (UV keratitis)    Negative: Yellow or green discharge (pus) in the eye    Negative: Pregnant    Negative: Postpartum (from 0 to 6 weeks after delivery)    Negative: Complete loss of vision in one or both eyes    Negative: SEVERE eye pain    Negative: SEVERE headache    Negative: Double vision    Protocols used: Vision Loss or Change-A-AH

## 2025-05-11 NOTE — ED PROVIDER NOTES
"  Emergency Department Note      History of Present Illness     Chief Complaint   Eye Problem      HPI   Gabe Moon is a 65 year old male with a history of atrial fibrillation who presents with his wife to the Emergency Department for an eye problem. The patient reports he was watching TV when he developed black squiggles in the right side of his right eye that moved across his eye. He then developed blurry vision across the eye that is worse in the periphery that he describes as like a curtain to the right visual field of his right eye.  Of note, he has had more eye floaters a few days ago and reportedly saw the eye doctor at that time without concerning findings.  Denies pain or flashes in his vision.  Patient is nearsighted though now wears bifocals.  History of recent hip surgery.    Independent Historian   None    Review of External Notes   The patient does not have any records relevant to their current presentation.     Past Medical History     Medical History and Problem List   Atrial fibrillation with RVR  Inguinal hernia with gangrene  Thrombocytopenia    Medications   Aspirin 81 MG CAPS  Metoprolol succinate ER     Surgical History   Testicular varicose vein repair  Hernia repair, inguinal     Physical Exam     Patient Vitals for the past 24 hrs:   BP Temp Temp src Pulse Resp SpO2 Height Weight   05/10/25 2019 (!) 161/74 97.7  F (36.5  C) Temporal 61 16 100 % 1.905 m (6' 3\") 86.6 kg (191 lb)     Physical Exam  General: Patient in mild distress.  Alert and cooperative with exam. Normal mentation  HENT: NC/AT. Conjunctiva without injection or scleral icterus. External ears normal.  Respiratory: Breathing comfortably on room air  CV: Normal rate, all extremities well perfused  GI:  Non-distended abdomen  Skin: Warm, dry, no rashes/open wounds on exposed skin  Musculoskeletal: No obvious deformities  Neuro: Alert, answers questions appropriately. No gross motor deficits  Eye: Endorses right eye, right " visual field loss. PERRL, EOMI, informal bedside ocular ultrasound concerning for possible detachment.     Diagnostics     Lab Results   Labs Ordered and Resulted from Time of ED Arrival to Time of ED Departure - No data to display    Imaging   No orders to display       EKG   None    Independent Interpretation   None    ED Course      Medications Administered   Medications - No data to display    Procedures   None     Discussion of Management   None    ED Course   ED Course as of 05/10/25 2201   Sat May 10, 2025   2058 I evaluated the patient, obtained history, and performed a physical exam as detailed above.    2123 I consulted with ophthalmologist Dr. Macias about the patient's history, current condition, and plan of care. Their partner in the ED Dr. Kimball accepted the patient for transfer.       Additional Documentation  None    Medical Decision Making / Diagnosis     CMS Diagnoses: None    MIPS   None    [facility]   Gabe Moon is a 65 year old male presents with sudden onset right visual field disturbance in his right eye described as curtainlike vision loss.  Patient's history and medical records were reviewed.  On evaluation patient denies eye pain.  Left eye exam is normal.  Pupils are equal and reactive.  No signs of ocular infection.  There are no other neurologic deficits on history or exam.  Informal bedside ultrasound concerning for potential retinal defect.  History and exam concerning for retinal tear/detachment.  Discussed with [facility] ophthalmology as ophthalmology is not available at [facility].  Patient will be sent directly to the [facility] ED for ophthalmology evaluation.  Patient's wife will drive him.  He understands he is to be NPO.    Disposition   The patient was transferred to [facility] via private car. Dr. Kimball of the ED accepted the patient for transfer.     Diagnosis     ICD-10-CM    1. Vision loss of right eye  H54.61            Discharge Medications   Discharge Medication List  as of 5/10/2025  9:54 PM        Scribe Disclosure:  I, Elizabeth Mccain, am serving as a scribe at 8:42 PM on 5/10/2025 to document services personally performed by Jose Cullen DO based on my observations and the provider's statements to me.        Jose Cullen DO  05/10/25 8506

## 2025-05-11 NOTE — DISCHARGE INSTRUCTIONS
Please follow up in [facility] -[address] [place] [address], 9th floor with Dr. Dorman at 8 am tomorrow.     Please make an appointment to follow up with Eye Clinic (phone: [phone]) if any other problems/concerns.

## 2025-05-12 ENCOUNTER — TELEPHONE (OUTPATIENT)
Dept: OPHTHALMOLOGY | Facility: CLINIC | Age: 65
End: 2025-05-12
Payer: COMMERCIAL

## 2025-05-12 NOTE — TELEPHONE ENCOUNTER
Pt to follow up this week following ED visit/laser retinopexy    Scheduled tomorrow at 1 PM with Dr. Meneses    Pt aware of date/time/location/duration/hospital based clinic/main clinic number/parking ramp/    [employer] first health insurance (not [employer] medicare advantage insurance)    Jacob Botello RN 5:06 PM 05/12/25

## 2025-05-13 ENCOUNTER — OFFICE VISIT (OUTPATIENT)
Dept: OPHTHALMOLOGY | Facility: CLINIC | Age: 65
End: 2025-05-13
Attending: OPHTHALMOLOGY
Payer: COMMERCIAL

## 2025-05-13 DIAGNOSIS — H43.811 PVD (POSTERIOR VITREOUS DETACHMENT), RIGHT: ICD-10-CM

## 2025-05-13 DIAGNOSIS — H43.812 PVD (POSTERIOR VITREOUS DETACHMENT), LEFT EYE: ICD-10-CM

## 2025-05-13 DIAGNOSIS — H33.311 RETINAL TEAR OF RIGHT EYE: Primary | ICD-10-CM

## 2025-05-13 DIAGNOSIS — H43.11 VITREOUS HEMORRHAGE OF RIGHT EYE (H): ICD-10-CM

## 2025-05-13 DIAGNOSIS — H43.393 VITREOUS SYNERESIS OF BOTH EYES: Primary | ICD-10-CM

## 2025-05-13 DIAGNOSIS — H25.13 NUCLEAR SENILE CATARACT OF BOTH EYES: ICD-10-CM

## 2025-05-13 PROCEDURE — 92250 FUNDUS PHOTOGRAPHY W/I&R: CPT | Performed by: OPHTHALMOLOGY

## 2025-05-13 PROCEDURE — 99204 OFFICE O/P NEW MOD 45 MIN: CPT | Mod: 25 | Performed by: OPHTHALMOLOGY

## 2025-05-13 PROCEDURE — 99214 OFFICE O/P EST MOD 30 MIN: CPT | Performed by: OPHTHALMOLOGY

## 2025-05-13 PROCEDURE — 92134 CPTRZ OPH DX IMG PST SGM RTA: CPT | Performed by: OPHTHALMOLOGY

## 2025-05-13 PROCEDURE — 99207 FUNDUS PHOTOS OU (BOTH EYES): CPT | Mod: 26 | Performed by: OPHTHALMOLOGY

## 2025-05-13 PROCEDURE — 67145 PROPH RTA DTCHMNT PC: CPT | Mod: RT | Performed by: OPHTHALMOLOGY

## 2025-05-13 ASSESSMENT — REFRACTION_WEARINGRX
OS_AXIS: 063
OD_SPHERE: -4.00
OD_ADD: +2.50
OD_AXIS: 087
OS_SPHERE: -4.50
OS_ADD: +2.50
OD_CYLINDER: +1.00
OS_CYLINDER: +1.00
SPECS_TYPE: PAL

## 2025-05-13 ASSESSMENT — VISUAL ACUITY
METHOD: SNELLEN - LINEAR
OD_CC: 20/150
OS_CC: 20/25

## 2025-05-13 ASSESSMENT — TONOMETRY
IOP_METHOD: TONOPEN
OD_IOP_MMHG: 12
OS_IOP_MMHG: 12

## 2025-05-13 ASSESSMENT — SLIT LAMP EXAM - LIDS
COMMENTS: NORMAL
COMMENTS: NORMAL

## 2025-05-13 ASSESSMENT — CONF VISUAL FIELD
OD_INFERIOR_TEMPORAL_RESTRICTION: 3
OS_INFERIOR_TEMPORAL_RESTRICTION: 0
OS_INFERIOR_NASAL_RESTRICTION: 0
OS_SUPERIOR_NASAL_RESTRICTION: 0
OS_SUPERIOR_TEMPORAL_RESTRICTION: 0
METHOD: COUNTING FINGERS
OS_NORMAL: 1

## 2025-05-13 ASSESSMENT — EXTERNAL EXAM - LEFT EYE: OS_EXAM: NORMAL

## 2025-05-13 ASSESSMENT — EXTERNAL EXAM - RIGHT EYE: OD_EXAM: NORMAL

## 2025-05-13 NOTE — PROGRESS NOTES
"CC - hemorrhagic HST OD follow up    INTERVAL HISTORY - Initial visit. Pt here for follow up from laser treatment on Sunday with Dr Dorman Pt states the right eye is very blurry and \"Like looking through blood\" Floaters same as before No flashes or eye pain     HPI -   Gabe Moon is a 65 year old patient presenting for hemorrhagic HST OD (5/11/2025) follow up.    PAST OCULAR SURGERY  5/11/2025 Laser barricade for HST OD     RETINAL IMAGING:  OCT mac 05/13/25   OD - hazy view from VH limited glimpses attached  OS - hyaloid off; laminations/fovea preserved    ASSESSMENT & PLAN    # Hemorrhagic PVD OD  - Onset 5/11/2025 presented to ED with 1:00 HST treated with laser barricade; no FH of RD; remote trauma to right eye   - 05/13/25 VA improved to 20/150; VH clearing but still moderate; retina appears attached; room for fill with barricade laser now that heme has cleared -> add laser today  - RTC 1-2 weeks VTD optos right eye only     # Cataract OU  - NVS; monitor    Naina Arevalo MD  Vitreoretinal Surgery Fellow     Complete documentation of historical and exam elements from today's encounter can be found in the full encounter summary report (not reduplicated in this progress note). I personally obtained the chief complaint(s) and history of present illness.  I confirmed and edited as necessary the review of systems, past medical/surgical history, family history, social history, and examination findings as documented by others; and I examined the patient myself. I personally reviewed the relevant tests, images, and reports as documented above. I formulated and edited as necessary the assessment and plan and discussed the findings and management plan with the patient and family.     Ajay Meneses MD, PhD  "

## 2025-05-13 NOTE — NURSING NOTE
"Chief Complaints and History of Present Illnesses   Patient presents with    Retinal Evaluation     RT right eye , S/p laser right eye (Moon)5/11/25     Chief Complaint(s) and History of Present Illness(es)       Retinal Evaluation              Comments: RT right eye , S/p laser right eye (Dorman)5/11/25              Comments    Pt here for follow up from laser treatment on Sunday with Dr Dorman  Pt states the right eye is very blurry and \"Like looking through blood\"  Floaters same as before   No flashes or eye pain     Anni Fenton COT 12:57 PM May 13, 2025                        "

## 2025-05-14 ENCOUNTER — TELEPHONE (OUTPATIENT)
Dept: OPHTHALMOLOGY | Facility: CLINIC | Age: 65
End: 2025-05-14
Payer: COMMERCIAL

## 2025-05-15 ENCOUNTER — OFFICE VISIT (OUTPATIENT)
Dept: OPHTHALMOLOGY | Facility: CLINIC | Age: 65
End: 2025-05-15
Attending: OPHTHALMOLOGY
Payer: COMMERCIAL

## 2025-05-15 ENCOUNTER — TELEPHONE (OUTPATIENT)
Dept: OPHTHALMOLOGY | Facility: CLINIC | Age: 65
End: 2025-05-15
Payer: COMMERCIAL

## 2025-05-15 DIAGNOSIS — H33.311 RETINAL TEAR OF RIGHT EYE: Primary | ICD-10-CM

## 2025-05-15 PROCEDURE — 99213 OFFICE O/P EST LOW 20 MIN: CPT

## 2025-05-15 PROCEDURE — 67105 REPAIR DETACHED RETINA PC: CPT | Mod: RT

## 2025-05-15 ASSESSMENT — TONOMETRY
IOP_METHOD: TONOPEN
OS_IOP_MMHG: 15
OD_IOP_MMHG: 13

## 2025-05-15 ASSESSMENT — EXTERNAL EXAM - LEFT EYE: OS_EXAM: NORMAL

## 2025-05-15 ASSESSMENT — VISUAL ACUITY
OS_CC: 20/25
OD_PH_CC+: +2
OD_CC: 20/200
OS_CC+: -1
CORRECTION_TYPE: GLASSES
METHOD: SNELLEN - LINEAR
OD_PH_CC: 20/60

## 2025-05-15 ASSESSMENT — EXTERNAL EXAM - RIGHT EYE: OD_EXAM: NORMAL

## 2025-05-15 ASSESSMENT — SLIT LAMP EXAM - LIDS
COMMENTS: NORMAL
COMMENTS: NORMAL

## 2025-05-15 NOTE — NURSING NOTE
"Chief Complaints and History of Present Illnesses   Patient presents with    new flashes of light     Chief Complaint(s) and History of Present Illness(es)       new flashes of light               Comments    S/p 5/11/2025 Laser barricade for HST OD     New flashes od laterally - llikens to \"perkins or fireflies\"  Has \"usually fogginess from blood-like floater\" od.   Denies new floaters, other curtain, shade, shadow, web, pain.     Dryness/itching os.     Ocular meds: denies any drops or eye meds.     Jess JACK, May 15, 2025 9:28 AM                         "

## 2025-05-15 NOTE — PROGRESS NOTES
CC - hemorrhagic HST OD follow up    INTERVAL HISTORY - having some flashes of light in the right eye after laser on Tuesday. No other changes.     HPI -   Gabe Moon is a 65 year old patient presenting for hemorrhagic HST OD (5/11/2025) follow up.    PAST OCULAR SURGERY  5/11/2025 Laser barricade for HST OD     RETINAL IMAGING:  OCT mac 05/13/25   OD - hazy view from VH limited glimpses attached  OS - hyaloid off; laminations/fovea preserved    ASSESSMENT & PLAN    # Hemorrhagic PVD OD  - Onset 5/11/2025 presented to ED with 1:00 HST treated with laser barricade; no FH of RD; remote trauma to right eye   - 05/13/25 VA improved to 20/150. Laser fill in was added  - 05/15/25 VA improved to 20/60. Has had some new flashing lights. Examination appeared stable but with some possible focal SRF. Laser fill in was added.   - RTC as scheduled 5/27    # Cataract OU  - NVS; monitor    Patient seen and discussed with Dr. More and Dr. Nicko Nowak MD  Resident Physician, PGY-2  Department of Ophthalmology  05/15/2025 12:47 PM     complains of pain/discomfort

## 2025-05-15 NOTE — TELEPHONE ENCOUNTER
Spoke to patient on phone 5/14/25. He states that starting around 7pm tonight, he sees sparkles and flashes of light in his vision, noticed especially if his eyes are closed. He was just seen this weekend for HST and VH, which underwent laser retinopexy with the retina team on 5/13/25.     Offered to see patient tonight in ED versus acute clinic tomorrow. Patient opted for acute clinic tomorrow, message sent to schedulers. Did give precautions including decrease in vision or large increase in floaters, for which patient should come in more urgently to ED overnight. Patient voiced understanding.     Ruy Tran MD  PGY-3 Ophthalmology Resident  [facility]

## 2025-05-27 ENCOUNTER — OFFICE VISIT (OUTPATIENT)
Dept: OPHTHALMOLOGY | Facility: CLINIC | Age: 65
End: 2025-05-27
Attending: OPHTHALMOLOGY
Payer: COMMERCIAL

## 2025-05-27 DIAGNOSIS — H43.11 VITREOUS HEMORRHAGE OF RIGHT EYE (H): ICD-10-CM

## 2025-05-27 DIAGNOSIS — H43.811 PVD (POSTERIOR VITREOUS DETACHMENT), RIGHT: ICD-10-CM

## 2025-05-27 DIAGNOSIS — H25.13 NUCLEAR SENILE CATARACT OF BOTH EYES: ICD-10-CM

## 2025-05-27 DIAGNOSIS — H33.311 RETINAL TEAR OF RIGHT EYE: Primary | ICD-10-CM

## 2025-05-27 PROCEDURE — 92134 CPTRZ OPH DX IMG PST SGM RTA: CPT | Performed by: OPHTHALMOLOGY

## 2025-05-27 PROCEDURE — 92250 FUNDUS PHOTOGRAPHY W/I&R: CPT | Performed by: OPHTHALMOLOGY

## 2025-05-27 PROCEDURE — 99212 OFFICE O/P EST SF 10 MIN: CPT | Performed by: OPHTHALMOLOGY

## 2025-05-27 ASSESSMENT — REFRACTION_WEARINGRX
OD_SPHERE: -4.00
OD_CYLINDER: +1.00
SPECS_TYPE: PAL
OS_SPHERE: -4.50
OS_AXIS: 063
OS_CYLINDER: +1.00
OS_ADD: +2.50
OD_ADD: +2.50
OD_AXIS: 087

## 2025-05-27 ASSESSMENT — EXTERNAL EXAM - LEFT EYE: OS_EXAM: NORMAL

## 2025-05-27 ASSESSMENT — VISUAL ACUITY
CORRECTION_TYPE: GLASSES
OS_CC: 20/25
OD_CC: 20/40
OD_CC+: +2
OS_CC+: -2
METHOD: SNELLEN - LINEAR

## 2025-05-27 ASSESSMENT — CONF VISUAL FIELD
OD_SUPERIOR_NASAL_RESTRICTION: 0
OD_INFERIOR_TEMPORAL_RESTRICTION: 0
OS_INFERIOR_TEMPORAL_RESTRICTION: 0
OD_SUPERIOR_TEMPORAL_RESTRICTION: 0
METHOD: COUNTING FINGERS
OS_SUPERIOR_NASAL_RESTRICTION: 0
OS_SUPERIOR_TEMPORAL_RESTRICTION: 0
OS_NORMAL: 1
OD_NORMAL: 1
OS_INFERIOR_NASAL_RESTRICTION: 0
OD_INFERIOR_NASAL_RESTRICTION: 0

## 2025-05-27 ASSESSMENT — SLIT LAMP EXAM - LIDS
COMMENTS: NORMAL
COMMENTS: NORMAL

## 2025-05-27 ASSESSMENT — TONOMETRY
OS_IOP_MMHG: 15
OD_IOP_MMHG: 13
IOP_METHOD: TONOPEN

## 2025-05-27 ASSESSMENT — EXTERNAL EXAM - RIGHT EYE: OD_EXAM: NORMAL

## 2025-05-27 NOTE — PROGRESS NOTES
CC - hemorrhagic HST OD follow up    INTERVAL HISTORY - vision improving; no new flashes.  No other changes.     HPI -   Gabe Moon is a 65 year old patient presenting for hemorrhagic HST OD (5/11/2025) follow up.    PAST OCULAR SURGERY  5/11/2025 Laser barricade for HST OD     RETINAL IMAGING:  OCT mac 05/13/25 and 5/27/25  OD - hazy view from VH limited glimpses attached  OS - hyaloid off; laminations/fovea preserved    ASSESSMENT & PLAN    # Hemorrhagic PVD OD  - Onset 5/11/2025 presented to ED with 1:00 HST treated with laser barricade; no FH of RD; remote trauma to right eye   - 05/13/25 VA improved to 20/150. Laser fill in was added  - 05/15/25 VA improved to 20/60. Has had some new flashing lights. Examination appeared stable but with some possible focal SRF. Laser fill in was added.   - 5/27/25; VA at 20/40; VH dehemoglobinized but still significant vit opacities.   - RTC 4 w for right eye DFE and optos right eye     # Cataract OU  - NVS; monitor    Complete documentation of historical and exam elements from today's encounter can be found in the full encounter summary report (not reduplicated in this progress note). I personally obtained the chief complaint(s) and history of present illness.  I confirmed and edited as necessary the review of systems, past medical/surgical history, family history, social history, and examination findings as documented by others; and I examined the patient myself. I personally reviewed the relevant tests, images, and reports as documented above. I formulated and edited as necessary the assessment and plan and discussed the findings and management plan with the patient and family.    Ajay Meneses MD, PhD

## 2025-05-27 NOTE — NURSING NOTE
Chief Complaints and History of Present Illnesses   Patient presents with    Follow Up     Chief Complaint(s) and History of Present Illness(es)       Follow Up              Associated symptoms: floaters (Still looks like drops of blood floating in liquid).  Negative for eye pain and flashes    Treatments tried: no treatments              Comments    Pt reports his vision is only slightly more clear. Pt notes he was using a compress for a bump on his SHERI/lashline but stopped when he get the tear. Should he start any other treatment?    Tsering PATRICIO 12:57 PM May 27, 2025

## 2025-06-28 ENCOUNTER — HEALTH MAINTENANCE LETTER (OUTPATIENT)
Age: 65
End: 2025-06-28

## 2025-07-01 DIAGNOSIS — H33.311 RETINAL TEAR OF RIGHT EYE: Primary | ICD-10-CM

## 2025-07-08 ENCOUNTER — OFFICE VISIT (OUTPATIENT)
Dept: OPHTHALMOLOGY | Facility: CLINIC | Age: 65
End: 2025-07-08
Attending: OPHTHALMOLOGY
Payer: COMMERCIAL

## 2025-07-08 DIAGNOSIS — H25.13 NUCLEAR SENILE CATARACT OF BOTH EYES: ICD-10-CM

## 2025-07-08 DIAGNOSIS — H43.11 VITREOUS HEMORRHAGE OF RIGHT EYE (H): Primary | ICD-10-CM

## 2025-07-08 DIAGNOSIS — H33.311 RETINAL TEAR OF RIGHT EYE: ICD-10-CM

## 2025-07-08 DIAGNOSIS — H43.811 PVD (POSTERIOR VITREOUS DETACHMENT), RIGHT: ICD-10-CM

## 2025-07-08 PROCEDURE — 99211 OFF/OP EST MAY X REQ PHY/QHP: CPT | Performed by: OPHTHALMOLOGY

## 2025-07-08 PROCEDURE — 92014 COMPRE OPH EXAM EST PT 1/>: CPT | Performed by: OPHTHALMOLOGY

## 2025-07-08 PROCEDURE — 92250 FUNDUS PHOTOGRAPHY W/I&R: CPT | Performed by: OPHTHALMOLOGY

## 2025-07-08 ASSESSMENT — TONOMETRY
OD_IOP_MMHG: 16
IOP_METHOD: TONOPEN
OS_IOP_MMHG: 16

## 2025-07-08 ASSESSMENT — REFRACTION_WEARINGRX
OD_SPHERE: -4.00
OD_AXIS: 087
OS_SPHERE: -4.50
OS_AXIS: 063
OS_ADD: +2.50
OD_ADD: +2.50
OD_CYLINDER: +1.00
OS_CYLINDER: +1.00
SPECS_TYPE: PAL

## 2025-07-08 ASSESSMENT — SLIT LAMP EXAM - LIDS
COMMENTS: NORMAL
COMMENTS: NORMAL

## 2025-07-08 ASSESSMENT — VISUAL ACUITY
OS_CC: 20/25-2
CORRECTION_TYPE: GLASSES
OD_CC: 20/40+2
OD_PH_CC: 20/20-3
METHOD: SNELLEN - LINEAR

## 2025-07-08 ASSESSMENT — EXTERNAL EXAM - RIGHT EYE: OD_EXAM: NORMAL

## 2025-07-08 ASSESSMENT — EXTERNAL EXAM - LEFT EYE: OS_EXAM: NORMAL

## 2025-07-08 NOTE — PROGRESS NOTES
CC - hemorrhagic HST OD follow up    INTERVAL HISTORY - vision improving; no new flashes.  Continues to have bothersome floaters; No other changes.     HPI -   Gabe Moon is a 65 year old patient presenting for hemorrhagic HST OD (5/11/2025) follow up.    PAST OCULAR SURGERY  5/11/2025 Laser barricade for HST OD     RETINAL IMAGING:  OCT mac 05/27/25  OD - hazy view from VH limited glimpses attached  OS - hyaloid off; laminations/fovea preserved    ASSESSMENT & PLAN    # Hemorrhagic PVD OD  - Onset 5/11/2025 presented to ED with 1:00 HST treated with laser barricade; no FH of RD; remote trauma to right eye   - 05/13/25 VA improved to 20/150. Laser fill in was added  - 05/15/25 VA improved to 20/60. Has had some new flashing lights. Examination appeared stable but with some possible focal SRF. Laser fill in was added.   - 5/27/25; VA at 20/40; VH dehemoglobinized but still significant vit opacities.   - 7/8/25: VA at 20/20 (ph); VH less dense and dehemoglobinize but bothersome floaters; recommended to wait  - RTC 4 M for right eye DFE and OCT and optos right eye     # Cataract OU  - NVS; monitor    Complete documentation of historical and exam elements from today's encounter can be found in the full encounter summary report (not reduplicated in this progress note). I personally obtained the chief complaint(s) and history of present illness.  I confirmed and edited as necessary the review of systems, past medical/surgical history, family history, social history, and examination findings as documented by others; and I examined the patient myself. I personally reviewed the relevant tests, images, and reports as documented above. I formulated and edited as necessary the assessment and plan and discussed the findings and management plan with the patient and family.    Ajay Meneses MD, PhD

## 2025-07-31 ENCOUNTER — OFFICE VISIT (OUTPATIENT)
Dept: INTERNAL MEDICINE | Facility: CLINIC | Age: 65
End: 2025-07-31
Payer: COMMERCIAL

## 2025-07-31 VITALS
HEIGHT: 75 IN | SYSTOLIC BLOOD PRESSURE: 128 MMHG | OXYGEN SATURATION: 100 % | DIASTOLIC BLOOD PRESSURE: 84 MMHG | HEART RATE: 72 BPM | WEIGHT: 187.6 LBS | BODY MASS INDEX: 23.32 KG/M2 | RESPIRATION RATE: 15 BRPM | TEMPERATURE: 97.7 F

## 2025-07-31 DIAGNOSIS — R09.A2 GLOBUS SENSATION: ICD-10-CM

## 2025-07-31 DIAGNOSIS — R49.0 HOARSENESS: Primary | ICD-10-CM

## 2025-07-31 RX ORDER — OMEPRAZOLE 40 MG/1
40 CAPSULE, DELAYED RELEASE ORAL DAILY
Qty: 90 CAPSULE | Refills: 0 | Status: SHIPPED | OUTPATIENT
Start: 2025-07-31

## 2025-07-31 RX ORDER — FLUTICASONE PROPIONATE 50 MCG
2 SPRAY, SUSPENSION (ML) NASAL DAILY
Qty: 16 G | Refills: 3 | Status: SHIPPED | OUTPATIENT
Start: 2025-07-31

## 2025-07-31 NOTE — PROGRESS NOTES
"  ASSESSMENT/PLAN                                                      (R49.0) Hoarseness  (primary encounter diagnosis)  (R09.A2) Globus sensation  Plan: START Flonase and omeprazole, both daily and consistently for post-nasal drip and acid reflux, respectively; referred to ENT for direct laryngoscopy in case of vocal cord injury during recent surgery.    Jenn Chu MD   [facility]  [address] [address]  T: [phone], F: [phone]    SUBJECTIVE                                                      Gabe Moon is a very pleasant 65 year old male who presents with hoarseness:    Noticed it late May after he had surgery (intubated during surgery). May have been present prior to that, but does not recall. Voice volume normal. Feels like there is something, like mucus, in the back of his throat. Occasional post nasal drip. Occasional cough (mostly to try to clear the back of his throat).    No sneezing, runny nose, or itchy, watery, or red eyes.   No throat pain, shortness of breath or difficulty with swallowing/managing secretions.   No acid reflux symptoms.     Never smoker.     OBJECTIVE                                                      /84   Pulse 72   Temp 97.7  F (36.5  C)   Resp 15   Ht 1.905 m (6' 3\")   Wt 85.1 kg (187 lb 9.6 oz)   SpO2 100%   BMI 23.45 kg/m    Constitutional: well-appearing, voice is hoarse  Head, Ears, Nose, and Mouth: normocephalic, atraumatic; normal external auditory canal and pinna; tympanic membranes visualized and normal; nasal septum straight; no oropharyngeal lesions or ulcers  Neck: supple, symmetric, no thyromegaly or lymphadenopathy    ---    (Note documentation was completed, in part, with [employer] voice-recognition software. Documentation was reviewed, but some grammatical, spelling, and word errors may remain.)    "

## 2025-08-31 ENCOUNTER — NURSE TRIAGE (OUTPATIENT)
Dept: NURSING | Facility: CLINIC | Age: 65
End: 2025-08-31
Payer: COMMERCIAL

## (undated) RX ORDER — FENTANYL CITRATE 50 UG/ML
INJECTION, SOLUTION INTRAMUSCULAR; INTRAVENOUS
Status: DISPENSED
Start: 2021-04-01